# Patient Record
Sex: MALE | Race: BLACK OR AFRICAN AMERICAN | NOT HISPANIC OR LATINO | Employment: OTHER | URBAN - METROPOLITAN AREA
[De-identification: names, ages, dates, MRNs, and addresses within clinical notes are randomized per-mention and may not be internally consistent; named-entity substitution may affect disease eponyms.]

---

## 2018-03-21 ENCOUNTER — TELEPHONE (OUTPATIENT)
Dept: PAIN MEDICINE | Facility: CLINIC | Age: 58
End: 2018-03-21

## 2018-03-21 NOTE — TELEPHONE ENCOUNTER
----- Message from Twyla Deutsch sent at 3/20/2018  9:06 AM CDT -----  Contact: Cassia (wife)/ [phone]  Patients wife would like patient to be seen today for an MRI follow up that they brought to your office about a year ago.     Please call and advise.

## 2018-03-21 NOTE — TELEPHONE ENCOUNTER
Spoke with patient's wife regarding scheduling appt. Patient's wife stated that the patient need an appt due to his back pain. Patient's wife verbalized and confirmed appt date and time of arrival on 3/27/18 at 3 PM.

## 2018-03-21 NOTE — TELEPHONE ENCOUNTER
----- Message from Lorie Hyman sent at 3/21/2018 10:13 AM CDT -----  Contact: Wife.(Cassia) [phone]  Patient is returning your call. Patient's wife would like to speak with you about scheduling the patient's appointment for 3/23/18 if possible 2nd option would be 3/27/18. Please advise.

## 2018-03-21 NOTE — TELEPHONE ENCOUNTER
Spoke with patient's wife regarding scheduling an appt. Patient's wife stated that she will need to call back to schedule.

## 2020-06-16 PROBLEM — Z79.4 LONG TERM CURRENT USE OF INSULIN: Status: ACTIVE | Noted: 2018-02-20

## 2020-06-16 PROBLEM — M47.816 ARTHROPATHY OF LUMBAR FACET JOINT: Status: ACTIVE | Noted: 2018-08-21

## 2020-06-16 PROBLEM — G47.00 INSOMNIA: Status: ACTIVE | Noted: 2017-05-11

## 2020-06-16 PROBLEM — E66.01 MORBID OBESITY: Status: ACTIVE | Noted: 2019-02-26

## 2020-06-16 PROBLEM — Z72.0 TOBACCO USE: Status: ACTIVE | Noted: 2017-08-11

## 2020-06-16 PROBLEM — Z89.432 HISTORY OF AMPUTATION OF LEFT FOOT: Status: ACTIVE | Noted: 2020-06-16

## 2020-06-16 PROBLEM — R25.2 LEG CRAMPS: Status: ACTIVE | Noted: 2018-05-21

## 2020-06-16 PROBLEM — E11.319 DIABETIC RETINOPATHY ASSOCIATED WITH TYPE 2 DIABETES MELLITUS: Status: ACTIVE | Noted: 2018-08-26

## 2020-06-16 PROBLEM — E78.00 HIGH CHOLESTEROL: Status: ACTIVE | Noted: 2017-02-01

## 2020-06-16 PROBLEM — E11.51 TYPE 2 DIABETES MELLITUS WITH PERIPHERAL ANGIOPATHY: Status: ACTIVE | Noted: 2018-05-21

## 2020-06-16 PROBLEM — Z89.412 HISTORY OF AMPUTATION OF LEFT GREAT TOE: Status: ACTIVE | Noted: 2020-06-16

## 2020-06-16 PROBLEM — E11.65 HYPERGLYCEMIA DUE TO TYPE 2 DIABETES MELLITUS: Status: ACTIVE | Noted: 2018-02-20

## 2020-06-16 PROBLEM — U07.1 COVID-19: Status: ACTIVE | Noted: 2020-06-16

## 2020-09-23 PROBLEM — I48.92 ATRIAL FIBRILLATION AND FLUTTER: Status: ACTIVE | Noted: 2020-09-23

## 2020-09-23 PROBLEM — M46.96 INFLAMMATORY SPONDYLOPATHY OF LUMBAR REGION: Status: ACTIVE | Noted: 2020-09-23

## 2020-09-23 PROBLEM — E11.51 TYPE 2 DIABETES MELLITUS WITH PERIPHERAL ANGIOPATHY: Chronic | Status: ACTIVE | Noted: 2018-05-21

## 2020-09-23 PROBLEM — E11.40 DIABETIC NEUROPATHY: Status: ACTIVE | Noted: 2020-09-23

## 2020-09-23 PROBLEM — E66.01 MORBID OBESITY: Status: RESOLVED | Noted: 2019-02-26 | Resolved: 2020-09-23

## 2020-09-23 PROBLEM — F32.A DEPRESSIVE DISORDER: Status: ACTIVE | Noted: 2020-09-23

## 2020-09-23 PROBLEM — I73.9 PERIPHERAL VASCULAR DISEASE: Status: ACTIVE | Noted: 2020-09-23

## 2020-09-23 PROBLEM — F11.20 UNCOMPLICATED OPIOID DEPENDENCE: Status: ACTIVE | Noted: 2020-09-23

## 2020-09-23 PROBLEM — I48.91 ATRIAL FIBRILLATION AND FLUTTER: Status: ACTIVE | Noted: 2020-09-23

## 2020-09-23 PROBLEM — Z79.4 LONG TERM CURRENT USE OF INSULIN: Chronic | Status: ACTIVE | Noted: 2018-02-20

## 2020-09-23 PROBLEM — K21.9 GASTROESOPHAGEAL REFLUX DISEASE: Status: ACTIVE | Noted: 2020-09-23

## 2020-12-17 ENCOUNTER — LAB VISIT (OUTPATIENT)
Dept: LAB | Facility: HOSPITAL | Age: 60
End: 2020-12-17
Attending: FAMILY MEDICINE
Payer: MEDICARE

## 2020-12-17 DIAGNOSIS — Z79.4 TYPE 2 DIABETES MELLITUS WITH HYPOGLYCEMIA WITHOUT COMA, WITH LONG-TERM CURRENT USE OF INSULIN: ICD-10-CM

## 2020-12-17 DIAGNOSIS — E11.649 TYPE 2 DIABETES MELLITUS WITH HYPOGLYCEMIA WITHOUT COMA, WITH LONG-TERM CURRENT USE OF INSULIN: ICD-10-CM

## 2020-12-17 LAB
ANION GAP SERPL CALC-SCNC: 9 MMOL/L (ref 8–16)
BUN SERPL-MCNC: 20 MG/DL (ref 6–20)
CALCIUM SERPL-MCNC: 9.5 MG/DL (ref 8.7–10.5)
CHLORIDE SERPL-SCNC: 106 MMOL/L (ref 95–110)
CO2 SERPL-SCNC: 24 MMOL/L (ref 23–29)
CREAT SERPL-MCNC: 1.4 MG/DL (ref 0.5–1.4)
EST. GFR  (AFRICAN AMERICAN): >60 ML/MIN/1.73 M^2
EST. GFR  (NON AFRICAN AMERICAN): 54 ML/MIN/1.73 M^2
ESTIMATED AVG GLUCOSE: 177 MG/DL (ref 68–131)
GLUCOSE SERPL-MCNC: 168 MG/DL (ref 70–110)
HBA1C MFR BLD HPLC: 7.8 % (ref 4–5.6)
POTASSIUM SERPL-SCNC: 4.9 MMOL/L (ref 3.5–5.1)
SODIUM SERPL-SCNC: 139 MMOL/L (ref 136–145)

## 2020-12-17 PROCEDURE — 83036 HEMOGLOBIN GLYCOSYLATED A1C: CPT

## 2020-12-17 PROCEDURE — 80048 BASIC METABOLIC PNL TOTAL CA: CPT

## 2020-12-17 PROCEDURE — 36415 COLL VENOUS BLD VENIPUNCTURE: CPT

## 2020-12-23 PROBLEM — F32.A DEPRESSIVE DISORDER: Status: RESOLVED | Noted: 2020-09-23 | Resolved: 2020-12-23

## 2021-02-26 ENCOUNTER — IMMUNIZATION (OUTPATIENT)
Dept: INTERNAL MEDICINE | Facility: CLINIC | Age: 61
End: 2021-02-26
Payer: MEDICARE

## 2021-02-26 DIAGNOSIS — Z23 NEED FOR VACCINATION: Primary | ICD-10-CM

## 2021-02-26 PROCEDURE — 91300 COVID-19, MRNA, LNP-S, PF, 30 MCG/0.3 ML DOSE VACCINE: CPT | Mod: PBBFAC | Performed by: INTERNAL MEDICINE

## 2021-03-19 ENCOUNTER — IMMUNIZATION (OUTPATIENT)
Dept: INTERNAL MEDICINE | Facility: CLINIC | Age: 61
End: 2021-03-19
Payer: MEDICARE

## 2021-03-19 DIAGNOSIS — Z23 NEED FOR VACCINATION: Primary | ICD-10-CM

## 2021-03-19 PROCEDURE — 91300 COVID-19, MRNA, LNP-S, PF, 30 MCG/0.3 ML DOSE VACCINE: CPT | Mod: PBBFAC | Performed by: INTERNAL MEDICINE

## 2021-03-19 PROCEDURE — 0002A COVID-19, MRNA, LNP-S, PF, 30 MCG/0.3 ML DOSE VACCINE: CPT | Mod: PBBFAC | Performed by: INTERNAL MEDICINE

## 2021-03-22 ENCOUNTER — LAB VISIT (OUTPATIENT)
Dept: LAB | Facility: HOSPITAL | Age: 61
End: 2021-03-22
Attending: FAMILY MEDICINE
Payer: MEDICARE

## 2021-03-22 DIAGNOSIS — E11.51 TYPE 2 DIABETES MELLITUS WITH PERIPHERAL ANGIOPATHY: Chronic | ICD-10-CM

## 2021-03-22 LAB
ALBUMIN SERPL BCP-MCNC: 3.3 G/DL (ref 3.5–5.2)
ALP SERPL-CCNC: 58 U/L (ref 55–135)
ALT SERPL W/O P-5'-P-CCNC: 11 U/L (ref 10–44)
ANION GAP SERPL CALC-SCNC: 9 MMOL/L (ref 8–16)
AST SERPL-CCNC: 16 U/L (ref 10–40)
BILIRUB SERPL-MCNC: 0.4 MG/DL (ref 0.1–1)
BUN SERPL-MCNC: 24 MG/DL (ref 6–20)
CALCIUM SERPL-MCNC: 9.3 MG/DL (ref 8.7–10.5)
CHLORIDE SERPL-SCNC: 109 MMOL/L (ref 95–110)
CHOLEST SERPL-MCNC: 164 MG/DL (ref 120–199)
CHOLEST/HDLC SERPL: 3.7 {RATIO} (ref 2–5)
CO2 SERPL-SCNC: 22 MMOL/L (ref 23–29)
CREAT SERPL-MCNC: 1.5 MG/DL (ref 0.5–1.4)
EST. GFR  (AFRICAN AMERICAN): 58 ML/MIN/1.73 M^2
EST. GFR  (NON AFRICAN AMERICAN): 50 ML/MIN/1.73 M^2
ESTIMATED AVG GLUCOSE: 177 MG/DL (ref 68–131)
GLUCOSE SERPL-MCNC: 138 MG/DL (ref 70–110)
HBA1C MFR BLD: 7.8 % (ref 4–5.6)
HDLC SERPL-MCNC: 44 MG/DL (ref 40–75)
HDLC SERPL: 26.8 % (ref 20–50)
LDLC SERPL CALC-MCNC: 107 MG/DL (ref 63–159)
NONHDLC SERPL-MCNC: 120 MG/DL
POTASSIUM SERPL-SCNC: 5.1 MMOL/L (ref 3.5–5.1)
PROT SERPL-MCNC: 7.7 G/DL (ref 6–8.4)
SODIUM SERPL-SCNC: 140 MMOL/L (ref 136–145)
TRIGL SERPL-MCNC: 65 MG/DL (ref 30–150)

## 2021-03-22 PROCEDURE — 83036 HEMOGLOBIN GLYCOSYLATED A1C: CPT | Performed by: FAMILY MEDICINE

## 2021-03-22 PROCEDURE — 80053 COMPREHEN METABOLIC PANEL: CPT | Performed by: FAMILY MEDICINE

## 2021-03-22 PROCEDURE — 80061 LIPID PANEL: CPT | Performed by: FAMILY MEDICINE

## 2021-03-22 PROCEDURE — 36415 COLL VENOUS BLD VENIPUNCTURE: CPT | Performed by: FAMILY MEDICINE

## 2021-06-25 ENCOUNTER — HOSPITAL ENCOUNTER (EMERGENCY)
Facility: HOSPITAL | Age: 61
Discharge: HOME | End: 2021-06-25
Attending: EMERGENCY MEDICINE
Payer: MEDICARE

## 2021-06-25 VITALS
SYSTOLIC BLOOD PRESSURE: 140 MMHG | TEMPERATURE: 98 F | HEIGHT: 75 IN | RESPIRATION RATE: 19 BRPM | OXYGEN SATURATION: 100 % | HEART RATE: 76 BPM | DIASTOLIC BLOOD PRESSURE: 65 MMHG | BODY MASS INDEX: 39.17 KG/M2 | WEIGHT: 315 LBS

## 2021-06-25 DIAGNOSIS — R42 DIZZINESS: ICD-10-CM

## 2021-06-25 LAB
ALBUMIN SERPL BCP-MCNC: 3.3 G/DL (ref 3.5–5.2)
ALP SERPL-CCNC: 50 U/L (ref 55–135)
ALT SERPL W/O P-5'-P-CCNC: 9 U/L (ref 10–44)
ANION GAP SERPL CALC-SCNC: 9 MMOL/L (ref 8–16)
AST SERPL-CCNC: 14 U/L (ref 10–40)
BASOPHILS # BLD AUTO: 0.05 K/UL (ref 0–0.2)
BASOPHILS NFR BLD: 0.7 % (ref 0–1.9)
BILIRUB SERPL-MCNC: 0.3 MG/DL (ref 0.1–1)
BILIRUB UR QL STRIP: NEGATIVE
BNP SERPL-MCNC: 37 PG/ML (ref 0–99)
BUN SERPL-MCNC: 27 MG/DL (ref 6–20)
CALCIUM SERPL-MCNC: 8.9 MG/DL (ref 8.7–10.5)
CHLORIDE SERPL-SCNC: 107 MMOL/L (ref 95–110)
CK SERPL-CCNC: 246 U/L (ref 20–200)
CLARITY UR: CLEAR
CO2 SERPL-SCNC: 23 MMOL/L (ref 23–29)
COLOR UR: YELLOW
CREAT SERPL-MCNC: 1.6 MG/DL (ref 0.5–1.4)
DIFFERENTIAL METHOD: ABNORMAL
EOSINOPHIL # BLD AUTO: 0.2 K/UL (ref 0–0.5)
EOSINOPHIL NFR BLD: 3.2 % (ref 0–8)
ERYTHROCYTE [DISTWIDTH] IN BLOOD BY AUTOMATED COUNT: 14.9 % (ref 11.5–14.5)
EST. GFR  (AFRICAN AMERICAN): 53 ML/MIN/1.73 M^2
EST. GFR  (NON AFRICAN AMERICAN): 46 ML/MIN/1.73 M^2
GLUCOSE SERPL-MCNC: 131 MG/DL (ref 70–110)
GLUCOSE UR QL STRIP: NEGATIVE
HCT VFR BLD AUTO: 39 % (ref 40–54)
HGB BLD-MCNC: 12.5 G/DL (ref 14–18)
HGB UR QL STRIP: NEGATIVE
IMM GRANULOCYTES # BLD AUTO: 0.04 K/UL (ref 0–0.04)
IMM GRANULOCYTES NFR BLD AUTO: 0.6 % (ref 0–0.5)
KETONES UR QL STRIP: NEGATIVE
LEUKOCYTE ESTERASE UR QL STRIP: NEGATIVE
LYMPHOCYTES # BLD AUTO: 2.5 K/UL (ref 1–4.8)
LYMPHOCYTES NFR BLD: 34.5 % (ref 18–48)
MCH RBC QN AUTO: 28.5 PG (ref 27–31)
MCHC RBC AUTO-ENTMCNC: 32.1 G/DL (ref 32–36)
MCV RBC AUTO: 89 FL (ref 82–98)
MONOCYTES # BLD AUTO: 0.7 K/UL (ref 0.3–1)
MONOCYTES NFR BLD: 9.7 % (ref 4–15)
NEUTROPHILS # BLD AUTO: 3.6 K/UL (ref 1.8–7.7)
NEUTROPHILS NFR BLD: 51.3 % (ref 38–73)
NITRITE UR QL STRIP: NEGATIVE
NRBC BLD-RTO: 0 /100 WBC
PH UR STRIP: 5 [PH] (ref 5–8)
PLATELET # BLD AUTO: 173 K/UL (ref 150–450)
PMV BLD AUTO: 11.3 FL (ref 9.2–12.9)
POCT GLUCOSE: 123 MG/DL (ref 70–110)
POTASSIUM SERPL-SCNC: 4.4 MMOL/L (ref 3.5–5.1)
PROT SERPL-MCNC: 6.8 G/DL (ref 6–8.4)
PROT UR QL STRIP: NEGATIVE
RBC # BLD AUTO: 4.39 M/UL (ref 4.6–6.2)
SODIUM SERPL-SCNC: 139 MMOL/L (ref 136–145)
SP GR UR STRIP: 1.02 (ref 1–1.03)
TROPONIN I SERPL DL<=0.01 NG/ML-MCNC: 0.01 NG/ML (ref 0–0.03)
URN SPEC COLLECT METH UR: NORMAL
UROBILINOGEN UR STRIP-ACNC: NEGATIVE EU/DL
WBC # BLD AUTO: 7.1 K/UL (ref 3.9–12.7)

## 2021-06-25 PROCEDURE — 99285 EMERGENCY DEPT VISIT HI MDM: CPT | Mod: 25

## 2021-06-25 PROCEDURE — 82962 GLUCOSE BLOOD TEST: CPT

## 2021-06-25 PROCEDURE — 93005 ELECTROCARDIOGRAM TRACING: CPT

## 2021-06-25 PROCEDURE — 93010 ELECTROCARDIOGRAM REPORT: CPT | Mod: ,,, | Performed by: INTERNAL MEDICINE

## 2021-06-25 PROCEDURE — 85025 COMPLETE CBC W/AUTO DIFF WBC: CPT | Performed by: EMERGENCY MEDICINE

## 2021-06-25 PROCEDURE — 80053 COMPREHEN METABOLIC PANEL: CPT | Performed by: EMERGENCY MEDICINE

## 2021-06-25 PROCEDURE — 96365 THER/PROPH/DIAG IV INF INIT: CPT

## 2021-06-25 PROCEDURE — 84484 ASSAY OF TROPONIN QUANT: CPT | Performed by: EMERGENCY MEDICINE

## 2021-06-25 PROCEDURE — 83880 ASSAY OF NATRIURETIC PEPTIDE: CPT | Performed by: EMERGENCY MEDICINE

## 2021-06-25 PROCEDURE — 93010 EKG 12-LEAD: ICD-10-PCS | Mod: ,,, | Performed by: INTERNAL MEDICINE

## 2021-06-25 PROCEDURE — 25000003 PHARM REV CODE 250: Performed by: EMERGENCY MEDICINE

## 2021-06-25 PROCEDURE — 63600175 PHARM REV CODE 636 W HCPCS: Performed by: EMERGENCY MEDICINE

## 2021-06-25 PROCEDURE — 82550 ASSAY OF CK (CPK): CPT | Performed by: EMERGENCY MEDICINE

## 2021-06-25 PROCEDURE — 81003 URINALYSIS AUTO W/O SCOPE: CPT | Performed by: EMERGENCY MEDICINE

## 2021-06-25 RX ORDER — MECLIZINE HYDROCHLORIDE 25 MG/1
25 TABLET ORAL 3 TIMES DAILY PRN
Qty: 20 TABLET | Refills: 0 | Status: SHIPPED | OUTPATIENT
Start: 2021-06-25 | End: 2021-08-25

## 2021-06-25 RX ADMIN — PROMETHAZINE HYDROCHLORIDE 12.5 MG: 25 INJECTION INTRAMUSCULAR; INTRAVENOUS at 12:06

## 2021-07-07 PROBLEM — Z89.432 HISTORY OF AMPUTATION OF LEFT FOOT: Chronic | Status: ACTIVE | Noted: 2020-06-16

## 2021-07-07 PROBLEM — E11.65 HYPERGLYCEMIA DUE TO TYPE 2 DIABETES MELLITUS: Status: RESOLVED | Noted: 2018-02-20 | Resolved: 2021-07-07

## 2021-07-07 PROBLEM — E11.3513 PROLIFERATIVE DIABETIC RETINOPATHY OF BOTH EYES WITH MACULAR EDEMA ASSOCIATED WITH TYPE 2 DIABETES MELLITUS: Status: ACTIVE | Noted: 2021-07-07

## 2021-08-25 PROBLEM — U07.1 COVID-19: Status: RESOLVED | Noted: 2020-06-16 | Resolved: 2021-08-25

## 2021-08-26 ENCOUNTER — TELEPHONE (OUTPATIENT)
Dept: CARDIOLOGY | Facility: CLINIC | Age: 61
End: 2021-08-26

## 2022-02-01 ENCOUNTER — LAB VISIT (OUTPATIENT)
Dept: LAB | Facility: HOSPITAL | Age: 62
End: 2022-02-01
Attending: FAMILY MEDICINE
Payer: MEDICARE

## 2022-02-01 DIAGNOSIS — I10 ESSENTIAL HYPERTENSION: ICD-10-CM

## 2022-02-01 DIAGNOSIS — N18.31 STAGE 3A CHRONIC KIDNEY DISEASE: ICD-10-CM

## 2022-02-01 DIAGNOSIS — E11.51 TYPE 2 DIABETES MELLITUS WITH PERIPHERAL ANGIOPATHY: ICD-10-CM

## 2022-02-01 PROBLEM — N18.30 STAGE 3 CHRONIC KIDNEY DISEASE: Status: ACTIVE | Noted: 2022-02-01

## 2022-02-01 PROBLEM — E66.01 MORBID OBESITY: Chronic | Status: ACTIVE | Noted: 2019-02-26

## 2022-02-01 PROBLEM — N18.30 STAGE 3 CHRONIC KIDNEY DISEASE: Chronic | Status: ACTIVE | Noted: 2022-02-01

## 2022-02-01 LAB
ALBUMIN SERPL BCP-MCNC: 3.3 G/DL (ref 3.5–5.2)
ALP SERPL-CCNC: 53 U/L (ref 55–135)
ALT SERPL W/O P-5'-P-CCNC: 12 U/L (ref 10–44)
ANION GAP SERPL CALC-SCNC: 10 MMOL/L (ref 8–16)
ANION GAP SERPL CALC-SCNC: 10 MMOL/L (ref 8–16)
AST SERPL-CCNC: 17 U/L (ref 10–40)
BASOPHILS # BLD AUTO: 0.07 K/UL (ref 0–0.2)
BASOPHILS NFR BLD: 1 % (ref 0–1.9)
BILIRUB SERPL-MCNC: 0.3 MG/DL (ref 0.1–1)
BUN SERPL-MCNC: 21 MG/DL (ref 8–23)
BUN SERPL-MCNC: 21 MG/DL (ref 8–23)
CALCIUM SERPL-MCNC: 9.3 MG/DL (ref 8.7–10.5)
CALCIUM SERPL-MCNC: 9.3 MG/DL (ref 8.7–10.5)
CHLORIDE SERPL-SCNC: 110 MMOL/L (ref 95–110)
CHLORIDE SERPL-SCNC: 110 MMOL/L (ref 95–110)
CHOLEST SERPL-MCNC: 156 MG/DL (ref 120–199)
CHOLEST/HDLC SERPL: 3.5 {RATIO} (ref 2–5)
CO2 SERPL-SCNC: 22 MMOL/L (ref 23–29)
CO2 SERPL-SCNC: 22 MMOL/L (ref 23–29)
CREAT SERPL-MCNC: 1.5 MG/DL (ref 0.5–1.4)
CREAT SERPL-MCNC: 1.5 MG/DL (ref 0.5–1.4)
DIFFERENTIAL METHOD: ABNORMAL
EOSINOPHIL # BLD AUTO: 0.2 K/UL (ref 0–0.5)
EOSINOPHIL NFR BLD: 3 % (ref 0–8)
ERYTHROCYTE [DISTWIDTH] IN BLOOD BY AUTOMATED COUNT: 14.8 % (ref 11.5–14.5)
EST. GFR  (AFRICAN AMERICAN): 57 ML/MIN/1.73 M^2
EST. GFR  (AFRICAN AMERICAN): 57 ML/MIN/1.73 M^2
EST. GFR  (NON AFRICAN AMERICAN): 50 ML/MIN/1.73 M^2
EST. GFR  (NON AFRICAN AMERICAN): 50 ML/MIN/1.73 M^2
ESTIMATED AVG GLUCOSE: 180 MG/DL (ref 68–131)
GLUCOSE SERPL-MCNC: 85 MG/DL (ref 70–110)
GLUCOSE SERPL-MCNC: 85 MG/DL (ref 70–110)
HBA1C MFR BLD: 7.9 % (ref 4–5.6)
HCT VFR BLD AUTO: 38.3 % (ref 40–54)
HDLC SERPL-MCNC: 44 MG/DL (ref 40–75)
HDLC SERPL: 28.2 % (ref 20–50)
HGB BLD-MCNC: 12.2 G/DL (ref 14–18)
IMM GRANULOCYTES # BLD AUTO: 0.05 K/UL (ref 0–0.04)
IMM GRANULOCYTES NFR BLD AUTO: 0.7 % (ref 0–0.5)
LDLC SERPL CALC-MCNC: 95.2 MG/DL (ref 63–159)
LYMPHOCYTES # BLD AUTO: 3.2 K/UL (ref 1–4.8)
LYMPHOCYTES NFR BLD: 43.9 % (ref 18–48)
MCH RBC QN AUTO: 28.5 PG (ref 27–31)
MCHC RBC AUTO-ENTMCNC: 31.9 G/DL (ref 32–36)
MCV RBC AUTO: 90 FL (ref 82–98)
MONOCYTES # BLD AUTO: 0.8 K/UL (ref 0.3–1)
MONOCYTES NFR BLD: 10.5 % (ref 4–15)
NEUTROPHILS # BLD AUTO: 3 K/UL (ref 1.8–7.7)
NEUTROPHILS NFR BLD: 40.9 % (ref 38–73)
NONHDLC SERPL-MCNC: 112 MG/DL
NRBC BLD-RTO: 0 /100 WBC
PLATELET # BLD AUTO: 207 K/UL (ref 150–450)
PMV BLD AUTO: 10.7 FL (ref 9.2–12.9)
POTASSIUM SERPL-SCNC: 4.8 MMOL/L (ref 3.5–5.1)
POTASSIUM SERPL-SCNC: 4.8 MMOL/L (ref 3.5–5.1)
PROT SERPL-MCNC: 7.7 G/DL (ref 6–8.4)
PTH-INTACT SERPL-MCNC: 61.8 PG/ML (ref 9–77)
RBC # BLD AUTO: 4.28 M/UL (ref 4.6–6.2)
SODIUM SERPL-SCNC: 142 MMOL/L (ref 136–145)
SODIUM SERPL-SCNC: 142 MMOL/L (ref 136–145)
TRIGL SERPL-MCNC: 84 MG/DL (ref 30–150)
TSH SERPL DL<=0.005 MIU/L-ACNC: 1.88 UIU/ML (ref 0.4–4)
WBC # BLD AUTO: 7.32 K/UL (ref 3.9–12.7)

## 2022-02-01 PROCEDURE — 36415 COLL VENOUS BLD VENIPUNCTURE: CPT | Performed by: FAMILY MEDICINE

## 2022-02-01 PROCEDURE — 83036 HEMOGLOBIN GLYCOSYLATED A1C: CPT | Performed by: FAMILY MEDICINE

## 2022-02-01 PROCEDURE — 85025 COMPLETE CBC W/AUTO DIFF WBC: CPT | Performed by: FAMILY MEDICINE

## 2022-02-01 PROCEDURE — 84443 ASSAY THYROID STIM HORMONE: CPT | Performed by: FAMILY MEDICINE

## 2022-02-01 PROCEDURE — 83970 ASSAY OF PARATHORMONE: CPT | Performed by: FAMILY MEDICINE

## 2022-02-01 PROCEDURE — 80061 LIPID PANEL: CPT | Performed by: FAMILY MEDICINE

## 2022-02-01 PROCEDURE — 80053 COMPREHEN METABOLIC PANEL: CPT | Performed by: FAMILY MEDICINE

## 2022-03-07 PROBLEM — Z79.4 LONG TERM CURRENT USE OF INSULIN: Chronic | Status: RESOLVED | Noted: 2018-02-20 | Resolved: 2022-03-07

## 2022-04-26 PROBLEM — S91.302A WOUND OF LEFT FOOT: Status: ACTIVE | Noted: 2022-04-26

## 2022-05-09 ENCOUNTER — LAB VISIT (OUTPATIENT)
Dept: LAB | Facility: HOSPITAL | Age: 62
End: 2022-05-09
Attending: FAMILY MEDICINE
Payer: MEDICARE

## 2022-05-09 DIAGNOSIS — E11.51 TYPE 2 DIABETES MELLITUS WITH PERIPHERAL ANGIOPATHY: Chronic | ICD-10-CM

## 2022-05-09 LAB
ANION GAP SERPL CALC-SCNC: 13 MMOL/L (ref 8–16)
BUN SERPL-MCNC: 35 MG/DL (ref 8–23)
CALCIUM SERPL-MCNC: 9.7 MG/DL (ref 8.7–10.5)
CHLORIDE SERPL-SCNC: 107 MMOL/L (ref 95–110)
CO2 SERPL-SCNC: 21 MMOL/L (ref 23–29)
CREAT SERPL-MCNC: 1.9 MG/DL (ref 0.5–1.4)
EST. GFR  (AFRICAN AMERICAN): 43 ML/MIN/1.73 M^2
EST. GFR  (NON AFRICAN AMERICAN): 37 ML/MIN/1.73 M^2
ESTIMATED AVG GLUCOSE: 232 MG/DL (ref 68–131)
GLUCOSE SERPL-MCNC: 260 MG/DL (ref 70–110)
HBA1C MFR BLD: 9.7 % (ref 4–5.6)
POTASSIUM SERPL-SCNC: 4.4 MMOL/L (ref 3.5–5.1)
SODIUM SERPL-SCNC: 141 MMOL/L (ref 136–145)

## 2022-05-09 PROCEDURE — 36415 COLL VENOUS BLD VENIPUNCTURE: CPT | Performed by: FAMILY MEDICINE

## 2022-05-09 PROCEDURE — 83036 HEMOGLOBIN GLYCOSYLATED A1C: CPT | Performed by: FAMILY MEDICINE

## 2022-05-09 PROCEDURE — 80048 BASIC METABOLIC PNL TOTAL CA: CPT | Performed by: FAMILY MEDICINE

## 2022-05-19 ENCOUNTER — HOSPITAL ENCOUNTER (OUTPATIENT)
Dept: RADIOLOGY | Facility: HOSPITAL | Age: 62
Discharge: HOME | End: 2022-05-19
Attending: SURGERY
Payer: MEDICARE

## 2022-05-19 DIAGNOSIS — E11.40 TYPE 2 DIABETES MELLITUS WITH DIABETIC NEUROPATHY, WITH LONG-TERM CURRENT USE OF INSULIN: Chronic | ICD-10-CM

## 2022-05-19 DIAGNOSIS — I10 ESSENTIAL HYPERTENSION: ICD-10-CM

## 2022-05-19 DIAGNOSIS — Z79.4 TYPE 2 DIABETES MELLITUS WITH DIABETIC NEUROPATHY, WITH LONG-TERM CURRENT USE OF INSULIN: Chronic | ICD-10-CM

## 2022-05-19 DIAGNOSIS — F11.20 UNCOMPLICATED OPIOID DEPENDENCE: ICD-10-CM

## 2022-05-19 DIAGNOSIS — T81.49XA WOUND, SURGICAL, INFECTED: ICD-10-CM

## 2022-05-19 DIAGNOSIS — Z79.4 TYPE 2 DIABETES MELLITUS WITH DIABETIC NEUROPATHY, WITH LONG-TERM CURRENT USE OF INSULIN: ICD-10-CM

## 2022-05-19 DIAGNOSIS — E11.40 TYPE 2 DIABETES MELLITUS WITH DIABETIC NEUROPATHY, WITH LONG-TERM CURRENT USE OF INSULIN: ICD-10-CM

## 2022-05-19 DIAGNOSIS — E66.01 MORBID OBESITY: ICD-10-CM

## 2022-05-19 PROCEDURE — 73630 XR FOOT COMPLETE 3 VIEW LEFT: ICD-10-PCS | Mod: 26,LT,, | Performed by: RADIOLOGY

## 2022-05-19 PROCEDURE — 73630 X-RAY EXAM OF FOOT: CPT | Mod: 26,LT,, | Performed by: RADIOLOGY

## 2022-05-19 PROCEDURE — 73630 X-RAY EXAM OF FOOT: CPT | Mod: TC,FY,LT

## 2022-05-23 ENCOUNTER — ANESTHESIA EVENT (OUTPATIENT)
Dept: SURGERY | Facility: HOSPITAL | Age: 62
End: 2022-05-23
Payer: MEDICARE

## 2022-05-23 ENCOUNTER — ANESTHESIA (OUTPATIENT)
Dept: SURGERY | Facility: HOSPITAL | Age: 62
End: 2022-05-23
Payer: MEDICARE

## 2022-05-23 ENCOUNTER — HOSPITAL ENCOUNTER (OUTPATIENT)
Facility: HOSPITAL | Age: 62
Discharge: HOME | End: 2022-05-23
Attending: SURGERY | Admitting: SURGERY
Payer: MEDICARE

## 2022-05-23 VITALS
HEIGHT: 76 IN | OXYGEN SATURATION: 95 % | DIASTOLIC BLOOD PRESSURE: 60 MMHG | TEMPERATURE: 97 F | RESPIRATION RATE: 16 BRPM | BODY MASS INDEX: 34.95 KG/M2 | SYSTOLIC BLOOD PRESSURE: 103 MMHG | WEIGHT: 287 LBS | HEART RATE: 70 BPM

## 2022-05-23 DIAGNOSIS — S91.302A WOUND OF LEFT FOOT: Primary | ICD-10-CM

## 2022-05-23 DIAGNOSIS — Z79.4 TYPE 2 DIABETES MELLITUS WITH DIABETIC NEUROPATHY, WITH LONG-TERM CURRENT USE OF INSULIN: Chronic | ICD-10-CM

## 2022-05-23 DIAGNOSIS — F11.20 UNCOMPLICATED OPIOID DEPENDENCE: ICD-10-CM

## 2022-05-23 DIAGNOSIS — N18.31 STAGE 3A CHRONIC KIDNEY DISEASE: Chronic | ICD-10-CM

## 2022-05-23 DIAGNOSIS — I10 ESSENTIAL HYPERTENSION: Chronic | ICD-10-CM

## 2022-05-23 DIAGNOSIS — E66.01 MORBID OBESITY: Chronic | ICD-10-CM

## 2022-05-23 DIAGNOSIS — E11.40 TYPE 2 DIABETES MELLITUS WITH DIABETIC NEUROPATHY, WITH LONG-TERM CURRENT USE OF INSULIN: Chronic | ICD-10-CM

## 2022-05-23 DIAGNOSIS — I73.9 PERIPHERAL VASCULAR DISEASE: ICD-10-CM

## 2022-05-23 DIAGNOSIS — E11.51 TYPE 2 DIABETES MELLITUS WITH PERIPHERAL ANGIOPATHY: Chronic | ICD-10-CM

## 2022-05-23 DIAGNOSIS — E11.44 DIABETIC AMYOTROPHY ASSOCIATED WITH TYPE 2 DIABETES MELLITUS: ICD-10-CM

## 2022-05-23 LAB — POCT GLUCOSE: 229 MG/DL (ref 70–110)

## 2022-05-23 PROCEDURE — 71000016 HC POSTOP RECOV ADDL HR: Performed by: SURGERY

## 2022-05-23 PROCEDURE — 87205 SMEAR GRAM STAIN: CPT | Performed by: SURGERY

## 2022-05-23 PROCEDURE — 87077 CULTURE AEROBIC IDENTIFY: CPT | Performed by: SURGERY

## 2022-05-23 PROCEDURE — 25000003 PHARM REV CODE 250: Performed by: SURGERY

## 2022-05-23 PROCEDURE — 25000003 PHARM REV CODE 250: Performed by: NURSE ANESTHETIST, CERTIFIED REGISTERED

## 2022-05-23 PROCEDURE — 87186 SC STD MICRODIL/AGAR DIL: CPT | Mod: 59 | Performed by: SURGERY

## 2022-05-23 PROCEDURE — 63600175 PHARM REV CODE 636 W HCPCS: Performed by: NURSE ANESTHETIST, CERTIFIED REGISTERED

## 2022-05-23 PROCEDURE — 36000707: Performed by: SURGERY

## 2022-05-23 PROCEDURE — 87076 CULTURE ANAEROBE IDENT EACH: CPT | Mod: 59 | Performed by: SURGERY

## 2022-05-23 PROCEDURE — 37000008 HC ANESTHESIA 1ST 15 MINUTES: Performed by: SURGERY

## 2022-05-23 PROCEDURE — 71000015 HC POSTOP RECOV 1ST HR: Performed by: SURGERY

## 2022-05-23 PROCEDURE — 87075 CULTR BACTERIA EXCEPT BLOOD: CPT | Performed by: SURGERY

## 2022-05-23 PROCEDURE — 37000009 HC ANESTHESIA EA ADD 15 MINS: Performed by: SURGERY

## 2022-05-23 PROCEDURE — 87070 CULTURE OTHR SPECIMN AEROBIC: CPT | Performed by: SURGERY

## 2022-05-23 PROCEDURE — 36000706: Performed by: SURGERY

## 2022-05-23 RX ORDER — MUPIROCIN 20 MG/G
OINTMENT TOPICAL
Status: DISCONTINUED | OUTPATIENT
Start: 2022-05-23 | End: 2022-05-23 | Stop reason: HOSPADM

## 2022-05-23 RX ORDER — ACETAMINOPHEN 325 MG/1
650 TABLET ORAL EVERY 4 HOURS PRN
Status: DISCONTINUED | OUTPATIENT
Start: 2022-05-23 | End: 2022-05-23 | Stop reason: HOSPADM

## 2022-05-23 RX ORDER — PROPOFOL 10 MG/ML
VIAL (ML) INTRAVENOUS CONTINUOUS PRN
Status: DISCONTINUED | OUTPATIENT
Start: 2022-05-23 | End: 2022-05-23

## 2022-05-23 RX ORDER — ONDANSETRON 2 MG/ML
INJECTION INTRAMUSCULAR; INTRAVENOUS
Status: DISCONTINUED | OUTPATIENT
Start: 2022-05-23 | End: 2022-05-23

## 2022-05-23 RX ORDER — DEXMEDETOMIDINE HYDROCHLORIDE 100 UG/ML
INJECTION, SOLUTION INTRAVENOUS
Status: DISCONTINUED | OUTPATIENT
Start: 2022-05-23 | End: 2022-05-23

## 2022-05-23 RX ORDER — PROPOFOL 10 MG/ML
VIAL (ML) INTRAVENOUS
Status: DISCONTINUED | OUTPATIENT
Start: 2022-05-23 | End: 2022-05-23

## 2022-05-23 RX ORDER — LIDOCAINE HYDROCHLORIDE 20 MG/ML
INJECTION, SOLUTION EPIDURAL; INFILTRATION; INTRACAUDAL; PERINEURAL
Status: DISCONTINUED | OUTPATIENT
Start: 2022-05-23 | End: 2022-05-23

## 2022-05-23 RX ORDER — CLINDAMYCIN PHOSPHATE 900 MG/50ML
INJECTION, SOLUTION INTRAVENOUS
Status: DISCONTINUED | OUTPATIENT
Start: 2022-05-23 | End: 2022-05-23

## 2022-05-23 RX ORDER — SODIUM CHLORIDE 9 MG/ML
INJECTION, SOLUTION INTRAVENOUS CONTINUOUS
Status: DISCONTINUED | OUTPATIENT
Start: 2022-05-23 | End: 2022-05-23 | Stop reason: HOSPADM

## 2022-05-23 RX ORDER — FENTANYL CITRATE 50 UG/ML
INJECTION, SOLUTION INTRAMUSCULAR; INTRAVENOUS
Status: DISCONTINUED | OUTPATIENT
Start: 2022-05-23 | End: 2022-05-23

## 2022-05-23 RX ORDER — HYDROCODONE BITARTRATE AND ACETAMINOPHEN 5; 325 MG/1; MG/1
1 TABLET ORAL EVERY 6 HOURS PRN
Qty: 20 TABLET | Refills: 0 | Status: ON HOLD | OUTPATIENT
Start: 2022-05-23 | End: 2023-07-17 | Stop reason: HOSPADM

## 2022-05-23 RX ORDER — HYDROCODONE BITARTRATE AND ACETAMINOPHEN 5; 325 MG/1; MG/1
1 TABLET ORAL EVERY 4 HOURS PRN
Status: DISCONTINUED | OUTPATIENT
Start: 2022-05-23 | End: 2022-05-23 | Stop reason: HOSPADM

## 2022-05-23 RX ORDER — MIDAZOLAM HYDROCHLORIDE 1 MG/ML
INJECTION, SOLUTION INTRAMUSCULAR; INTRAVENOUS
Status: DISCONTINUED | OUTPATIENT
Start: 2022-05-23 | End: 2022-05-23

## 2022-05-23 RX ORDER — PHENYLEPHRINE HYDROCHLORIDE 10 MG/ML
INJECTION INTRAVENOUS
Status: DISCONTINUED | OUTPATIENT
Start: 2022-05-23 | End: 2022-05-23

## 2022-05-23 RX ORDER — LIDOCAINE HYDROCHLORIDE 10 MG/ML
INJECTION, SOLUTION EPIDURAL; INFILTRATION; INTRACAUDAL; PERINEURAL
Status: DISCONTINUED | OUTPATIENT
Start: 2022-05-23 | End: 2022-05-23 | Stop reason: HOSPADM

## 2022-05-23 RX ADMIN — PROPOFOL 30 MG: 10 INJECTION, EMULSION INTRAVENOUS at 12:05

## 2022-05-23 RX ADMIN — PHENYLEPHRINE HYDROCHLORIDE 100 MCG: 10 INJECTION INTRAVENOUS at 01:05

## 2022-05-23 RX ADMIN — CLINDAMYCIN IN 5 PERCENT DEXTROSE 600 MG: 18 INJECTION, SOLUTION INTRAVENOUS at 01:05

## 2022-05-23 RX ADMIN — DEXMEDETOMIDINE HYDROCHLORIDE 8 MCG: 100 INJECTION, SOLUTION, CONCENTRATE INTRAVENOUS at 12:05

## 2022-05-23 RX ADMIN — PROPOFOL 50 MCG/KG/MIN: 10 INJECTION, EMULSION INTRAVENOUS at 12:05

## 2022-05-23 RX ADMIN — PHENYLEPHRINE HYDROCHLORIDE 100 MCG: 10 INJECTION INTRAVENOUS at 12:05

## 2022-05-23 RX ADMIN — FENTANYL CITRATE 25 MCG: 50 INJECTION, SOLUTION INTRAMUSCULAR; INTRAVENOUS at 01:05

## 2022-05-23 RX ADMIN — SODIUM CHLORIDE 70 ML/HR: 0.9 INJECTION, SOLUTION INTRAVENOUS at 10:05

## 2022-05-23 RX ADMIN — FENTANYL CITRATE 25 MCG: 50 INJECTION, SOLUTION INTRAMUSCULAR; INTRAVENOUS at 12:05

## 2022-05-23 RX ADMIN — MIDAZOLAM 2 MG: 1 INJECTION INTRAMUSCULAR; INTRAVENOUS at 12:05

## 2022-05-23 RX ADMIN — LIDOCAINE HYDROCHLORIDE 100 MG: 20 INJECTION, SOLUTION EPIDURAL; INFILTRATION; INTRACAUDAL; PERINEURAL at 12:05

## 2022-05-23 RX ADMIN — DEXMEDETOMIDINE HYDROCHLORIDE 4 MCG: 100 INJECTION, SOLUTION, CONCENTRATE INTRAVENOUS at 12:05

## 2022-05-23 RX ADMIN — SODIUM CHLORIDE: 0.9 INJECTION, SOLUTION INTRAVENOUS at 12:05

## 2022-05-23 RX ADMIN — ONDANSETRON 8 MG: 2 INJECTION, SOLUTION INTRAMUSCULAR; INTRAVENOUS at 01:05

## 2022-05-23 RX ADMIN — GLYCOPYRROLATE 0.2 MG: 0.2 INJECTION, SOLUTION INTRAMUSCULAR; INTRAVITREAL at 12:05

## 2022-05-23 RX ADMIN — PHENYLEPHRINE HYDROCHLORIDE 200 MCG: 10 INJECTION INTRAVENOUS at 01:05

## 2022-05-23 NOTE — OP NOTE
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]  Uncomplicated opioid dependence [F11.20]  Essential hypertension [I10]  Morbid obesity [E66.01]  Type 2 diabetes mellitus with diabetic neuropathy, with long-term current use of insulin [E11.40, Z79.4]    Post-Operative Diagnosis: Post-Op Diagnosis Codes:     * 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    Description of Technical Procedures:  After satisfactory IV sedation patient in supine position time-out was called site was confirmed patient identity confirmed left leg left foot was prepped and draped in a sterile manner using ChloraPrep stockinette was applied area of the infected wound at transmetatarsal stump was infiltrated using 1% xylocaine solution with help of knife and Metzenbaum all infected necrotic tissue was debrided up to freshly bleeding tissue wound was cauterized using electric cautery was thoroughly irrigated antibiotic solution hemostasis was maintained tissue was also sent for culture wound was then dressed using Xeroform 4 x 4 and Kerlix patient  tolerated well size of the wound was 12 x 6 cm patient sent to recovery room in stable condition.    Significant Surgical Tasks Conducted by the Assistant(s), if Applicable: na    Estimated Blood Loss (EBL): 30 cc         Implants: * No implants in log *    Specimens:   Specimen (24h ago, onward)            None                  Condition: Good    Disposition: PACU - hemodynamically stable.    Attestation: I performed the procedure.    Discharge Note    OUTCOME: Patient tolerated treatment/procedure well without complication and is now ready for discharge.    DISPOSITION: Home or Self Care    FINAL DIAGNOSIS:  Wound of left foot    FOLLOWUP: In clinic Thursday.    DISCHARGE INSTRUCTIONS:    Discharge Procedure Orders   Diet general     Keep surgical extremity elevated     Lifting restrictions     Leave dressing on - Keep it clean, dry, and intact until clinic visit     Call MD for:  temperature >100.4     Call MD for:  persistent nausea and vomiting     Call MD for:  severe uncontrolled pain     Call MD for:  redness, tenderness, or signs of infection (pain, swelling, redness, odor or green/yellow discharge around incision site)

## 2022-05-23 NOTE — ANESTHESIA POSTPROCEDURE EVALUATION
Anesthesia Post Evaluation    Patient: Al Anna    Procedure(s) Performed: Procedure(s) (LRB):  DEBRIDEMENT, WOUND (Left)    Final Anesthesia Type: MAC      Patient location during evaluation: [facility]  Patient participation: Yes- Able to Participate  Level of consciousness: awake and alert  Post-procedure vital signs: reviewed and stable  Pain management: adequate  Airway patency: patent    PONV status at discharge: No PONV  Anesthetic complications: no      Cardiovascular status: blood pressure returned to baseline  Respiratory status: unassisted and spontaneous ventilation  Hydration status: euvolemic  Follow-up not needed.          Vitals Value Taken Time   /65 05/23/22 1316   Temp 98 05/23/22 1316   Pulse 78 05/23/22 1316   Resp 16 05/23/22 1316   SpO2 100 05/23/22 1316         No case tracking events are documented in the log.      Pain/Darby Score: No data recorded

## 2022-05-23 NOTE — ANESTHESIA PREPROCEDURE EVALUATION
05/23/2022  Al Anna is a 61 y.o., male.  Past Medical History:   Diagnosis Date    Coronary artery disease     MI (myocardial infarction)    COVID-19 6/16/2020    Diabetes mellitus     Neuropathy - Retinopathy - PAD    Glaucoma     High cholesterol     Hypertension      Pre-op Assessment    I have reviewed the Patient Summary Reports.    I have reviewed the NPO Status.   I have reviewed the Medications.     Review of Systems  Anesthesia Hx:  Denies Family Hx of Anesthesia complications.    Social:  Former Smoker, Alcohol Use    Cardiovascular:   Hypertension CAD      Renal/:   Chronic Renal Disease    Hepatic/GI:   GERD    Neurological:   Neuromuscular Disease,    Endocrine:   Diabetes        Physical Exam  General: Well nourished    Airway:  Mallampati: II   TM Distance: Normal  Neck ROM: Normal ROM    Chest/Lungs:  Clear to auscultation    Heart:  Rate: Normal  Rhythm: Regular Rhythm      Lab Results   Component Value Date    WBC 7.32 02/01/2022    HGB 12.2 (L) 02/01/2022    HCT 38.3 (L) 02/01/2022     02/01/2022    CHOL 156 02/01/2022    TRIG 84 02/01/2022    HDL 44 02/01/2022    ALT 12 02/01/2022    AST 17 02/01/2022     05/09/2022    K 4.4 05/09/2022     05/09/2022    CREATININE 1.9 (H) 05/09/2022    BUN 35 (H) 05/09/2022    CO2 21 (L) 05/09/2022    TSH 1.879 02/01/2022    INR 0.9 08/19/2013    HGBA1C 9.7 (H) 05/09/2022         Anesthesia Plan  Type of Anesthesia, risks & benefits discussed:    Anesthesia Type: MAC, Gen ETT  Intra-op Monitoring Plan: Standard ASA Monitors  Informed Consent: Informed consent signed with the Patient and all parties understand the risks and agree with anesthesia plan.  All questions answered.   ASA Score: 3    Ready For Surgery From Anesthesia Perspective.     .

## 2022-05-23 NOTE — H&P
"History & Physical    SUBJECTIVE:     History of Present Illness:  Patient is a 61 y.o. male presents with      No chief complaint on file.      Review of patient's allergies indicates:   Allergen Reactions    Penicillins Rash       Current Facility-Administered Medications   Medication Dose Route Frequency Provider Last Rate Last Admin    0.9%  NaCl infusion   Intravenous Continuous Dilip Horner MD 70 mL/hr at 05/23/22 1045 70 mL/hr at 05/23/22 1045    mupirocin 2 % ointment   Nasal On Call Procedure Dilip Horner MD           Past Medical History:   Diagnosis Date    Coronary artery disease     MI (myocardial infarction)    COVID-19 6/16/2020    Diabetes mellitus     Neuropathy - Retinopathy - PAD    Glaucoma     High cholesterol     Hypertension      History reviewed. No pertinent surgical history.  Family History   Problem Relation Age of Onset    Heart disease Father      Social History     Tobacco Use    Smoking status: Former Smoker     Types: Cigarettes     Start date: 10/28/1979    Smokeless tobacco: Never Used   Substance Use Topics    Alcohol use: Yes     Alcohol/week: 1.0 standard drink     Types: 1 Cans of beer per week    Drug use: No        Review of Systems:    Review of Systems   Constitutional: Negative.    HENT: Negative.    Eyes: Negative.    Respiratory: Negative.    Cardiovascular: Negative.    Gastrointestinal: Negative.    Genitourinary: Negative.    Musculoskeletal: Negative.    Neurological: Negative.    Psychiatric/Behavioral: Negative.        OBJECTIVE:     Vital Signs (Most Recent)  Temp: 97.9 °F (36.6 °C) (05/23/22 1102)  Pulse: 93 (05/23/22 1102)  Resp: 18 (05/23/22 1102)  BP: 130/67 (05/23/22 1109)  SpO2: 98 % (05/23/22 1102)  6' 3.5" (1.918 m)  130.2 kg (287 lb)     Physical Exam:    Physical Exam  Constitutional:       Appearance: He is well-developed.   HENT:      Head: Normocephalic.   Eyes:      Conjunctiva/sclera: Conjunctivae normal.      Pupils: " Pupils are equal, round, and reactive to light.   Cardiovascular:      Rate and Rhythm: Normal rate and regular rhythm.      Heart sounds: Normal heart sounds.   Pulmonary:      Effort: Pulmonary effort is normal.      Breath sounds: Normal breath sounds.   Abdominal:      General: Bowel sounds are normal.      Palpations: Abdomen is soft.   Musculoskeletal:         General: Normal range of motion.      Cervical back: Normal range of motion and neck supple.   Skin:     General: Skin is warm and dry.   Neurological:      Mental Status: He is alert and oriented to person, place, and time.      Deep Tendon Reflexes: Reflexes are normal and symmetric.         Laboratory      Diagnostic Results:      ASSESSMENT/PLAN:     diabetivc infcted left foot   pvd    PLAN:Plan   Debridement left foot stump today , xrays negative.

## 2022-05-23 NOTE — TRANSFER OF CARE
"Anesthesia Transfer of Care Note    Patient: Al Anna    Procedure(s) Performed: Procedure(s) (LRB):  DEBRIDEMENT, WOUND (Left)    Patient location: [facility]    Anesthesia Type: MAC    Transport from OR: Transported from OR on room air with adequate spontaneous ventilation    Post pain: adequate analgesia    Post assessment: no apparent anesthetic complications    Post vital signs: stable    Level of consciousness: awake    Nausea/Vomiting: no nausea/vomiting    Complications: none    Transfer of care protocol was followed      Last vitals:   Visit Vitals  /67   Pulse 93   Temp 36.6 °C (97.9 °F) (Skin)   Resp 18   Ht 6' 3.5" (1.918 m)   Wt 130.2 kg (287 lb)   SpO2 98%   BMI 35.40 kg/m²     "

## 2022-05-24 LAB
GRAM STN SPEC: NORMAL
GRAM STN SPEC: NORMAL

## 2022-05-27 LAB
BACTERIA SPEC AEROBE CULT: ABNORMAL
BACTERIA SPEC AEROBE CULT: ABNORMAL

## 2022-05-30 LAB
BACTERIA SPEC ANAEROBE CULT: ABNORMAL
BACTERIA SPEC ANAEROBE CULT: ABNORMAL

## 2022-08-09 ENCOUNTER — LAB VISIT (OUTPATIENT)
Dept: LAB | Facility: HOSPITAL | Age: 62
End: 2022-08-09
Attending: FAMILY MEDICINE
Payer: MEDICARE

## 2022-08-09 DIAGNOSIS — E11.65 TYPE 2 DIABETES MELLITUS WITH HYPERGLYCEMIA, WITHOUT LONG-TERM CURRENT USE OF INSULIN: ICD-10-CM

## 2022-08-09 LAB
ANION GAP SERPL CALC-SCNC: 9 MMOL/L (ref 8–16)
BUN SERPL-MCNC: 23 MG/DL (ref 8–23)
CALCIUM SERPL-MCNC: 9.5 MG/DL (ref 8.7–10.5)
CHLORIDE SERPL-SCNC: 106 MMOL/L (ref 95–110)
CO2 SERPL-SCNC: 23 MMOL/L (ref 23–29)
CREAT SERPL-MCNC: 1.7 MG/DL (ref 0.5–1.4)
EST. GFR  (NO RACE VARIABLE): 45 ML/MIN/1.73 M^2
ESTIMATED AVG GLUCOSE: 186 MG/DL (ref 68–131)
GLUCOSE SERPL-MCNC: 162 MG/DL (ref 70–110)
HBA1C MFR BLD: 8.1 % (ref 4–5.6)
POTASSIUM SERPL-SCNC: 4.7 MMOL/L (ref 3.5–5.1)
SODIUM SERPL-SCNC: 138 MMOL/L (ref 136–145)

## 2022-08-09 PROCEDURE — 83036 HEMOGLOBIN GLYCOSYLATED A1C: CPT | Performed by: FAMILY MEDICINE

## 2022-08-09 PROCEDURE — 80048 BASIC METABOLIC PNL TOTAL CA: CPT | Performed by: FAMILY MEDICINE

## 2022-08-09 PROCEDURE — 36415 COLL VENOUS BLD VENIPUNCTURE: CPT | Performed by: FAMILY MEDICINE

## 2022-08-11 PROBLEM — N18.30 TYPE 2 DIABETES MELLITUS WITH STAGE 3 CHRONIC KIDNEY DISEASE: Status: ACTIVE | Noted: 2022-08-11

## 2022-08-11 PROBLEM — E11.22 TYPE 2 DIABETES MELLITUS WITH STAGE 3 CHRONIC KIDNEY DISEASE: Status: ACTIVE | Noted: 2022-08-11

## 2022-08-23 ENCOUNTER — ANESTHESIA EVENT (OUTPATIENT)
Dept: SURGERY | Facility: HOSPITAL | Age: 62
End: 2022-08-23
Payer: MEDICARE

## 2022-08-23 ENCOUNTER — HOSPITAL ENCOUNTER (OUTPATIENT)
Dept: PREADMISSION TESTING | Facility: HOSPITAL | Age: 62
Discharge: HOME | End: 2022-08-23
Attending: SURGERY
Payer: MEDICARE

## 2022-08-23 VITALS
RESPIRATION RATE: 16 BRPM | HEART RATE: 86 BPM | BODY MASS INDEX: 35.28 KG/M2 | SYSTOLIC BLOOD PRESSURE: 121 MMHG | WEIGHT: 283.75 LBS | OXYGEN SATURATION: 97 % | HEIGHT: 75 IN | TEMPERATURE: 98 F | DIASTOLIC BLOOD PRESSURE: 57 MMHG

## 2022-08-23 RX ORDER — SODIUM CHLORIDE, SODIUM LACTATE, POTASSIUM CHLORIDE, CALCIUM CHLORIDE 600; 310; 30; 20 MG/100ML; MG/100ML; MG/100ML; MG/100ML
INJECTION, SOLUTION INTRAVENOUS CONTINUOUS
Status: CANCELLED | OUTPATIENT
Start: 2022-08-23

## 2022-08-23 RX ORDER — LIDOCAINE HYDROCHLORIDE 10 MG/ML
1 INJECTION, SOLUTION EPIDURAL; INFILTRATION; INTRACAUDAL; PERINEURAL ONCE
Status: CANCELLED | OUTPATIENT
Start: 2022-08-23 | End: 2022-08-23

## 2022-08-23 NOTE — ANESTHESIA PREPROCEDURE EVALUATION
08/23/2022  Al Anna is a 61 y.o., male scheduled for L TMA I&D. NPO confirmed. Pt had recent I&D on 5/22 under MAC with no ACs noted. Consented for post-op block if needed. Plan for MAC, standard ASA monitors.    Past Medical History:   Diagnosis Date    Coronary artery disease     MI (myocardial infarction)    COVID-19 6/16/2020    Diabetes mellitus     Neuropathy - Retinopathy - PAD    Glaucoma     High cholesterol     Hypertension      Past Surgical History:   Procedure Laterality Date    WOUND DEBRIDEMENT Left 5/23/2022    Procedure: DEBRIDEMENT, WOUND;  Surgeon: Dilip Horner MD;  [facility];  Service: General;  Laterality: Left;       Pre-op Assessment    I have reviewed the Patient Summary Reports.     I have reviewed the Nursing Notes.    I have reviewed the Medications.     Review of Systems  Anesthesia Hx:  No problems with previous Anesthesia Denies Hx of Anesthetic complications  History of prior surgery of interest to airway management or planning:  Denies Personal Hx of Anesthesia complications.   Social:  Former Smoker, Social Alcohol Use    Cardiovascular:   Exercise tolerance: good Hypertension, well controlled CAD  Dysrhythmias atrial fibrillation hyperlipidemia    Pulmonary:  Pulmonary Normal  Denies Shortness of breath.    Renal/:   Chronic Renal Disease    Hepatic/GI:   GERD, well controlled    Musculoskeletal:  Spine Disorders: lumbar    Neurological:  Neurology Normal Denies TIA.  Denies CVA. Denies Seizures.    Endocrine:   Diabetes, type 2  Obesity / BMI > 30  Psych:  Psychiatric Normal           Physical Exam  General: Well nourished and Cooperative    Airway:  Mallampati: IV / III  Mouth Opening: Normal  TM Distance: Normal  Tongue: Large  Neck ROM: Normal ROM    Dental:  Dentures  Upper denture  Chest/Lungs:  Clear to auscultation, Normal Respiratory  Rate    Heart:  Rate: Normal  Rhythm: Regular Rhythm  Sounds: Normal      Recent Results (from the past 336 hour(s))   Basic Metabolic Panel    Collection Time: 08/09/22  1:35 PM   Result Value Ref Range    Sodium 138 136 - 145 mmol/L    Potassium 4.7 3.5 - 5.1 mmol/L    Chloride 106 95 - 110 mmol/L    CO2 23 23 - 29 mmol/L    BUN 23 8 - 23 mg/dL    Creatinine 1.7 (H) 0.5 - 1.4 mg/dL    Calcium 9.5 8.7 - 10.5 mg/dL    Anion Gap 9 8 - 16 mmol/L     No results found for this or any previous visit (from the past 336 hour(s)).      Anesthesia Plan  Type of Anesthesia, risks & benefits discussed:    Anesthesia Type: MAC  Intra-op Monitoring Plan: Standard ASA Monitors  Post Op Pain Control Plan: multimodal analgesia  Induction:  IV  Informed Consent: Informed consent signed with the Patient and all parties understand the risks and agree with anesthesia plan.  All questions answered.   ASA Score: 3  Anesthesia Plan Notes:       Ready For Surgery From Anesthesia Perspective.     .

## 2022-08-23 NOTE — DISCHARGE INSTRUCTIONS
Your surgery is scheduled for 8/26/22.    Please report to [facility] on the 1st Floor at 0600 a.m.    THIS TIME IS SUBJECT TO CHANGE.  YOU WILL RECEIVE A PHONE CALL THE DAY BEFORE SURGERY BY 3:30 PM TO CONFIRM YOUR TIME OF ARRIVAL.  IF YOU HAVE NOT RECEIVED A PHONE CALL BY 3:30 PM THE DAY BEFORE YOUR SURGERY PLEASE CALL [phone]     INSTRUCTIONS IMPORTANT!!!  ¨ Do not eat or drink after 12 midnight-including water, candy, gum, & mints. OK to brush teeth.      ____  Proceed to [facility] on *** for additional testing.        ____  Do not wear makeup, including mascara.  ____  No powder, lotions or creams to surgical area.  ____  Please remove all jewelry, including piercings and leave at home.  ____  No money or valuables needed. Please leave at home.  ____  Please bring any documents given by your doctor.  ____  If going home the same day, arrange for a ride home. You will not be able to             drive if Anesthesia was used.  ____  Children under 18 years require a parent / guardian present the entire time             they are in surgery / recovery.  ____  Wear loose fitting clothing. Allow for dressings, bandages.  ____  Stop Aspirin, Ibuprofen, Motrin, Aleve, Goody's/BC powders, Excedrine and Naproxen (NSAIDS) at least 3-5 days before surgery, unless otherwise instructed by your doctor, or the nurse.   You MAY use Tylenol/acetaminophen until day of surgery.  ____  Wash the surgical area with Hibiclens the night before surgery, and again the             morning of surgery.  Be sure to rinse hibiclens off completely (if instructed by   nurse).  ____  If you take diabetic medication, do not take am of surgery unless instructed by Doctor.  ____  Call MD for temperature above 101 degrees or any other signs of infection such as Urinary (bladder) infection, Upper respiratory infection, skin boils, etc.  ____ Stop taking any Fish Oil supplement or any Vitamins that contain Vitamin E at  least 5 days prior to surgery.  ____ Do Not wear your contact lenses the day of your procedure.  You may wear your glasses.      ____Do not shave surgical site for 3 days prior to surgery.  ____ Practice Good hand washing before, during, and after procedure.      I have read or had read and explained to me, and understand the above information.  Additional comments or instructions:  For additional questions call [phone]      ANESTHESIA SIDE EFFECTS  -For the first 24 hours after surgery:  Do not drive, use heavy equipment, make important decisions, or drink alcohol  -It is normal to feel sleepy for several hours.  Rest until you are more awake.  -Have someone stay with you, if needed.  They can watch for problems and help keep you safe.  -Some possible post anesthesia side effects include: nausea and vomiting, sore throat and hoarseness, sleepiness, and dizziness.        Pre-Op Bathing Instructions    Before surgery, you can play an important role in your own health.    Because skin is not sterile, we need to be sure that your skin is as free of germs as possible. By following the instructions below, you can reduce the number of germs on your skin before surgery.    IMPORTANT: You will need to shower with a special soap called Hibiclens*, available at any pharmacy.  If you are allergic to Chlorhexidine (the antiseptic in Hibiclens), use an antibacterial soap such as Dial Soap for your preoperative shower.  You will shower with Hibiclens both the night before your surgery and the morning of your surgery.  Do not use Hibiclens on the head, face or genitals to avoid injury to those areas.    STEP #1: THE NIGHT BEFORE YOUR SURGERY     Do not shave the area of your body where your surgery will be performed.  Shower and wash your hair and body as usual with your normal soap and shampoo.  Rinse your hair and body thoroughly after you shower to remove all soap residue.  With your hand, apply one packet of Hibiclens soap to  the surgical site.   Wash the site gently for five (5) minutes. Do not scrub your skin too hard.   Do not wash with your regular soap after Hibiclens is used.  Rinse your body thoroughly.  Pat yourself dry with a clean, soft towel.  Do not use lotion, cream, or powder.  Wear clean clothes.    STEP #2: THE MORNING OF YOUR SURGERY     Repeat Step #1.    * Not to be used by people allergic to Chlorhexidine.

## 2022-08-23 NOTE — PRE-PROCEDURE INSTRUCTIONS
Cassia Anna [phone]    Allergies, medical, surgical, family and psychosocial histories reviewed with patient. Periop plan of care reviewed. Preop instructions given, including medications to take and to hold. Hibiclens soap and instructions on use given. Time allotted for questions to be addressed.  Patient verbalized understanding.

## 2022-08-26 ENCOUNTER — HOSPITAL ENCOUNTER (OUTPATIENT)
Facility: HOSPITAL | Age: 62
Discharge: HOME | End: 2022-08-26
Attending: SURGERY | Admitting: SURGERY
Payer: MEDICARE

## 2022-08-26 ENCOUNTER — ANESTHESIA (OUTPATIENT)
Dept: SURGERY | Facility: HOSPITAL | Age: 62
End: 2022-08-26
Payer: MEDICARE

## 2022-08-26 VITALS
OXYGEN SATURATION: 97 % | HEART RATE: 73 BPM | DIASTOLIC BLOOD PRESSURE: 51 MMHG | HEIGHT: 76 IN | WEIGHT: 293 LBS | SYSTOLIC BLOOD PRESSURE: 96 MMHG | RESPIRATION RATE: 15 BRPM | TEMPERATURE: 98 F | BODY MASS INDEX: 35.68 KG/M2

## 2022-08-26 DIAGNOSIS — I73.9 PVD (PERIPHERAL VASCULAR DISEASE): ICD-10-CM

## 2022-08-26 DIAGNOSIS — E66.01 MORBID OBESITY: Chronic | ICD-10-CM

## 2022-08-26 DIAGNOSIS — T87.89 NON-HEALING WOUND OF AMPUTATION STUMP: Primary | ICD-10-CM

## 2022-08-26 DIAGNOSIS — Z79.4 TYPE 2 DIABETES MELLITUS WITH OTHER CIRCULATORY COMPLICATION, WITH LONG-TERM CURRENT USE OF INSULIN: ICD-10-CM

## 2022-08-26 DIAGNOSIS — E11.59 TYPE 2 DIABETES MELLITUS WITH OTHER CIRCULATORY COMPLICATION, WITH LONG-TERM CURRENT USE OF INSULIN: ICD-10-CM

## 2022-08-26 DIAGNOSIS — Z72.0 TOBACCO USE: ICD-10-CM

## 2022-08-26 PROBLEM — E11.9 DIABETES MELLITUS: Status: ACTIVE | Noted: 2022-08-11

## 2022-08-26 LAB — POCT GLUCOSE: 186 MG/DL (ref 70–110)

## 2022-08-26 PROCEDURE — 87077 CULTURE AEROBIC IDENTIFY: CPT | Mod: 59 | Performed by: SURGERY

## 2022-08-26 PROCEDURE — 87102 FUNGUS ISOLATION CULTURE: CPT | Performed by: SURGERY

## 2022-08-26 PROCEDURE — 36000707: Performed by: SURGERY

## 2022-08-26 PROCEDURE — 64447 NJX AA&/STRD FEMORAL NRV IMG: CPT | Performed by: STUDENT IN AN ORGANIZED HEALTH CARE EDUCATION/TRAINING PROGRAM

## 2022-08-26 PROCEDURE — 88305 TISSUE EXAM BY PATHOLOGIST: CPT | Performed by: PATHOLOGY

## 2022-08-26 PROCEDURE — 87176 TISSUE HOMOGENIZATION CULTR: CPT | Performed by: SURGERY

## 2022-08-26 PROCEDURE — 88305 TISSUE EXAM BY PATHOLOGIST: CPT | Mod: 26,,, | Performed by: PATHOLOGY

## 2022-08-26 PROCEDURE — 25000003 PHARM REV CODE 250: Performed by: NURSE ANESTHETIST, CERTIFIED REGISTERED

## 2022-08-26 PROCEDURE — 87186 SC STD MICRODIL/AGAR DIL: CPT | Performed by: SURGERY

## 2022-08-26 PROCEDURE — 87075 CULTR BACTERIA EXCEPT BLOOD: CPT | Performed by: SURGERY

## 2022-08-26 PROCEDURE — 87076 CULTURE ANAEROBE IDENT EACH: CPT | Performed by: SURGERY

## 2022-08-26 PROCEDURE — 63600175 PHARM REV CODE 636 W HCPCS: Performed by: NURSE ANESTHETIST, CERTIFIED REGISTERED

## 2022-08-26 PROCEDURE — 88305 TISSUE EXAM BY PATHOLOGIST: ICD-10-PCS | Mod: 26,,, | Performed by: PATHOLOGY

## 2022-08-26 PROCEDURE — 87070 CULTURE OTHR SPECIMN AEROBIC: CPT | Performed by: SURGERY

## 2022-08-26 PROCEDURE — 71000015 HC POSTOP RECOV 1ST HR: Performed by: SURGERY

## 2022-08-26 PROCEDURE — 36000706: Performed by: SURGERY

## 2022-08-26 PROCEDURE — 71000016 HC POSTOP RECOV ADDL HR: Performed by: SURGERY

## 2022-08-26 PROCEDURE — 37000009 HC ANESTHESIA EA ADD 15 MINS: Performed by: SURGERY

## 2022-08-26 PROCEDURE — 37000008 HC ANESTHESIA 1ST 15 MINUTES: Performed by: SURGERY

## 2022-08-26 DEVICE — IMPLANTABLE DEVICE: Type: IMPLANTABLE DEVICE | Site: FOOT | Status: FUNCTIONAL

## 2022-08-26 RX ORDER — ACETAMINOPHEN 325 MG/1
650 TABLET ORAL EVERY 4 HOURS PRN
Status: CANCELLED | OUTPATIENT
Start: 2022-08-26

## 2022-08-26 RX ORDER — PROPOFOL 10 MG/ML
VIAL (ML) INTRAVENOUS CONTINUOUS PRN
Status: DISCONTINUED | OUTPATIENT
Start: 2022-08-26 | End: 2022-08-26

## 2022-08-26 RX ORDER — HYDROCODONE BITARTRATE AND ACETAMINOPHEN 5; 325 MG/1; MG/1
1 TABLET ORAL EVERY 6 HOURS PRN
Qty: 24 TABLET | Refills: 0 | Status: ON HOLD | OUTPATIENT
Start: 2022-08-26 | End: 2023-07-17 | Stop reason: HOSPADM

## 2022-08-26 RX ORDER — HYDROCODONE BITARTRATE AND ACETAMINOPHEN 5; 325 MG/1; MG/1
1 TABLET ORAL EVERY 4 HOURS PRN
Status: CANCELLED | OUTPATIENT
Start: 2022-08-26

## 2022-08-26 RX ORDER — MIDAZOLAM HYDROCHLORIDE 1 MG/ML
INJECTION, SOLUTION INTRAMUSCULAR; INTRAVENOUS
Status: DISCONTINUED | OUTPATIENT
Start: 2022-08-26 | End: 2022-08-26

## 2022-08-26 RX ORDER — LIDOCAINE HYDROCHLORIDE 20 MG/ML
INJECTION INTRAVENOUS
Status: DISCONTINUED | OUTPATIENT
Start: 2022-08-26 | End: 2022-08-26

## 2022-08-26 RX ORDER — CLINDAMYCIN PHOSPHATE 900 MG/50ML
INJECTION, SOLUTION INTRAVENOUS
Status: DISCONTINUED | OUTPATIENT
Start: 2022-08-26 | End: 2022-08-26

## 2022-08-26 RX ORDER — SODIUM CHLORIDE 9 MG/ML
INJECTION, SOLUTION INTRAVENOUS CONTINUOUS
Status: CANCELLED | OUTPATIENT
Start: 2022-08-26

## 2022-08-26 RX ORDER — SODIUM CHLORIDE, SODIUM LACTATE, POTASSIUM CHLORIDE, CALCIUM CHLORIDE 600; 310; 30; 20 MG/100ML; MG/100ML; MG/100ML; MG/100ML
INJECTION, SOLUTION INTRAVENOUS CONTINUOUS
Status: DISCONTINUED | OUTPATIENT
Start: 2022-08-26 | End: 2022-08-26 | Stop reason: HOSPADM

## 2022-08-26 RX ORDER — LIDOCAINE HYDROCHLORIDE 10 MG/ML
1 INJECTION, SOLUTION EPIDURAL; INFILTRATION; INTRACAUDAL; PERINEURAL ONCE
Status: DISCONTINUED | OUTPATIENT
Start: 2022-08-26 | End: 2022-08-26 | Stop reason: HOSPADM

## 2022-08-26 RX ORDER — PHENYLEPHRINE HYDROCHLORIDE 10 MG/ML
INJECTION INTRAVENOUS
Status: DISCONTINUED | OUTPATIENT
Start: 2022-08-26 | End: 2022-08-26

## 2022-08-26 RX ORDER — PROPOFOL 10 MG/ML
VIAL (ML) INTRAVENOUS
Status: DISCONTINUED | OUTPATIENT
Start: 2022-08-26 | End: 2022-08-26

## 2022-08-26 RX ADMIN — SODIUM CHLORIDE: 0.9 INJECTION, SOLUTION INTRAVENOUS at 07:08

## 2022-08-26 RX ADMIN — CLINDAMYCIN IN 5 PERCENT DEXTROSE 900 MG: 18 INJECTION, SOLUTION INTRAVENOUS at 08:08

## 2022-08-26 RX ADMIN — PROPOFOL 40 MG: 10 INJECTION, EMULSION INTRAVENOUS at 08:08

## 2022-08-26 RX ADMIN — PHENYLEPHRINE HYDROCHLORIDE 100 MCG: 10 INJECTION INTRAVENOUS at 08:08

## 2022-08-26 RX ADMIN — MIDAZOLAM 2 MG: 1 INJECTION INTRAMUSCULAR; INTRAVENOUS at 08:08

## 2022-08-26 RX ADMIN — PROPOFOL 150 MCG/KG/MIN: 10 INJECTION, EMULSION INTRAVENOUS at 08:08

## 2022-08-26 RX ADMIN — LIDOCAINE HYDROCHLORIDE 100 MG: 20 INJECTION, SOLUTION INTRAVENOUS at 08:08

## 2022-08-26 NOTE — TRANSFER OF CARE
"Anesthesia Transfer of Care Note    Patient: Al Anna    Procedure(s) Performed: Procedure(s) (LRB):  DEBRIDEMENT, WOUND (Left)  DEBRIDEMENT, ULCER, WITH SKIN GRAFT APPLICATION (Left)    Patient location: [facility]    Anesthesia Type: MAC    Transport from OR: Transported from OR on room air with adequate spontaneous ventilation    Post pain: adequate analgesia    Post assessment: no apparent anesthetic complications and tolerated procedure well    Post vital signs: stable    Level of consciousness: awake, alert and oriented    Nausea/Vomiting: no nausea/vomiting    Complications: none    Transfer of care protocol was followed      Last vitals:   Visit Vitals  BP (!) 96/55   Pulse 77   Temp 36.6 °C (97.8 °F) (Skin)   Resp 15   Ht 6' 4" (1.93 m)   Wt 132.9 kg (293 lb)   SpO2 95%   BMI 35.67 kg/m²     "

## 2022-08-26 NOTE — ANESTHESIA PROCEDURE NOTES
Peripheral Block    Patient location during procedure: pre-op   Block not for primary anesthetic.  Reason for block: at surgeon's request and post-op pain management   Post-op Pain Location: left foot   Start time: 8/26/2022 9:41 AM  Timeout: 8/26/2022 9:40 AM   End time: 8/26/2022 9:50 AM    Staffing  Authorizing Provider: Brayan Mays MD  Performing Provider: Jonh Torres MD    Preanesthetic Checklist  Completed: patient identified, IV checked, site marked, risks and benefits discussed, surgical consent, monitors and equipment checked, pre-op evaluation and timeout performed  Peripheral Block  Patient position: supine  Prep: ChloraPrep  Patient monitoring: heart rate, cardiac monitor, continuous pulse ox, continuous capnometry and frequent blood pressure checks  Block type: adductor canal and popliteal  Laterality: left  Injection technique: single shot  Needle  Needle type: Stimuplex   Needle gauge: 21 G  Needle length: 4 in  Needle localization: anatomical landmarks and ultrasound guidance     Assessment  Injection assessment: negative aspiration, negative parasthesia and local visualized surrounding nerve  Paresthesia pain: none  Heart rate change: no  Slow fractionated injection: yes  Pain Tolerance: comfortable throughout block and no complaints      Additional Notes  VSS.  [facility] RN monitoring vitals throughout procedure.  Patient tolerated procedure well.     Saphenous nerve, Femoral artery and vein, sartorius, vastus medialis and adductor shabana identified under ultrasound. Area cleaned with chloraprep prior to insertion of stimuplex needle. Prior to local injection, aspiration negative for heme. Saphenous nerve surrounded by local anesthetic and visualized under ultrasound - tracked distally to see nerve with continuous local anesthetic coverage. No complaints of parasthesias or discomfort throughout block.     Tibial nerve, common peroneal nerve, popliteal artery and vein, biceps  femoris, and semimembranosis identified under ultrasound. Area cleaned with chloraprep prior to insertion of stimuplex needle. Prior to local injection, aspiration negative for heme. Common peroneal and tibial nerve surrounded by local anesthetic and visualized under ultrasound - tracked distally to see nerve with continuous local anesthetic coverage. No complaints of parasthesias or discomfort throughout block.

## 2022-08-26 NOTE — OP NOTE
[place] - Surgery (Hospital)  Operative Note      Date of Procedure: 8/26/2022     Procedure: Procedure(s) (LRB):  DEBRIDEMENT, WOUND (Left)  DEBRIDEMENT, ULCER, WITH SKIN GRAFT APPLICATION (Left)   10 x 7 cm with had a skin skin graft  Surgeon(s) and Role:     * Dilip Horner MD - Primary    Assisting Surgeon: None    Pre-Operative Diagnosis: Non-healing wound of amputation stump [T87.89]  Type 2 diabetes mellitus with other circulatory complication, with long-term current use of insulin [E11.59, Z79.4]  PVD (peripheral vascular disease) [I73.9]    Post-Operative Diagnosis: Post-Op Diagnosis Codes:     * Non-healing wound of amputation stump [T87.89]     * Type 2 diabetes mellitus with other circulatory complication, with long-term current use of insulin [E11.59, Z79.4]     * PVD (peripheral vascular disease) [I73.9]    Anesthesia: Local MAC    Operative Findings (including complications, if any):  Excision and debridement left foot metatarsal amputation stump done under MAC anesthesia size 10 x 7 cm ultrasonic debrided was also used wound was then dressed using Thera skin which was sewed in place using interrupted 3-0 Vicryl Xeroform 4 x 4 Coban dressing was applied patient tolerated well estimated blood loss 50 cc specimen removed infected tissue sent to recovery room in stable condition.    Description of Technical Procedures:  After satisfactory IV sedation patient in supine position time-out was called patient identity confirmed site was confirmed left foot prepped draped normal sterile manner using Betadine scrub solution stockinette was applied area infected forefoot size 7 x  10 by cm was infiltrated using 1% xylocaine solution with the help of knife and Jaybaum infected necrotic tissue was excised excised with the help of knife and Jaybaum harmonic ultrasonic debrided were was also used to smoothen out the wound edges wound was cauterized using electric cautery was thoroughly irrigated  antibiotic solution hemostasis was maintained since it was divided wound area was then closed using a split-thickness Thera skin graft  3 x 4 in and was sewed in place using interrupted 3 0 Vicryl Xeroform 4 x 4 Kerlix along with a Coban dressing was applied patient tolerated well estimated blood loss 50 cc specimen removed infected tissue sent for culture and pathology patient was sent to recovery room in stable condition.    Significant Surgical Tasks Conducted by the Assistant(s), if Applicable: na    Estimated Blood Loss (EBL) 50 cc        Implants: * No implants in log *    Specimens:   Specimen (24h ago, onward)             Start     Ordered    08/26/22 0857  Specimen to Pathology, Surgery General Surgery  Once        Comments: Pre-op Diagnosis: Non-healing wound of amputation stump [T87.89]Type 2 diabetes mellitus with other circulatory complication, with long-term current use of insulin [E11.59, Z79.4]PVD (peripheral vascular disease) [I73.9]Procedure(s):DEBRIDEMENT, WOUND Number of specimens: 1Name of specimens: LEFT FOOT INFEFTED TISSUE -PERM     References:    Click here for ordering Quick Tip   Question Answer Comment   Procedure Type: General Surgery    Which provider would you like to cc? HUMBERTO CALLAHAN    Release to patient Immediate        08/26/22 0856                        Condition: Good    Disposition: PACU - hemodynamically stable.    Attestation: I performed the procedure.    Discharge Note    OUTCOME: Patient tolerated treatment/procedure well without complication and is now ready for discharge.    DISPOSITION: Home or Self Care    FINAL DIAGNOSIS:  Non-healing wound of amputation stump    FOLLOWUP: In clinic5    DISCHARGE INSTRUCTIONS:    Discharge Procedure Orders   Diet general     Keep surgical extremity elevated     Lifting restrictions     Leave dressing on - Keep it clean, dry, and intact until clinic visit     Call MD for:  temperature >100.4     Call MD for:  persistent nausea  and vomiting     Call MD for:  severe uncontrolled pain     Call MD for:  redness, tenderness, or signs of infection (pain, swelling, redness, odor or green/yellow discharge around incision site)

## 2022-08-26 NOTE — ANESTHESIA POSTPROCEDURE EVALUATION
Anesthesia Post Evaluation    Patient: Al Anna    Procedure(s) Performed: Procedure(s) (LRB):  DEBRIDEMENT, WOUND (Left)  DEBRIDEMENT, ULCER, WITH SKIN GRAFT APPLICATION (Left)    Final Anesthesia Type: MAC      Patient location during evaluation: [facility]  Patient participation: Yes- Able to Participate  Level of consciousness: awake and alert and oriented  Post-procedure vital signs: reviewed and stable  Pain management: adequate  Airway patency: patent    PONV status at discharge: No PONV  Anesthetic complications: no      Cardiovascular status: blood pressure returned to baseline, hemodynamically stable and stable  Respiratory status: unassisted, spontaneous ventilation and room air  Hydration status: euvolemic  Follow-up not needed.          Vitals Value Taken Time   BP 96/55 08/26/22 0950   Temp 36.6 °C (97.8 °F) 08/26/22 0915   Pulse 77 08/26/22 0950   Resp 15 08/26/22 0950   SpO2 95 % 08/26/22 0950         No case tracking events are documented in the log.      Pain/Darby Score: Darby Score: 10 (8/26/2022 10:00 AM)

## 2022-08-29 LAB — BACTERIA SPEC AEROBE CULT: ABNORMAL

## 2022-08-30 LAB
BACTERIA SPEC ANAEROBE CULT: ABNORMAL
FINAL PATHOLOGIC DIAGNOSIS: NORMAL
GROSS: NORMAL
Lab: NORMAL

## 2022-09-26 LAB — FUNGUS SPEC CULT: NORMAL

## 2022-11-01 NOTE — PLAN OF CARE
VSS  NAD  Discharge instructions given with claimed understanding by pt and family. Patient has ride home with family or friend. Claims pain level is __0____ at this time.  Has voided without difficulty if required by surgical type.      Humira Counseling:  I discussed with the patient the risks of adalimumab including but not limited to myelosuppression, immunosuppression, autoimmune hepatitis, demyelinating diseases, lymphoma, and serious infections.  The patient understands that monitoring is required including a PPD at baseline and must alert us or the primary physician if symptoms of infection or other concerning signs are noted.

## 2023-05-16 ENCOUNTER — LAB VISIT (OUTPATIENT)
Dept: LAB | Facility: HOSPITAL | Age: 63
End: 2023-05-16
Attending: FAMILY MEDICINE
Payer: MEDICARE

## 2023-05-16 DIAGNOSIS — I10 ESSENTIAL HYPERTENSION: ICD-10-CM

## 2023-05-16 DIAGNOSIS — E11.51 TYPE 2 DIABETES MELLITUS WITH PERIPHERAL ANGIOPATHY: ICD-10-CM

## 2023-05-16 DIAGNOSIS — E11.22 TYPE 2 DIABETES MELLITUS WITH STAGE 3A CHRONIC KIDNEY DISEASE, WITHOUT LONG-TERM CURRENT USE OF INSULIN: ICD-10-CM

## 2023-05-16 DIAGNOSIS — N18.31 TYPE 2 DIABETES MELLITUS WITH STAGE 3A CHRONIC KIDNEY DISEASE, WITHOUT LONG-TERM CURRENT USE OF INSULIN: ICD-10-CM

## 2023-05-16 DIAGNOSIS — E78.00 HIGH CHOLESTEROL: ICD-10-CM

## 2023-05-16 PROBLEM — E66.01 MORBID OBESITY: Chronic | Status: RESOLVED | Noted: 2019-02-26 | Resolved: 2023-05-16

## 2023-05-16 LAB
ALBUMIN SERPL BCP-MCNC: 3.6 G/DL (ref 3.5–5.2)
ALBUMIN SERPL BCP-MCNC: 3.6 G/DL (ref 3.5–5.2)
ALBUMIN/CREAT UR: 5.9 UG/MG (ref 0–30)
ALP SERPL-CCNC: 61 U/L (ref 55–135)
ALP SERPL-CCNC: 61 U/L (ref 55–135)
ALT SERPL W/O P-5'-P-CCNC: 12 U/L (ref 10–44)
ALT SERPL W/O P-5'-P-CCNC: 12 U/L (ref 10–44)
ANION GAP SERPL CALC-SCNC: 10 MMOL/L (ref 8–16)
ANION GAP SERPL CALC-SCNC: 10 MMOL/L (ref 8–16)
AST SERPL-CCNC: 13 U/L (ref 10–40)
AST SERPL-CCNC: 13 U/L (ref 10–40)
BASOPHILS # BLD AUTO: 0.07 K/UL (ref 0–0.2)
BASOPHILS NFR BLD: 1 % (ref 0–1.9)
BILIRUB SERPL-MCNC: 0.5 MG/DL (ref 0.1–1)
BILIRUB SERPL-MCNC: 0.5 MG/DL (ref 0.1–1)
BUN SERPL-MCNC: 38 MG/DL (ref 8–23)
BUN SERPL-MCNC: 38 MG/DL (ref 8–23)
CALCIUM SERPL-MCNC: 9.8 MG/DL (ref 8.7–10.5)
CALCIUM SERPL-MCNC: 9.8 MG/DL (ref 8.7–10.5)
CHLORIDE SERPL-SCNC: 109 MMOL/L (ref 95–110)
CHLORIDE SERPL-SCNC: 109 MMOL/L (ref 95–110)
CHOLEST SERPL-MCNC: 170 MG/DL (ref 120–199)
CHOLEST SERPL-MCNC: 170 MG/DL (ref 120–199)
CHOLEST/HDLC SERPL: 4 {RATIO} (ref 2–5)
CHOLEST/HDLC SERPL: 4 {RATIO} (ref 2–5)
CO2 SERPL-SCNC: 21 MMOL/L (ref 23–29)
CO2 SERPL-SCNC: 21 MMOL/L (ref 23–29)
CREAT SERPL-MCNC: 1.9 MG/DL (ref 0.5–1.4)
CREAT SERPL-MCNC: 1.9 MG/DL (ref 0.5–1.4)
CREAT UR-MCNC: 85 MG/DL (ref 23–375)
DIFFERENTIAL METHOD: ABNORMAL
EOSINOPHIL # BLD AUTO: 0.2 K/UL (ref 0–0.5)
EOSINOPHIL NFR BLD: 3.3 % (ref 0–8)
ERYTHROCYTE [DISTWIDTH] IN BLOOD BY AUTOMATED COUNT: 15.6 % (ref 11.5–14.5)
EST. GFR  (NO RACE VARIABLE): 39 ML/MIN/1.73 M^2
EST. GFR  (NO RACE VARIABLE): 39 ML/MIN/1.73 M^2
ESTIMATED AVG GLUCOSE: 214 MG/DL (ref 68–131)
ESTIMATED AVG GLUCOSE: 214 MG/DL (ref 68–131)
GLUCOSE SERPL-MCNC: 198 MG/DL (ref 70–110)
GLUCOSE SERPL-MCNC: 198 MG/DL (ref 70–110)
HBA1C MFR BLD: 9.1 % (ref 4–5.6)
HBA1C MFR BLD: 9.1 % (ref 4–5.6)
HCT VFR BLD AUTO: 43.5 % (ref 40–54)
HDLC SERPL-MCNC: 43 MG/DL (ref 40–75)
HDLC SERPL-MCNC: 43 MG/DL (ref 40–75)
HDLC SERPL: 25.3 % (ref 20–50)
HDLC SERPL: 25.3 % (ref 20–50)
HGB BLD-MCNC: 13.9 G/DL (ref 14–18)
IMM GRANULOCYTES # BLD AUTO: 0.04 K/UL (ref 0–0.04)
IMM GRANULOCYTES NFR BLD AUTO: 0.6 % (ref 0–0.5)
LDLC SERPL CALC-MCNC: 103.6 MG/DL (ref 63–159)
LDLC SERPL CALC-MCNC: 103.6 MG/DL (ref 63–159)
LYMPHOCYTES # BLD AUTO: 3.4 K/UL (ref 1–4.8)
LYMPHOCYTES NFR BLD: 47.3 % (ref 18–48)
MCH RBC QN AUTO: 27.9 PG (ref 27–31)
MCHC RBC AUTO-ENTMCNC: 32 G/DL (ref 32–36)
MCV RBC AUTO: 87 FL (ref 82–98)
MICROALBUMIN UR DL<=1MG/L-MCNC: 5 UG/ML
MONOCYTES # BLD AUTO: 0.6 K/UL (ref 0.3–1)
MONOCYTES NFR BLD: 7.9 % (ref 4–15)
NEUTROPHILS # BLD AUTO: 2.9 K/UL (ref 1.8–7.7)
NEUTROPHILS NFR BLD: 39.9 % (ref 38–73)
NONHDLC SERPL-MCNC: 127 MG/DL
NONHDLC SERPL-MCNC: 127 MG/DL
NRBC BLD-RTO: 0 /100 WBC
PLATELET # BLD AUTO: 189 K/UL (ref 150–450)
PMV BLD AUTO: 11.5 FL (ref 9.2–12.9)
POTASSIUM SERPL-SCNC: 5.2 MMOL/L (ref 3.5–5.1)
POTASSIUM SERPL-SCNC: 5.2 MMOL/L (ref 3.5–5.1)
PROT SERPL-MCNC: 7.7 G/DL (ref 6–8.4)
PROT SERPL-MCNC: 7.7 G/DL (ref 6–8.4)
PTH-INTACT SERPL-MCNC: 73.5 PG/ML (ref 9–77)
PTH-INTACT SERPL-MCNC: 73.5 PG/ML (ref 9–77)
RBC # BLD AUTO: 4.98 M/UL (ref 4.6–6.2)
SODIUM SERPL-SCNC: 140 MMOL/L (ref 136–145)
SODIUM SERPL-SCNC: 140 MMOL/L (ref 136–145)
TRIGL SERPL-MCNC: 117 MG/DL (ref 30–150)
TRIGL SERPL-MCNC: 117 MG/DL (ref 30–150)
WBC # BLD AUTO: 7.23 K/UL (ref 3.9–12.7)

## 2023-05-16 PROCEDURE — 85025 COMPLETE CBC W/AUTO DIFF WBC: CPT | Performed by: FAMILY MEDICINE

## 2023-05-16 PROCEDURE — 80053 COMPREHEN METABOLIC PANEL: CPT | Performed by: FAMILY MEDICINE

## 2023-05-16 PROCEDURE — 36415 COLL VENOUS BLD VENIPUNCTURE: CPT | Performed by: FAMILY MEDICINE

## 2023-05-16 PROCEDURE — 80061 LIPID PANEL: CPT | Performed by: FAMILY MEDICINE

## 2023-05-16 PROCEDURE — 83036 HEMOGLOBIN GLYCOSYLATED A1C: CPT | Performed by: FAMILY MEDICINE

## 2023-05-16 PROCEDURE — 83970 ASSAY OF PARATHORMONE: CPT | Performed by: FAMILY MEDICINE

## 2023-05-16 PROCEDURE — 82570 ASSAY OF URINE CREATININE: CPT | Mod: 91 | Performed by: FAMILY MEDICINE

## 2023-07-13 ENCOUNTER — HOSPITAL ENCOUNTER (OUTPATIENT)
Dept: RADIOLOGY | Facility: HOSPITAL | Age: 63
Discharge: HOME | End: 2023-07-13
Attending: SURGERY
Payer: MEDICARE

## 2023-07-13 DIAGNOSIS — E11.40 TYPE 2 DIABETES MELLITUS WITH DIABETIC NEUROPATHY, WITH LONG-TERM CURRENT USE OF INSULIN: Chronic | ICD-10-CM

## 2023-07-13 DIAGNOSIS — S91.302A WOUND OF LEFT FOOT: ICD-10-CM

## 2023-07-13 DIAGNOSIS — M86.9 OSTEOMYELITIS OF LEFT FOOT, UNSPECIFIED TYPE: ICD-10-CM

## 2023-07-13 DIAGNOSIS — E11.40 TYPE 2 DIABETES MELLITUS WITH DIABETIC NEUROPATHY, WITH LONG-TERM CURRENT USE OF INSULIN: ICD-10-CM

## 2023-07-13 DIAGNOSIS — I10 ESSENTIAL HYPERTENSION: ICD-10-CM

## 2023-07-13 DIAGNOSIS — I73.9 PVD (PERIPHERAL VASCULAR DISEASE): ICD-10-CM

## 2023-07-13 DIAGNOSIS — Z79.4 TYPE 2 DIABETES MELLITUS WITH DIABETIC NEUROPATHY, WITH LONG-TERM CURRENT USE OF INSULIN: ICD-10-CM

## 2023-07-13 DIAGNOSIS — Z79.4 TYPE 2 DIABETES MELLITUS WITH DIABETIC NEUROPATHY, WITH LONG-TERM CURRENT USE OF INSULIN: Chronic | ICD-10-CM

## 2023-07-13 PROCEDURE — 73630 X-RAY EXAM OF FOOT: CPT | Mod: TC,FY,LT

## 2023-07-13 PROCEDURE — 73630 X-RAY EXAM OF FOOT: CPT | Mod: 26,LT,, | Performed by: RADIOLOGY

## 2023-07-13 PROCEDURE — 73630 XR FOOT COMPLETE 3 VIEW LEFT: ICD-10-PCS | Mod: 26,LT,, | Performed by: RADIOLOGY

## 2023-07-17 ENCOUNTER — HOSPITAL ENCOUNTER (OUTPATIENT)
Facility: HOSPITAL | Age: 63
Discharge: HOME | End: 2023-07-17
Attending: SURGERY | Admitting: SURGERY
Payer: MEDICARE

## 2023-07-17 ENCOUNTER — ANESTHESIA (OUTPATIENT)
Dept: SURGERY | Facility: HOSPITAL | Age: 63
End: 2023-07-17
Payer: MEDICARE

## 2023-07-17 ENCOUNTER — ANESTHESIA EVENT (OUTPATIENT)
Dept: SURGERY | Facility: HOSPITAL | Age: 63
End: 2023-07-17
Payer: MEDICARE

## 2023-07-17 VITALS
HEIGHT: 76 IN | BODY MASS INDEX: 31.29 KG/M2 | RESPIRATION RATE: 19 BRPM | DIASTOLIC BLOOD PRESSURE: 59 MMHG | OXYGEN SATURATION: 95 % | WEIGHT: 257 LBS | SYSTOLIC BLOOD PRESSURE: 105 MMHG | TEMPERATURE: 98 F | HEART RATE: 63 BPM

## 2023-07-17 DIAGNOSIS — I73.9 PVD (PERIPHERAL VASCULAR DISEASE): ICD-10-CM

## 2023-07-17 DIAGNOSIS — E11.40 TYPE 2 DIABETES MELLITUS WITH DIABETIC NEUROPATHY, WITH LONG-TERM CURRENT USE OF INSULIN: Chronic | ICD-10-CM

## 2023-07-17 DIAGNOSIS — I10 ESSENTIAL HYPERTENSION: Chronic | ICD-10-CM

## 2023-07-17 DIAGNOSIS — M86.9 OSTEOMYELITIS OF LEFT FOOT, UNSPECIFIED TYPE: ICD-10-CM

## 2023-07-17 DIAGNOSIS — Z79.4 TYPE 2 DIABETES MELLITUS WITH DIABETIC NEUROPATHY, WITH LONG-TERM CURRENT USE OF INSULIN: Chronic | ICD-10-CM

## 2023-07-17 DIAGNOSIS — S91.302A WOUND OF LEFT FOOT: Primary | ICD-10-CM

## 2023-07-17 LAB
ANION GAP SERPL CALC-SCNC: 9 MMOL/L (ref 8–16)
BUN SERPL-MCNC: 27 MG/DL (ref 8–23)
CALCIUM SERPL-MCNC: 9 MG/DL (ref 8.7–10.5)
CHLORIDE SERPL-SCNC: 109 MMOL/L (ref 95–110)
CO2 SERPL-SCNC: 21 MMOL/L (ref 23–29)
CREAT SERPL-MCNC: 1.7 MG/DL (ref 0.5–1.4)
EST. GFR  (NO RACE VARIABLE): 45 ML/MIN/1.73 M^2
GLUCOSE SERPL-MCNC: 175 MG/DL (ref 70–110)
POCT GLUCOSE: 169 MG/DL (ref 70–110)
POTASSIUM SERPL-SCNC: 4.6 MMOL/L (ref 3.5–5.1)
SODIUM SERPL-SCNC: 139 MMOL/L (ref 136–145)

## 2023-07-17 PROCEDURE — 63600175 PHARM REV CODE 636 W HCPCS: Performed by: ANESTHESIOLOGY

## 2023-07-17 PROCEDURE — 87106 FUNGI IDENTIFICATION YEAST: CPT | Performed by: SURGERY

## 2023-07-17 PROCEDURE — 88311 DECALCIFY TISSUE: CPT | Mod: 26,,, | Performed by: STUDENT IN AN ORGANIZED HEALTH CARE EDUCATION/TRAINING PROGRAM

## 2023-07-17 PROCEDURE — 64445 NJX AA&/STRD SCIATIC NRV IMG: CPT | Mod: LT | Performed by: ANESTHESIOLOGY

## 2023-07-17 PROCEDURE — 87077 CULTURE AEROBIC IDENTIFY: CPT | Performed by: SURGERY

## 2023-07-17 PROCEDURE — 87102 FUNGUS ISOLATION CULTURE: CPT | Performed by: SURGERY

## 2023-07-17 PROCEDURE — 88305 TISSUE EXAM BY PATHOLOGIST: CPT | Performed by: STUDENT IN AN ORGANIZED HEALTH CARE EDUCATION/TRAINING PROGRAM

## 2023-07-17 PROCEDURE — 36000707: Performed by: SURGERY

## 2023-07-17 PROCEDURE — 71000015 HC POSTOP RECOV 1ST HR: Performed by: SURGERY

## 2023-07-17 PROCEDURE — 87076 CULTURE ANAEROBE IDENT EACH: CPT | Performed by: SURGERY

## 2023-07-17 PROCEDURE — 37000008 HC ANESTHESIA 1ST 15 MINUTES: Performed by: SURGERY

## 2023-07-17 PROCEDURE — D9220A PRA ANESTHESIA: ICD-10-PCS | Mod: ANES,,, | Performed by: ANESTHESIOLOGY

## 2023-07-17 PROCEDURE — 63600175 PHARM REV CODE 636 W HCPCS: Performed by: NURSE ANESTHETIST, CERTIFIED REGISTERED

## 2023-07-17 PROCEDURE — 80048 BASIC METABOLIC PNL TOTAL CA: CPT | Performed by: SURGERY

## 2023-07-17 PROCEDURE — 87186 SC STD MICRODIL/AGAR DIL: CPT | Performed by: SURGERY

## 2023-07-17 PROCEDURE — 36415 COLL VENOUS BLD VENIPUNCTURE: CPT | Performed by: SURGERY

## 2023-07-17 PROCEDURE — 88304 TISSUE EXAM BY PATHOLOGIST: CPT | Performed by: STUDENT IN AN ORGANIZED HEALTH CARE EDUCATION/TRAINING PROGRAM

## 2023-07-17 PROCEDURE — D9220A PRA ANESTHESIA: Mod: CRNA,,, | Performed by: NURSE ANESTHETIST, CERTIFIED REGISTERED

## 2023-07-17 PROCEDURE — 36000706: Performed by: SURGERY

## 2023-07-17 PROCEDURE — 88304 TISSUE EXAM BY PATHOLOGIST: CPT | Mod: 26,,, | Performed by: STUDENT IN AN ORGANIZED HEALTH CARE EDUCATION/TRAINING PROGRAM

## 2023-07-17 PROCEDURE — 88304 PR  SURG PATH,LEVEL III: ICD-10-PCS | Mod: 26,,, | Performed by: STUDENT IN AN ORGANIZED HEALTH CARE EDUCATION/TRAINING PROGRAM

## 2023-07-17 PROCEDURE — 87116 MYCOBACTERIA CULTURE: CPT | Performed by: SURGERY

## 2023-07-17 PROCEDURE — 87070 CULTURE OTHR SPECIMN AEROBIC: CPT | Mod: 59 | Performed by: SURGERY

## 2023-07-17 PROCEDURE — 87205 SMEAR GRAM STAIN: CPT | Mod: 59 | Performed by: SURGERY

## 2023-07-17 PROCEDURE — 88311 PR  DECALCIFY TISSUE: ICD-10-PCS | Mod: 26,,, | Performed by: STUDENT IN AN ORGANIZED HEALTH CARE EDUCATION/TRAINING PROGRAM

## 2023-07-17 PROCEDURE — 63600175 PHARM REV CODE 636 W HCPCS: Performed by: SURGERY

## 2023-07-17 PROCEDURE — 87075 CULTR BACTERIA EXCEPT BLOOD: CPT | Mod: 59 | Performed by: SURGERY

## 2023-07-17 PROCEDURE — 88309 PR  SURG PATH,LEVEL VI: ICD-10-PCS | Mod: 26,,, | Performed by: STUDENT IN AN ORGANIZED HEALTH CARE EDUCATION/TRAINING PROGRAM

## 2023-07-17 PROCEDURE — D9220A PRA ANESTHESIA: Mod: ANES,,, | Performed by: ANESTHESIOLOGY

## 2023-07-17 PROCEDURE — 87206 SMEAR FLUORESCENT/ACID STAI: CPT | Mod: 91 | Performed by: SURGERY

## 2023-07-17 PROCEDURE — 87015 SPECIMEN INFECT AGNT CONCNTJ: CPT | Performed by: SURGERY

## 2023-07-17 PROCEDURE — 25000003 PHARM REV CODE 250: Performed by: NURSE ANESTHETIST, CERTIFIED REGISTERED

## 2023-07-17 PROCEDURE — 88309 TISSUE EXAM BY PATHOLOGIST: CPT | Mod: 26,,, | Performed by: STUDENT IN AN ORGANIZED HEALTH CARE EDUCATION/TRAINING PROGRAM

## 2023-07-17 PROCEDURE — 88311 DECALCIFY TISSUE: CPT | Performed by: STUDENT IN AN ORGANIZED HEALTH CARE EDUCATION/TRAINING PROGRAM

## 2023-07-17 PROCEDURE — D9220A PRA ANESTHESIA: ICD-10-PCS | Mod: CRNA,,, | Performed by: NURSE ANESTHETIST, CERTIFIED REGISTERED

## 2023-07-17 PROCEDURE — 37000009 HC ANESTHESIA EA ADD 15 MINS: Performed by: SURGERY

## 2023-07-17 PROCEDURE — 25000003 PHARM REV CODE 250: Performed by: SURGERY

## 2023-07-17 RX ORDER — HYDROCODONE BITARTRATE AND ACETAMINOPHEN 5; 325 MG/1; MG/1
1 TABLET ORAL EVERY 6 HOURS PRN
Qty: 24 TABLET | Refills: 0 | Status: SHIPPED | OUTPATIENT
Start: 2023-07-17

## 2023-07-17 RX ORDER — SODIUM CHLORIDE 9 MG/ML
INJECTION, SOLUTION INTRAVENOUS CONTINUOUS
Status: DISCONTINUED | OUTPATIENT
Start: 2023-07-17 | End: 2023-07-17 | Stop reason: HOSPADM

## 2023-07-17 RX ORDER — ACETAMINOPHEN 10 MG/ML
INJECTION, SOLUTION INTRAVENOUS
Status: DISCONTINUED | OUTPATIENT
Start: 2023-07-17 | End: 2023-07-17

## 2023-07-17 RX ORDER — MUPIROCIN 20 MG/G
OINTMENT TOPICAL
Status: DISCONTINUED | OUTPATIENT
Start: 2023-07-17 | End: 2023-07-17 | Stop reason: HOSPADM

## 2023-07-17 RX ORDER — VANCOMYCIN HYDROCHLORIDE 1 G/20ML
INJECTION, POWDER, LYOPHILIZED, FOR SOLUTION INTRAVENOUS
Status: DISCONTINUED | OUTPATIENT
Start: 2023-07-17 | End: 2023-07-17 | Stop reason: HOSPADM

## 2023-07-17 RX ORDER — MIDAZOLAM HYDROCHLORIDE 1 MG/ML
INJECTION, SOLUTION INTRAMUSCULAR; INTRAVENOUS
Status: DISCONTINUED | OUTPATIENT
Start: 2023-07-17 | End: 2023-07-17

## 2023-07-17 RX ORDER — FENTANYL CITRATE 50 UG/ML
INJECTION, SOLUTION INTRAMUSCULAR; INTRAVENOUS
Status: DISCONTINUED | OUTPATIENT
Start: 2023-07-17 | End: 2023-07-17

## 2023-07-17 RX ORDER — ACETAMINOPHEN 325 MG/1
650 TABLET ORAL EVERY 4 HOURS PRN
Status: DISCONTINUED | OUTPATIENT
Start: 2023-07-17 | End: 2023-07-17 | Stop reason: HOSPADM

## 2023-07-17 RX ORDER — PROPOFOL 10 MG/ML
VIAL (ML) INTRAVENOUS CONTINUOUS PRN
Status: DISCONTINUED | OUTPATIENT
Start: 2023-07-17 | End: 2023-07-17

## 2023-07-17 RX ORDER — ONDANSETRON 2 MG/ML
INJECTION INTRAMUSCULAR; INTRAVENOUS
Status: DISCONTINUED | OUTPATIENT
Start: 2023-07-17 | End: 2023-07-17

## 2023-07-17 RX ORDER — PROPOFOL 10 MG/ML
VIAL (ML) INTRAVENOUS
Status: DISCONTINUED | OUTPATIENT
Start: 2023-07-17 | End: 2023-07-17

## 2023-07-17 RX ORDER — LIDOCAINE HYDROCHLORIDE 10 MG/ML
INJECTION, SOLUTION EPIDURAL; INFILTRATION; INTRACAUDAL; PERINEURAL
Status: DISCONTINUED | OUTPATIENT
Start: 2023-07-17 | End: 2023-07-17 | Stop reason: HOSPADM

## 2023-07-17 RX ORDER — HYDROCODONE BITARTRATE AND ACETAMINOPHEN 5; 325 MG/1; MG/1
1 TABLET ORAL EVERY 4 HOURS PRN
Status: DISCONTINUED | OUTPATIENT
Start: 2023-07-17 | End: 2023-07-17 | Stop reason: HOSPADM

## 2023-07-17 RX ORDER — BUPIVACAINE HYDROCHLORIDE 2.5 MG/ML
INJECTION, SOLUTION EPIDURAL; INFILTRATION; INTRACAUDAL
Status: COMPLETED | OUTPATIENT
Start: 2023-07-17 | End: 2023-07-17

## 2023-07-17 RX ORDER — CLINDAMYCIN PHOSPHATE 900 MG/50ML
INJECTION, SOLUTION INTRAVENOUS
Status: DISCONTINUED | OUTPATIENT
Start: 2023-07-17 | End: 2023-07-17

## 2023-07-17 RX ADMIN — CLINDAMYCIN IN 5 PERCENT DEXTROSE 900 MG: 18 INJECTION, SOLUTION INTRAVENOUS at 04:07

## 2023-07-17 RX ADMIN — BUPIVACAINE HYDROCHLORIDE 25 ML: 2.5 INJECTION, SOLUTION EPIDURAL; INFILTRATION; INTRACAUDAL; PERINEURAL at 04:07

## 2023-07-17 RX ADMIN — FENTANYL CITRATE 100 MCG: 50 INJECTION, SOLUTION INTRAMUSCULAR; INTRAVENOUS at 04:07

## 2023-07-17 RX ADMIN — MIDAZOLAM 2 MG: 1 INJECTION INTRAMUSCULAR; INTRAVENOUS at 04:07

## 2023-07-17 RX ADMIN — PROPOFOL 100 MG: 10 INJECTION, EMULSION INTRAVENOUS at 04:07

## 2023-07-17 RX ADMIN — SODIUM CHLORIDE: 0.9 INJECTION, SOLUTION INTRAVENOUS at 04:07

## 2023-07-17 RX ADMIN — PROPOFOL 75 MCG/KG/MIN: 10 INJECTION, EMULSION INTRAVENOUS at 05:07

## 2023-07-17 RX ADMIN — ONDANSETRON 4 MG: 2 INJECTION, SOLUTION INTRAMUSCULAR; INTRAVENOUS at 05:07

## 2023-07-17 RX ADMIN — ACETAMINOPHEN 1000 MG: 10 INJECTION, SOLUTION INTRAVENOUS at 05:07

## 2023-07-17 NOTE — ANESTHESIA PREPROCEDURE EVALUATION
[facility]  Anesthesia Pre-Operative Evaluation         Patient Name: Al Anna  YOB: 1960  MRN: [id number]    SUBJECTIVE:     07/17/2023    Procedure(s) (LRB):  DEBRIDEMENT, WOUND left tma stump (N/A)    Al Anna is a 62 y.o. male here for Procedure(s) (LRB):  DEBRIDEMENT, WOUND left tma stump (N/A)    Drips:     Patient Active Problem List   Diagnosis    Type 2 diabetes with peripheral artery disease    Tobacco use    Lumbar spinal stenosis    Lumbar radiculopathy    Leg cramps    Insomnia    Essential hypertension    History of amputation of left great toe    History of amputation of left foot    High cholesterol    Diabetic retinopathy associated with type 2 diabetes mellitus    Type 2 diabetes with neuropathy    Arthropathy of lumbar facet joint    Atrial fibrillation and flutter    Diabetic neuropathy    Gastroesophageal reflux disease    PVD (peripheral vascular disease)    Inflammatory spondylopathy of lumbar region    Uncomplicated opioid dependence    Proliferative diabetic retinopathy of both eyes with macular edema associated with type 2 diabetes mellitus    Stage 3 chronic kidney disease    Wound of left foot    Wound, surgical, infected    Diabetes mellitus    Non-healing wound of amputation stump       Review of patient's allergies indicates:   Allergen Reactions    Penicillins Rash       No current facility-administered medications on file prior to encounter.     Current Outpatient Medications on File Prior to Encounter   Medication Sig Dispense Refill    acetaminophen (TYLENOL) 500 MG tablet Take 1,000 mg by mouth every 6 (six) hours as needed for Pain.      doxycycline (VIBRA-TABS) 100 MG tablet Take 1 tablet (100 mg total) by mouth 2 (two) times daily. 60 tablet 1    doxycycline (VIBRAMYCIN) 100 MG Cap Take 1 capsule (100 mg total) by mouth 2 (two) times daily. 60 capsule 1    FARXIGA 10 mg tablet TAKE 1 TABLET BY MOUTH ONCE  DAILY FOR DIABETES 90 tablet 0    gabapentin (NEURONTIN) 100 MG capsule TAKE 2 CAPSULES BY MOUTH ONCE DAILY AT BEDTIME AS NEEDED FOR 90 DAYS 180 capsule 3    gentamicin (GARAMYCIN) 0.1 % ointment Apply topically 3 (three) times daily. 30 g 1    HYDROcodone-acetaminophen (NORCO) 5-325 mg per tablet Take 1 tablet by mouth every 6 (six) hours as needed for Pain. (Patient not taking: Reported on 7/13/2023) 20 tablet 0    HYDROcodone-acetaminophen (NORCO) 5-325 mg per tablet Take 1 tablet by mouth every 6 (six) hours as needed for Pain. (Patient not taking: Reported on 7/13/2023) 24 tablet 0    ibuprofen (ADVIL,MOTRIN) 800 MG tablet Take 800 mg by mouth 3 (three) times daily.      levETIRAcetam (KEPPRA) 750 MG Tab Take 750 mg by mouth 2 (two) times daily.      lisinopriL-hydrochlorothiazide (PRINZIDE,ZESTORETIC) 20-25 mg Tab Take 1 tablet by mouth once daily 90 tablet 0    meclizine (ANTIVERT) 25 mg tablet Take 1 tablet (25 mg total) by mouth 3 (three) times daily as needed for Dizziness. 30 tablet 1    metFORMIN (FORTAMET) 500 mg 24hr tablet Take 2 tablets (1,000 mg total) by mouth 2 (two) times a day. 180 tablet 3    metFORMIN (GLUCOPHAGE-XR) 500 MG ER 24hr tablet Take 2 tablets by mouth twice daily 360 tablet 3    mupirocin (BACTROBAN) 2 % ointment Apply topically 3 (three) times daily. 22 g 2    mupirocin (BACTROBAN) 2 % ointment Apply topically 3 (three) times daily. Apply locally every other day 30 g 2    ondansetron (ZOFRAN-ODT) 4 MG TbDL DISSOLVE 1 TABLET IN MOUTH EVERY 6 HOURS AS NEEDED FOR NAUSEA 30 tablet 0    pioglitazone (ACTOS) 45 MG tablet Take 1 tablet by mouth once daily 90 tablet 3    rosuvastatin (CRESTOR) 5 MG tablet Take 1 tablet by mouth once daily 90 tablet 3    semaglutide (OZEMPIC) 1 mg/dose (4 mg/3 mL) INJECT 1 MG SUBCUTANEOUSLY ONCE EVERY 7 DAYS 3 pen 11    traMADoL (ULTRAM) 50 mg tablet TAKE 1 TABLET(50 MG) BY MOUTH THREE TIMES DAILY AS NEEDED FOR PAIN 60 tablet 5     triamcinolone acetonide 0.1% (KENALOG) 0.1 % paste SMARTSIG:Patch(s) Topical 4 Times Daily         Past Surgical History:   Procedure Laterality Date    DEBRIDEMENT OF ULCER WITH APPLICATION OF SKIN GRAFT Left 8/26/2022    Procedure: DEBRIDEMENT, ULCER, WITH SKIN GRAFT APPLICATION;  Surgeon: Dilip Horner MD;  Location: [facility] OR;  Service: General;  Laterality: Left;    FOOT AMPUTATION THROUGH METATARSAL Left 04/28/2020    WOUND DEBRIDEMENT Left 05/23/2022    Procedure: DEBRIDEMENT, WOUND;  Surgeon: Dilip Horner MD;  Location: [facility] OR;  Service: General;  Laterality: Left;       Social History     Socioeconomic History    Marital status:    Tobacco Use    Smoking status: Every Day     Packs/day: 0.50     Types: Cigarettes     Start date: 10/28/1979    Smokeless tobacco: Never   Substance and Sexual Activity    Alcohol use: Yes     Alcohol/week: 4.0 standard drinks     Types: 4 Cans of beer per week    Drug use: No         OBJECTIVE:     Vital Signs Range (Last 24H):       Significant Labs:  Lab Results   Component Value Date    WBC 7.23 05/16/2023    HGB 13.9 (L) 05/16/2023    HCT 43.5 05/16/2023     05/16/2023    CHOL 170 05/16/2023    CHOL 170 05/16/2023    TRIG 117 05/16/2023    TRIG 117 05/16/2023    HDL 43 05/16/2023    HDL 43 05/16/2023    ALT 12 05/16/2023    ALT 12 05/16/2023    AST 13 05/16/2023    AST 13 05/16/2023     05/16/2023     05/16/2023    K 5.2 (H) 05/16/2023    K 5.2 (H) 05/16/2023     05/16/2023     05/16/2023    CREATININE 1.9 (H) 05/16/2023    CREATININE 1.9 (H) 05/16/2023    BUN 38 (H) 05/16/2023    BUN 38 (H) 05/16/2023    CO2 21 (L) 05/16/2023    CO2 21 (L) 05/16/2023    TSH 1.879 02/01/2022    INR 0.9 08/19/2013    HGBA1C 9.1 (H) 05/16/2023    HGBA1C 9.1 (H) 05/16/2023       Diagnostic Studies:    EKG:   Results for orders placed or performed during the hospital encounter of 06/25/21   EKG 12-lead    Collection Time: 06/25/21  11:20 AM    Narrative    Test Reason : R42,    Vent. Rate : 070 BPM     Atrial Rate : 070 BPM     P-R Int : 174 ms          QRS Dur : 104 ms      QT Int : 366 ms       P-R-T Axes : 055 066 -03 degrees     QTc Int : 395 ms    Normal sinus rhythm  Nonspecific ST and T wave abnormality  Abnormal ECG  When compared with ECG of 19-AUG-2013 05:43,  No significant change was found  Confirmed by Cornelia Lawrence MD ([id number]) on 6/25/2021 7:00:46 PM    Referred By: ROSIE   SELF           Confirmed By:Cornelia Lawrence MD           Pre-op Assessment    I have reviewed the Patient Summary Reports.     I have reviewed the Nursing Notes. I have reviewed the NPO Status.   I have reviewed the Medications.     Review of Systems  Anesthesia Hx:  No problems with previous Anesthesia  History of prior surgery of interest to airway management or planning:  Denies Personal Hx of Anesthesia complications.   Social:  Smoker    Cardiovascular:   Hypertension Past MI CAD   PVD    Renal/:   Chronic Renal Disease, CKD    Hepatic/GI:   GERD    Neurological:   Neuromuscular Disease,    Endocrine:   Diabetes           Anesthesia Plan  Type of Anesthesia, risks & benefits discussed:    Anesthesia Type: Gen Natural Airway, Regional  Intra-op Monitoring Plan: Standard ASA Monitors  Post Op Pain Control Plan: multimodal analgesia, IV/PO Opioids PRN and peripheral nerve block  Induction:  IV  Informed Consent: Informed consent signed with the Patient and all parties understand the risks and agree with anesthesia plan.  All questions answered.   ASA Score: 3  Day of Surgery Review of History & Physical: H&P Update referred to the surgeon/provider.    Ready For Surgery From Anesthesia Perspective.     .

## 2023-07-17 NOTE — H&P
History & Physical    SUBJECTIVE:     History of Present Illness:  Patient is a 62 y.o. male presents for the wound check for non healing Left foot TMA stump. C/o severe pain and discharge from the wound on left TMA stump  . No other active complaints.     No chief complaint on file.      Review of patient's allergies indicates:   Allergen Reactions    Penicillins Rash       No current facility-administered medications for this encounter.     Current Outpatient Medications   Medication Sig Dispense Refill    acetaminophen (TYLENOL) 500 MG tablet Take 1,000 mg by mouth every 6 (six) hours as needed for Pain.      doxycycline (VIBRA-TABS) 100 MG tablet Take 1 tablet (100 mg total) by mouth 2 (two) times daily. 60 tablet 1    doxycycline (VIBRAMYCIN) 100 MG Cap Take 1 capsule (100 mg total) by mouth 2 (two) times daily. 60 capsule 1    FARXIGA 10 mg tablet TAKE 1 TABLET BY MOUTH ONCE DAILY FOR DIABETES 90 tablet 0    gabapentin (NEURONTIN) 100 MG capsule TAKE 2 CAPSULES BY MOUTH ONCE DAILY AT BEDTIME AS NEEDED FOR 90 DAYS 180 capsule 3    gentamicin (GARAMYCIN) 0.1 % ointment Apply topically 3 (three) times daily. 30 g 1    HYDROcodone-acetaminophen (NORCO) 5-325 mg per tablet Take 1 tablet by mouth every 6 (six) hours as needed for Pain. (Patient not taking: Reported on 7/13/2023) 20 tablet 0    HYDROcodone-acetaminophen (NORCO) 5-325 mg per tablet Take 1 tablet by mouth every 6 (six) hours as needed for Pain. (Patient not taking: Reported on 7/13/2023) 24 tablet 0    ibuprofen (ADVIL,MOTRIN) 800 MG tablet Take 800 mg by mouth 3 (three) times daily.      levETIRAcetam (KEPPRA) 750 MG Tab Take 750 mg by mouth 2 (two) times daily.      lisinopriL-hydrochlorothiazide (PRINZIDE,ZESTORETIC) 20-25 mg Tab Take 1 tablet by mouth once daily 90 tablet 0    meclizine (ANTIVERT) 25 mg tablet Take 1 tablet (25 mg total) by mouth 3 (three) times daily as needed for Dizziness. 30 tablet 1    metFORMIN (FORTAMET) 500 mg 24hr tablet  Take 2 tablets (1,000 mg total) by mouth 2 (two) times a day. 180 tablet 3    metFORMIN (GLUCOPHAGE-XR) 500 MG ER 24hr tablet Take 2 tablets by mouth twice daily 360 tablet 3    mupirocin (BACTROBAN) 2 % ointment Apply topically 3 (three) times daily. 22 g 2    mupirocin (BACTROBAN) 2 % ointment Apply topically 3 (three) times daily. Apply locally every other day 30 g 2    ondansetron (ZOFRAN-ODT) 4 MG TbDL DISSOLVE 1 TABLET IN MOUTH EVERY 6 HOURS AS NEEDED FOR NAUSEA 30 tablet 0    pioglitazone (ACTOS) 45 MG tablet Take 1 tablet by mouth once daily 90 tablet 3    rosuvastatin (CRESTOR) 5 MG tablet Take 1 tablet by mouth once daily 90 tablet 3    semaglutide (OZEMPIC) 1 mg/dose (4 mg/3 mL) INJECT 1 MG SUBCUTANEOUSLY ONCE EVERY 7 DAYS 3 pen 11    traMADoL (ULTRAM) 50 mg tablet TAKE 1 TABLET(50 MG) BY MOUTH THREE TIMES DAILY AS NEEDED FOR PAIN 60 tablet 5    triamcinolone acetonide 0.1% (KENALOG) 0.1 % paste SMARTSIG:Patch(s) Topical 4 Times Daily         Past Medical History:   Diagnosis Date    Coronary artery disease     MI (myocardial infarction)    COVID-19 6/16/2020    Diabetes mellitus     Neuropathy - Retinopathy - PAD    Glaucoma     High cholesterol     Hypertension      Past Surgical History:   Procedure Laterality Date    DEBRIDEMENT OF ULCER WITH APPLICATION OF SKIN GRAFT Left 8/26/2022    Procedure: DEBRIDEMENT, ULCER, WITH SKIN GRAFT APPLICATION;  Surgeon: Dilip Horner MD;  Location: [facility] OR;  Service: General;  Laterality: Left;    FOOT AMPUTATION THROUGH METATARSAL Left 04/28/2020    WOUND DEBRIDEMENT Left 05/23/2022    Procedure: DEBRIDEMENT, WOUND;  Surgeon: Dilip Horner MD;  Location: [facility] OR;  Service: General;  Laterality: Left;     Family History   Problem Relation Age of Onset    Heart disease Father      Social History     Tobacco Use    Smoking status: Every Day     Packs/day: 0.50     Types: Cigarettes     Start date: 10/28/1979    Smokeless tobacco: Never   Substance Use  Topics    Alcohol use: Yes     Alcohol/week: 4.0 standard drinks     Types: 4 Cans of beer per week    Drug use: No        Review of Systems:    Review of Systems   Constitutional: Negative.    HENT: Negative.     Eyes: Negative.    Respiratory: Negative.     Cardiovascular: Negative.    Gastrointestinal: Negative.    Genitourinary: Negative.    Musculoskeletal: Negative.    Neurological: Negative.    Psychiatric/Behavioral: Negative.       OBJECTIVE:     Vital Signs (Most Recent)              Physical Exam:    Physical Exam  Constitutional:       Appearance: He is well-developed.   HENT:      Head: Normocephalic.   Eyes:      Conjunctiva/sclera: Conjunctivae normal.      Pupils: Pupils are equal, round, and reactive to light.   Cardiovascular:      Rate and Rhythm: Normal rate and regular rhythm.      Heart sounds: Normal heart sounds.   Pulmonary:      Effort: Pulmonary effort is normal.      Breath sounds: Normal breath sounds.   Abdominal:      General: Bowel sounds are normal.      Palpations: Abdomen is soft.   Musculoskeletal:         General: Normal range of motion.      Cervical back: Normal range of motion and neck supple.        Legs:       Left Lower Extremity: Left leg is amputated below ankle.   Skin:     General: Skin is warm and dry.   Neurological:      Mental Status: He is alert and oriented to person, place, and time.      Deep Tendon Reflexes: Reflexes are normal and symmetric.       Laboratory      Diagnostic Results:      ASSESSMENT/PLAN:   Diabetes mellitus   Hypertension   Wound left foot   S/p TMA left foot   PVD    PLAN:Plan   Patient scheduled for the xray left foot and arterial doppler us of lower limbs .   To schedule for the wound debridement on Left TMA stump to be done to day.

## 2023-07-17 NOTE — OP NOTE
[place] - Surgery (Hospital)  Operative Note      Date of Procedure: 7/17/2023     Procedure: Procedure(s) (LRB):  DEBRIDEMENT, WOUND left tma stump (N/A)   Revision of TMA stump  Surgeon(s) and Role:     * Dilip Horner MD - Primary    Assisting Surgeon: None    Pre-Operative Diagnosis: PVD (peripheral vascular disease) [I73.9]  Type 2 diabetes mellitus with diabetic neuropathy, with long-term current use of insulin [E11.40, Z79.4]  Essential hypertension [I10]  Wound of left foot [S91.302A]  Osteomyelitis of left foot, unspecified type [M86.9]    Post-Operative Diagnosis: Post-Op Diagnosis Codes:     * PVD (peripheral vascular disease) [I73.9]     * Type 2 diabetes mellitus with diabetic neuropathy, with long-term current use of insulin [E11.40, Z79.4]     * Essential hypertension [I10]     * Wound of left foot [S91.302A]     * Osteomyelitis of left foot, unspecified type [M86.9]    Anesthesia: General    Operative Findings (including complications, if any):  Excision debridement left foot TMA stump with revision of the stump after ankle block patient tolerated well no intraop complication sent to recovery room in stable condition..    Description of Technical Procedures:  After satisfactory IV sedation patient supine position time-out was called patient identity confirmed site was confirmed left leg left foot was prepped and draped in a sterile manner using Betadine scrub solution stockinette was applied area of the infected wound was infiltrated using 1% xylocaine solution showed anterior long posterior flap incision was made excision of the infected skin subcutaneous tissue muscle fascia was was then up to freshly bleeding tissue exposed metatarsal wounds of 2nd 3rd and 4th toe were incised with the help of bone cutter and rongeur exposed area was trimmed with a rasp wound was cauterized using electrocautery all bleeders were clamped bovied hemostasis was maintained excised bone was sent for pathology and  culture hemostasis was maintained wound was approximated using interrupted 2-0 nylon suture wound was then packed using half-inch packing Xeroform 1/4 inch iodoform packing sterile gauze dressing was applied instrument counts sponge count counts correct patient tolerated well no intraop complication estimated blood loss 50 cc specimen removed skin subcutaneous tissue muscle fascia and bone patient was sent to recovery room in stable condition.    Significant Surgical Tasks Conducted by the Assistant(s), if Applicable: na    Estimated Blood Loss (EBL): 50 cc           Implants: * No implants in log *    Specimens:   Specimen (24h ago, onward)      None                    Condition: Good    Disposition: PACU - hemodynamically stable.    Attestation: I performed the procedure.    Discharge Note    OUTCOME: Patient tolerated treatment/procedure well without complication and is now ready for discharge.    DISPOSITION: Home or Self Care    FINAL DIAGNOSIS:  Wound of left foot    FOLLOWUP: In clinic   3 days  DISCHARGE INSTRUCTIONS:    Discharge Procedure Orders   Diet general     Keep surgical extremity elevated     Lifting restrictions     Leave dressing on - Keep it clean, dry, and intact until clinic visit     Call MD for:  temperature >100.4     Call MD for:  persistent nausea and vomiting     Call MD for:  severe uncontrolled pain     Call MD for:  redness, tenderness, or signs of infection (pain, swelling, redness, odor or green/yellow discharge around incision site)

## 2023-07-17 NOTE — DISCHARGE INSTRUCTIONS
DRESSING CARE AND ACTIVITY  -KEEP DRESSING CLEAN,DRY, AND INTACT UNTIL FOLLOW UP  -IF DRESSING BECOMES SOILED CONTACT HIS OFFICE TO GET IT REDRESSED  -TAKE PAIN MEDS AS NEEDED  -KEEP FOOT ELEVATED AND AMBULATE AS LITTLE AS POSSIBLE  ANESTHESIA  -For the first 24 hours after surgery:  Do not drive, use heavy equipment, make important decisions, or drink alcohol  -It is normal to feel sleepy for several hours.  Rest until you are more awake.  -Have someone stay with you, if needed.  They can watch for problems and help keep you safe.  -Some possible post anesthesia side effects include: nausea and vomiting, sore throat and hoarseness, sleepiness, and dizziness.    PAIN  -If you have pain after surgery, pain medicine will help you feel better.  Take it as directed, before pain becomes severe.  Most pain relievers taken by mouth need at least 20-30 minutes to start working.  -Do not drive or drink alcohol while taking pain medicine.  -Pain medication can upset your stomach.  Taking them with a little food may help.  -Other ways to help control pain: elevation, ice, and relaxation  -Call your surgeon if still having unmanageable pain an hour after taking pain medicine.  -Pain medicine can cause constipation.  Taking an over-the counter stool softener while on prescription pain medicine and drinking plenty of fluids can prevent this side effect.  -Call your surgeon if you have severe side effects like: breathing problems, trouble waking up, dizziness, confusion, or severe constipation.    NAUSEA  -Some people have nausea after surgery.  This is often because of anesthesia, pain, pain medicine, or the stress of surgery.  -Do not push yourself to eat.  Start off with clear liquids and soup.  Slowly move to solid foods.  Don't eat fatty, rich, spicy foods at first.  Eat smaller amounts.  -If you develop persistent nausea and vomiting please notify your surgeon immediately.    BLEEDING  -Different types of surgery require  different types of care and dressing changes.  It is important to follow all instructions and advice from your surgeon.  Change dressing as directed.  Call your surgeon for any concerns regarding postop bleeding.    SIGNS OF INFECTION  -Signs of infection include: fever, swelling, drainage, and redness  -Notify your surgeon if you have a fever of 100.4 F (38.0 C) or higher.  -Notify your surgeon if you notice redness, swelling, increased pain, pus, or a foul smell at the incision site.

## 2023-07-17 NOTE — ANESTHESIA PROCEDURE NOTES
Peripheral Block    Patient location during procedure: pre-op   Block not for primary anesthetic.  Reason for block: at surgeon's request and post-op pain management   Post-op Pain Location: Left Foot Pain   Start time: 7/17/2023 4:10 PM  Timeout: 7/17/2023 4:09 PM   End time: 7/17/2023 4:12 PM    Staffing  Authorizing Provider: Bunny Hinds MD  Performing Provider: Bunny Hinds MD    Preanesthetic Checklist  Completed: patient identified, IV checked, site marked, risks and benefits discussed, surgical consent, monitors and equipment checked, pre-op evaluation and timeout performed  Peripheral Block  Patient position: supine  Prep: ChloraPrep  Patient monitoring: heart rate, cardiac monitor, continuous pulse ox, continuous capnometry and frequent blood pressure checks  Block type: popliteal  Laterality: left  Injection technique: single shot  Needle  Needle type: Stimuplex   Needle gauge: 21 G  Needle length: 4 in  Needle localization: anatomical landmarks and ultrasound guidance   -ultrasound image captured on disc.  Assessment  Injection assessment: negative aspiration, negative parasthesia and local visualized surrounding nerve  Paresthesia pain: none  Heart rate change: no  Slow fractionated injection: yes    Medications:    Medications: bupivacaine (pf) (MARCAINE) injection 0.25% - Perineural   25 mL - 7/17/2023 4:11:00 PM    Additional Notes  VSS.  [facility] RN monitoring vitals throughout procedure.  Patient tolerated procedure well.

## 2023-07-17 NOTE — TRANSFER OF CARE
"Anesthesia Transfer of Care Note    Patient: Al Anna    Procedure(s) Performed: Procedure(s) (LRB):  DEBRIDEMENT, WOUND left tma stump (N/A)    Patient location: OPS    Anesthesia Type: general    Transport from OR: Transported from OR on room air with adequate spontaneous ventilation    Post pain: adequate analgesia    Post assessment: no apparent anesthetic complications and tolerated procedure well    Post vital signs: stable    Level of consciousness: awake, oriented and alert    Nausea/Vomiting: no nausea/vomiting    Complications: none    Transfer of care protocol was followed      Last vitals:   Visit Vitals  /62 (BP Location: Right arm, Patient Position: Lying)   Pulse 70   Temp 36.7 °C (98.1 °F) (Skin)   Resp 18   Ht 6' 4" (1.93 m)   Wt 116.6 kg (257 lb)   SpO2 97%   BMI 31.28 kg/m²     "

## 2023-07-18 LAB
GRAM STN SPEC: NORMAL

## 2023-07-18 NOTE — ANESTHESIA POSTPROCEDURE EVALUATION
Anesthesia Post Evaluation    Patient: Al Anna    Procedure(s) Performed: Procedure(s) (LRB):  DEBRIDEMENT, WOUND left tma stump (Left)  REVISION, AMPUTATION, LEFT TMA STUMP (Left)    Final Anesthesia Type: general      Patient location during evaluation: PACU  Patient participation: Yes- Able to Participate  Level of consciousness: awake and alert  Post-procedure vital signs: reviewed and stable  Pain management: adequate  Airway patency: patent    PONV status at discharge: No PONV  Anesthetic complications: no      Cardiovascular status: blood pressure returned to baseline  Respiratory status: unassisted  Hydration status: euvolemic            Vitals Value Taken Time   /59 07/17/23 1845   Temp 36.6 °C (97.8 °F) 07/17/23 1845   Pulse 63 07/17/23 1845   Resp 19 07/17/23 1845   SpO2 95 % 07/17/23 1845         No case tracking events are documented in the log.      Pain/Darby Score: Darby Score: 10 (7/17/2023  6:45 PM)

## 2023-07-18 NOTE — PLAN OF CARE
Some oozing noted, dressing reinforced, family a nurse and ok with findings, supplies given, discharged in WC, vss, all instructions given

## 2023-07-20 LAB — BACTERIA SPEC AEROBE CULT: ABNORMAL

## 2023-07-21 LAB
BACTERIA SPEC AEROBE CULT: NO GROWTH
BACTERIA SPEC ANAEROBE CULT: NORMAL

## 2023-07-23 LAB
FINAL PATHOLOGIC DIAGNOSIS: NORMAL
GROSS: NORMAL
Lab: NORMAL
MICROSCOPIC EXAM: NORMAL

## 2023-07-24 LAB — BACTERIA SPEC ANAEROBE CULT: ABNORMAL

## 2023-08-07 ENCOUNTER — LAB VISIT (OUTPATIENT)
Dept: LAB | Facility: HOSPITAL | Age: 63
End: 2023-08-07
Attending: FAMILY MEDICINE
Payer: MEDICARE

## 2023-08-07 DIAGNOSIS — E11.65 TYPE 2 DIABETES MELLITUS WITH HYPERGLYCEMIA, WITHOUT LONG-TERM CURRENT USE OF INSULIN: ICD-10-CM

## 2023-08-07 LAB
ANION GAP SERPL CALC-SCNC: 12 MMOL/L (ref 8–16)
BUN SERPL-MCNC: 33 MG/DL (ref 8–23)
CALCIUM SERPL-MCNC: 9.7 MG/DL (ref 8.7–10.5)
CHLORIDE SERPL-SCNC: 106 MMOL/L (ref 95–110)
CO2 SERPL-SCNC: 21 MMOL/L (ref 23–29)
CREAT SERPL-MCNC: 2 MG/DL (ref 0.5–1.4)
EST. GFR  (NO RACE VARIABLE): 37 ML/MIN/1.73 M^2
ESTIMATED AVG GLUCOSE: 223 MG/DL (ref 68–131)
GLUCOSE SERPL-MCNC: 174 MG/DL (ref 70–110)
HBA1C MFR BLD: 9.4 % (ref 4–5.6)
POTASSIUM SERPL-SCNC: 4.1 MMOL/L (ref 3.5–5.1)
SODIUM SERPL-SCNC: 139 MMOL/L (ref 136–145)

## 2023-08-07 PROCEDURE — 83036 HEMOGLOBIN GLYCOSYLATED A1C: CPT | Performed by: FAMILY MEDICINE

## 2023-08-07 PROCEDURE — 36415 COLL VENOUS BLD VENIPUNCTURE: CPT | Performed by: FAMILY MEDICINE

## 2023-08-07 PROCEDURE — 80048 BASIC METABOLIC PNL TOTAL CA: CPT | Performed by: FAMILY MEDICINE

## 2023-08-16 LAB
FUNGUS SPEC CULT: ABNORMAL
FUNGUS SPEC CULT: NORMAL

## 2023-09-05 LAB
ACID FAST MOD KINY STN SPEC: NORMAL
ACID FAST MOD KINY STN SPEC: NORMAL
MYCOBACTERIUM SPEC QL CULT: NORMAL
MYCOBACTERIUM SPEC QL CULT: NORMAL

## 2023-11-07 ENCOUNTER — LAB VISIT (OUTPATIENT)
Dept: LAB | Facility: HOSPITAL | Age: 63
End: 2023-11-07
Attending: FAMILY MEDICINE
Payer: MEDICAID

## 2023-11-07 DIAGNOSIS — E11.51 TYPE 2 DIABETES MELLITUS WITH PERIPHERAL ANGIOPATHY: Chronic | ICD-10-CM

## 2023-11-07 LAB
ANION GAP SERPL CALC-SCNC: 16 MMOL/L (ref 8–16)
BUN SERPL-MCNC: 20 MG/DL (ref 8–23)
CALCIUM SERPL-MCNC: 9.3 MG/DL (ref 8.7–10.5)
CHLORIDE SERPL-SCNC: 106 MMOL/L (ref 95–110)
CO2 SERPL-SCNC: 18 MMOL/L (ref 23–29)
CREAT SERPL-MCNC: 1.5 MG/DL (ref 0.5–1.4)
EST. GFR  (NO RACE VARIABLE): 52 ML/MIN/1.73 M^2
ESTIMATED AVG GLUCOSE: 186 MG/DL (ref 68–131)
GLUCOSE SERPL-MCNC: 125 MG/DL (ref 70–110)
HBA1C MFR BLD: 8.1 % (ref 4–5.6)
POTASSIUM SERPL-SCNC: 4 MMOL/L (ref 3.5–5.1)
SODIUM SERPL-SCNC: 140 MMOL/L (ref 136–145)

## 2023-11-07 PROCEDURE — 80048 BASIC METABOLIC PNL TOTAL CA: CPT | Performed by: FAMILY MEDICINE

## 2023-11-07 PROCEDURE — 83036 HEMOGLOBIN GLYCOSYLATED A1C: CPT | Performed by: FAMILY MEDICINE

## 2023-11-07 PROCEDURE — 36415 COLL VENOUS BLD VENIPUNCTURE: CPT | Performed by: FAMILY MEDICINE

## 2023-11-20 PROBLEM — E11.9 DIABETES MELLITUS: Status: RESOLVED | Noted: 2022-08-11 | Resolved: 2023-11-20

## 2023-11-20 PROBLEM — E11.9 TYPE 2 DIABETES MELLITUS: Status: ACTIVE | Noted: 2023-11-20

## 2023-11-20 PROBLEM — Z89.412 HISTORY OF AMPUTATION OF LEFT GREAT TOE: Chronic | Status: ACTIVE | Noted: 2020-06-16

## 2024-02-08 ENCOUNTER — LAB VISIT (OUTPATIENT)
Dept: LAB | Facility: HOSPITAL | Age: 64
End: 2024-02-08
Attending: FAMILY MEDICINE
Payer: MEDICARE

## 2024-02-08 DIAGNOSIS — E11.51 TYPE 2 DIABETES MELLITUS WITH PERIPHERAL ANGIOPATHY: Chronic | ICD-10-CM

## 2024-02-08 LAB
ANION GAP SERPL CALC-SCNC: 12 MMOL/L (ref 8–16)
BUN SERPL-MCNC: 26 MG/DL (ref 8–23)
CALCIUM SERPL-MCNC: 9.3 MG/DL (ref 8.7–10.5)
CHLORIDE SERPL-SCNC: 106 MMOL/L (ref 95–110)
CO2 SERPL-SCNC: 23 MMOL/L (ref 23–29)
CREAT SERPL-MCNC: 1.7 MG/DL (ref 0.5–1.4)
EST. GFR  (NO RACE VARIABLE): 45 ML/MIN/1.73 M^2
ESTIMATED AVG GLUCOSE: 183 MG/DL (ref 68–131)
GLUCOSE SERPL-MCNC: 186 MG/DL (ref 70–110)
HBA1C MFR BLD: 8 % (ref 4–5.6)
POTASSIUM SERPL-SCNC: 4.3 MMOL/L (ref 3.5–5.1)
SODIUM SERPL-SCNC: 141 MMOL/L (ref 136–145)

## 2024-02-08 PROCEDURE — 36415 COLL VENOUS BLD VENIPUNCTURE: CPT | Performed by: FAMILY MEDICINE

## 2024-02-08 PROCEDURE — 80048 BASIC METABOLIC PNL TOTAL CA: CPT | Performed by: FAMILY MEDICINE

## 2024-02-08 PROCEDURE — 82043 UR ALBUMIN QUANTITATIVE: CPT | Performed by: FAMILY MEDICINE

## 2024-02-08 PROCEDURE — 83036 HEMOGLOBIN GLYCOSYLATED A1C: CPT | Performed by: FAMILY MEDICINE

## 2024-02-09 LAB
ALBUMIN/CREAT UR: 31.3 UG/MG (ref 0–30)
CREAT UR-MCNC: 112 MG/DL (ref 23–375)
MICROALBUMIN UR DL<=1MG/L-MCNC: 35 UG/ML

## 2024-03-05 PROBLEM — M86.672 OTHER CHRONIC OSTEOMYELITIS, LEFT ANKLE AND FOOT: Status: ACTIVE | Noted: 2024-03-05

## 2024-04-01 ENCOUNTER — HOSPITAL ENCOUNTER (OUTPATIENT)
Dept: RADIOLOGY | Facility: HOSPITAL | Age: 64
Discharge: HOME | End: 2024-04-01
Attending: SURGERY
Payer: MEDICARE

## 2024-04-01 DIAGNOSIS — E11.51 TYPE 2 DIABETES MELLITUS WITH PERIPHERAL ANGIOPATHY: Chronic | ICD-10-CM

## 2024-04-01 DIAGNOSIS — E66.01 MORBID OBESITY: Chronic | ICD-10-CM

## 2024-04-01 DIAGNOSIS — I10 ESSENTIAL HYPERTENSION: Chronic | ICD-10-CM

## 2024-04-01 DIAGNOSIS — I73.9 PVD (PERIPHERAL VASCULAR DISEASE): ICD-10-CM

## 2024-04-01 DIAGNOSIS — T81.49XA WOUND, SURGICAL, INFECTED: ICD-10-CM

## 2024-04-01 PROCEDURE — 93926 LOWER EXTREMITY STUDY: CPT | Mod: 26,LT,, | Performed by: RADIOLOGY

## 2024-04-01 PROCEDURE — 93926 LOWER EXTREMITY STUDY: CPT | Mod: TC,LT

## 2024-09-03 ENCOUNTER — LAB VISIT (OUTPATIENT)
Dept: LAB | Facility: HOSPITAL | Age: 64
End: 2024-09-03
Attending: FAMILY MEDICINE
Payer: MEDICARE

## 2024-09-03 DIAGNOSIS — E78.00 HIGH CHOLESTEROL: ICD-10-CM

## 2024-09-03 DIAGNOSIS — N18.31 TYPE 2 DIABETES MELLITUS WITH STAGE 3A CHRONIC KIDNEY DISEASE, WITHOUT LONG-TERM CURRENT USE OF INSULIN: ICD-10-CM

## 2024-09-03 DIAGNOSIS — I10 ESSENTIAL HYPERTENSION: Chronic | ICD-10-CM

## 2024-09-03 DIAGNOSIS — E11.22 TYPE 2 DIABETES MELLITUS WITH STAGE 3A CHRONIC KIDNEY DISEASE, WITHOUT LONG-TERM CURRENT USE OF INSULIN: ICD-10-CM

## 2024-09-03 LAB
ALBUMIN SERPL BCP-MCNC: 3.3 G/DL (ref 3.5–5.2)
ALP SERPL-CCNC: 89 U/L (ref 55–135)
ALT SERPL W/O P-5'-P-CCNC: 7 U/L (ref 10–44)
ANION GAP SERPL CALC-SCNC: 11 MMOL/L (ref 8–16)
AST SERPL-CCNC: 13 U/L (ref 10–40)
BASOPHILS # BLD AUTO: 0.08 K/UL (ref 0–0.2)
BASOPHILS NFR BLD: 0.8 % (ref 0–1.9)
BILIRUB SERPL-MCNC: 0.4 MG/DL (ref 0.1–1)
BUN SERPL-MCNC: 27 MG/DL (ref 8–23)
CALCIUM SERPL-MCNC: 10 MG/DL (ref 8.7–10.5)
CHLORIDE SERPL-SCNC: 104 MMOL/L (ref 95–110)
CHOLEST SERPL-MCNC: 150 MG/DL (ref 120–199)
CHOLEST/HDLC SERPL: 3.4 {RATIO} (ref 2–5)
CO2 SERPL-SCNC: 23 MMOL/L (ref 23–29)
CREAT SERPL-MCNC: 1.9 MG/DL (ref 0.5–1.4)
DIFFERENTIAL METHOD BLD: ABNORMAL
EOSINOPHIL # BLD AUTO: 0.3 K/UL (ref 0–0.5)
EOSINOPHIL NFR BLD: 2.9 % (ref 0–8)
ERYTHROCYTE [DISTWIDTH] IN BLOOD BY AUTOMATED COUNT: 14.8 % (ref 11.5–14.5)
EST. GFR  (NO RACE VARIABLE): 39 ML/MIN/1.73 M^2
ESTIMATED AVG GLUCOSE: 260 MG/DL (ref 68–131)
GLUCOSE SERPL-MCNC: 217 MG/DL (ref 70–110)
HBA1C MFR BLD: 10.7 % (ref 4–5.6)
HCT VFR BLD AUTO: 43 % (ref 40–54)
HDLC SERPL-MCNC: 44 MG/DL (ref 40–75)
HDLC SERPL: 29.3 % (ref 20–50)
HGB BLD-MCNC: 14 G/DL (ref 14–18)
IMM GRANULOCYTES # BLD AUTO: 0.08 K/UL (ref 0–0.04)
IMM GRANULOCYTES NFR BLD AUTO: 0.8 % (ref 0–0.5)
LDLC SERPL CALC-MCNC: 87 MG/DL (ref 63–159)
LYMPHOCYTES # BLD AUTO: 4 K/UL (ref 1–4.8)
LYMPHOCYTES NFR BLD: 41.7 % (ref 18–48)
MCH RBC QN AUTO: 28 PG (ref 27–31)
MCHC RBC AUTO-ENTMCNC: 32.6 G/DL (ref 32–36)
MCV RBC AUTO: 86 FL (ref 82–98)
MONOCYTES # BLD AUTO: 0.8 K/UL (ref 0.3–1)
MONOCYTES NFR BLD: 8.3 % (ref 4–15)
NEUTROPHILS # BLD AUTO: 4.4 K/UL (ref 1.8–7.7)
NEUTROPHILS NFR BLD: 45.5 % (ref 38–73)
NONHDLC SERPL-MCNC: 106 MG/DL
NRBC BLD-RTO: 0 /100 WBC
PLATELET # BLD AUTO: 258 K/UL (ref 150–450)
PMV BLD AUTO: 10.7 FL (ref 9.2–12.9)
POTASSIUM SERPL-SCNC: 4.5 MMOL/L (ref 3.5–5.1)
PROT SERPL-MCNC: 7.8 G/DL (ref 6–8.4)
PTH-INTACT SERPL-MCNC: 56.6 PG/ML (ref 9–77)
RBC # BLD AUTO: 5 M/UL (ref 4.6–6.2)
SODIUM SERPL-SCNC: 138 MMOL/L (ref 136–145)
TRIGL SERPL-MCNC: 95 MG/DL (ref 30–150)
WBC # BLD AUTO: 9.6 K/UL (ref 3.9–12.7)

## 2024-09-03 PROCEDURE — 80053 COMPREHEN METABOLIC PANEL: CPT | Performed by: FAMILY MEDICINE

## 2024-09-03 PROCEDURE — 80061 LIPID PANEL: CPT | Performed by: FAMILY MEDICINE

## 2024-09-03 PROCEDURE — 83036 HEMOGLOBIN GLYCOSYLATED A1C: CPT | Performed by: FAMILY MEDICINE

## 2024-09-03 PROCEDURE — 85025 COMPLETE CBC W/AUTO DIFF WBC: CPT | Performed by: FAMILY MEDICINE

## 2024-09-03 PROCEDURE — 83970 ASSAY OF PARATHORMONE: CPT | Performed by: FAMILY MEDICINE

## 2024-09-17 ENCOUNTER — HOSPITAL ENCOUNTER (OUTPATIENT)
Dept: RADIOLOGY | Facility: HOSPITAL | Age: 64
Discharge: HOME | End: 2024-09-17
Attending: FAMILY MEDICINE
Payer: MEDICARE

## 2024-09-17 DIAGNOSIS — M25.551 PAIN OF RIGHT HIP: ICD-10-CM

## 2024-09-17 PROCEDURE — 73502 X-RAY EXAM HIP UNI 2-3 VIEWS: CPT | Mod: 26,RT,, | Performed by: RADIOLOGY

## 2024-09-17 PROCEDURE — 73502 X-RAY EXAM HIP UNI 2-3 VIEWS: CPT | Mod: TC,FY,RT

## 2024-11-21 ENCOUNTER — TELEPHONE (OUTPATIENT)
Dept: WOUND CARE | Facility: HOSPITAL | Age: 64
End: 2024-11-21
Payer: MEDICARE

## 2024-11-21 NOTE — TELEPHONE ENCOUNTER
Spoke with Ricki at [facility] pharmacy he requested a supervisor to clarify Dr. Horner's RX  from 10/11/2024 for a flagged e-script. I directed him to 's nurse at his clinic. I gave him the phone and fax #.

## 2024-11-22 ENCOUNTER — ANESTHESIA (OUTPATIENT)
Dept: SURGERY | Facility: HOSPITAL | Age: 64
End: 2024-11-22
Payer: MEDICARE

## 2024-11-22 ENCOUNTER — HOSPITAL ENCOUNTER (INPATIENT)
Facility: HOSPITAL | Age: 64
LOS: 6 days | Discharge: HOME HEALTH | DRG: 326 | End: 2024-11-28
Attending: EMERGENCY MEDICINE | Admitting: STUDENT IN AN ORGANIZED HEALTH CARE EDUCATION/TRAINING PROGRAM
Payer: MEDICARE

## 2024-11-22 DIAGNOSIS — R10.10 UPPER ABDOMINAL PAIN: ICD-10-CM

## 2024-11-22 DIAGNOSIS — R79.89 ELEVATED TROPONIN: ICD-10-CM

## 2024-11-22 DIAGNOSIS — R19.8 PERFORATED ABDOMINAL VISCUS: ICD-10-CM

## 2024-11-22 DIAGNOSIS — E87.5 HYPERKALEMIA: ICD-10-CM

## 2024-11-22 DIAGNOSIS — E11.10 DIABETIC KETOACIDOSIS WITHOUT COMA ASSOCIATED WITH TYPE 2 DIABETES MELLITUS: Primary | ICD-10-CM

## 2024-11-22 DIAGNOSIS — N17.9 ACUTE KIDNEY INJURY: ICD-10-CM

## 2024-11-22 DIAGNOSIS — R00.0 TACHYCARDIA: ICD-10-CM

## 2024-11-22 DIAGNOSIS — E11.51 TYPE 2 DIABETES MELLITUS WITH PERIPHERAL ANGIOPATHY: Chronic | ICD-10-CM

## 2024-11-22 DIAGNOSIS — K66.8 FREE INTRAPERITONEAL AIR: ICD-10-CM

## 2024-11-22 DIAGNOSIS — R73.9 HYPERGLYCEMIA: ICD-10-CM

## 2024-11-22 PROBLEM — K63.1 PERFORATION BOWEL: Status: ACTIVE | Noted: 2024-11-22

## 2024-11-22 LAB
ALBUMIN SERPL BCP-MCNC: 2.7 G/DL (ref 3.5–5.2)
ALBUMIN SERPL BCP-MCNC: 3.1 G/DL (ref 3.5–5.2)
ALP SERPL-CCNC: 67 U/L (ref 40–150)
ALP SERPL-CCNC: 76 U/L (ref 40–150)
ALT SERPL W/O P-5'-P-CCNC: 11 U/L (ref 10–44)
ALT SERPL W/O P-5'-P-CCNC: 9 U/L (ref 10–44)
ANION GAP SERPL CALC-SCNC: 19 MMOL/L (ref 8–16)
ANION GAP SERPL CALC-SCNC: 24 MMOL/L (ref 8–16)
AST SERPL-CCNC: 14 U/L (ref 10–40)
AST SERPL-CCNC: 18 U/L (ref 10–40)
B-OH-BUTYR BLD STRIP-SCNC: 2.3 MMOL/L (ref 0–0.5)
BASOPHILS # BLD AUTO: 0.05 K/UL (ref 0–0.2)
BASOPHILS NFR BLD: 0.4 % (ref 0–1.9)
BILIRUB SERPL-MCNC: 0.5 MG/DL (ref 0.1–1)
BILIRUB SERPL-MCNC: 0.5 MG/DL (ref 0.1–1)
BUN SERPL-MCNC: 29 MG/DL (ref 8–23)
BUN SERPL-MCNC: 30 MG/DL (ref 8–23)
CALCIUM SERPL-MCNC: 8.9 MG/DL (ref 8.7–10.5)
CALCIUM SERPL-MCNC: 9.7 MG/DL (ref 8.7–10.5)
CHLORIDE SERPL-SCNC: 101 MMOL/L (ref 95–110)
CHLORIDE SERPL-SCNC: 104 MMOL/L (ref 95–110)
CO2 SERPL-SCNC: 13 MMOL/L (ref 23–29)
CO2 SERPL-SCNC: 16 MMOL/L (ref 23–29)
CREAT SERPL-MCNC: 2.4 MG/DL (ref 0.5–1.4)
CREAT SERPL-MCNC: 2.5 MG/DL (ref 0.5–1.4)
DIFFERENTIAL METHOD BLD: ABNORMAL
EOSINOPHIL # BLD AUTO: 0 K/UL (ref 0–0.5)
EOSINOPHIL NFR BLD: 0.2 % (ref 0–8)
ERYTHROCYTE [DISTWIDTH] IN BLOOD BY AUTOMATED COUNT: 15.3 % (ref 11.5–14.5)
EST. GFR  (NO RACE VARIABLE): 28 ML/MIN/1.73 M^2
EST. GFR  (NO RACE VARIABLE): 29 ML/MIN/1.73 M^2
FIO2: 21 %
GLUCOSE SERPL-MCNC: 488 MG/DL (ref 70–110)
GLUCOSE SERPL-MCNC: 520 MG/DL (ref 70–110)
HCT VFR BLD AUTO: 50.4 % (ref 40–54)
HGB BLD-MCNC: 16.1 G/DL (ref 14–18)
IMM GRANULOCYTES # BLD AUTO: 0.11 K/UL (ref 0–0.04)
IMM GRANULOCYTES NFR BLD AUTO: 1 % (ref 0–0.5)
LACTATE SERPL-SCNC: 3.1 MMOL/L (ref 0.5–2.2)
LYMPHOCYTES # BLD AUTO: 1.1 K/UL (ref 1–4.8)
LYMPHOCYTES NFR BLD: 9.9 % (ref 18–48)
MAGNESIUM SERPL-MCNC: 2.2 MG/DL (ref 1.6–2.6)
MCH RBC QN AUTO: 27.7 PG (ref 27–31)
MCHC RBC AUTO-ENTMCNC: 31.9 G/DL (ref 32–36)
MCV RBC AUTO: 87 FL (ref 82–98)
MONOCYTES # BLD AUTO: 0.5 K/UL (ref 0.3–1)
MONOCYTES NFR BLD: 4.2 % (ref 4–15)
NEUTROPHILS # BLD AUTO: 9.5 K/UL (ref 1.8–7.7)
NEUTROPHILS NFR BLD: 84.3 % (ref 38–73)
NRBC BLD-RTO: 0 /100 WBC
PCO2 BLDA: 40.6 MMHG (ref 35–45)
PH SMN: 7.26 [PH] (ref 7.35–7.45)
PHOSPHATE SERPL-MCNC: 6.2 MG/DL (ref 2.7–4.5)
PLATELET # BLD AUTO: 241 K/UL (ref 150–450)
PMV BLD AUTO: 11.3 FL (ref 9.2–12.9)
PO2 BLDA: 24.9 MMHG (ref 40–60)
POC BASE DEFICIT: -8.5 MMOL/L (ref -2–2)
POC HCO3: 18.2 MMOL/L (ref 24–28)
POC PERFORMED BY: ABNORMAL
POC SATURATED O2: 35.5 % (ref 95–100)
POCT GLUCOSE: 429 MG/DL (ref 70–110)
POCT GLUCOSE: 473 MG/DL (ref 70–110)
POCT GLUCOSE: 496 MG/DL (ref 70–110)
POTASSIUM SERPL-SCNC: 4.5 MMOL/L (ref 3.5–5.1)
POTASSIUM SERPL-SCNC: 4.9 MMOL/L (ref 3.5–5.1)
PROT SERPL-MCNC: 6.7 G/DL (ref 6–8.4)
PROT SERPL-MCNC: 7.6 G/DL (ref 6–8.4)
RBC # BLD AUTO: 5.82 M/UL (ref 4.6–6.2)
SODIUM SERPL-SCNC: 138 MMOL/L (ref 136–145)
SODIUM SERPL-SCNC: 139 MMOL/L (ref 136–145)
SPECIMEN SOURCE: ABNORMAL
TROPONIN I SERPL DL<=0.01 NG/ML-MCNC: 0.05 NG/ML (ref 0–0.03)
TROPONIN I SERPL DL<=0.01 NG/ML-MCNC: 0.56 NG/ML (ref 0–0.03)
WBC # BLD AUTO: 11.26 K/UL (ref 3.9–12.7)

## 2024-11-22 PROCEDURE — 93010 ELECTROCARDIOGRAM REPORT: CPT | Mod: ,,, | Performed by: INTERNAL MEDICINE

## 2024-11-22 PROCEDURE — 96375 TX/PRO/DX INJ NEW DRUG ADDON: CPT

## 2024-11-22 PROCEDURE — 93005 ELECTROCARDIOGRAM TRACING: CPT

## 2024-11-22 PROCEDURE — 84484 ASSAY OF TROPONIN QUANT: CPT | Mod: 91

## 2024-11-22 PROCEDURE — 99285 EMERGENCY DEPT VISIT HI MDM: CPT | Mod: 25

## 2024-11-22 PROCEDURE — 83605 ASSAY OF LACTIC ACID: CPT | Performed by: EMERGENCY MEDICINE

## 2024-11-22 PROCEDURE — 96361 HYDRATE IV INFUSION ADD-ON: CPT

## 2024-11-22 PROCEDURE — 84484 ASSAY OF TROPONIN QUANT: CPT | Performed by: PHYSICIAN ASSISTANT

## 2024-11-22 PROCEDURE — 63600175 PHARM REV CODE 636 W HCPCS

## 2024-11-22 PROCEDURE — 99900035 HC TECH TIME PER 15 MIN (STAT)

## 2024-11-22 PROCEDURE — 83735 ASSAY OF MAGNESIUM: CPT | Performed by: EMERGENCY MEDICINE

## 2024-11-22 PROCEDURE — 94761 N-INVAS EAR/PLS OXIMETRY MLT: CPT | Mod: XB

## 2024-11-22 PROCEDURE — 25000003 PHARM REV CODE 250: Performed by: PHYSICIAN ASSISTANT

## 2024-11-22 PROCEDURE — 87154 CUL TYP ID BLD PTHGN 6+ TRGT: CPT | Performed by: EMERGENCY MEDICINE

## 2024-11-22 PROCEDURE — 25000003 PHARM REV CODE 250

## 2024-11-22 PROCEDURE — 84100 ASSAY OF PHOSPHORUS: CPT | Performed by: EMERGENCY MEDICINE

## 2024-11-22 PROCEDURE — 96374 THER/PROPH/DIAG INJ IV PUSH: CPT

## 2024-11-22 PROCEDURE — 82010 KETONE BODYS QUAN: CPT | Performed by: PHYSICIAN ASSISTANT

## 2024-11-22 PROCEDURE — 82803 BLOOD GASES ANY COMBINATION: CPT

## 2024-11-22 PROCEDURE — 85025 COMPLETE CBC W/AUTO DIFF WBC: CPT | Performed by: PHYSICIAN ASSISTANT

## 2024-11-22 PROCEDURE — 12000002 HC ACUTE/MED SURGE SEMI-PRIVATE ROOM

## 2024-11-22 PROCEDURE — 63600175 PHARM REV CODE 636 W HCPCS: Performed by: EMERGENCY MEDICINE

## 2024-11-22 PROCEDURE — 80053 COMPREHEN METABOLIC PANEL: CPT | Mod: 91 | Performed by: EMERGENCY MEDICINE

## 2024-11-22 PROCEDURE — 87040 BLOOD CULTURE FOR BACTERIA: CPT | Performed by: EMERGENCY MEDICINE

## 2024-11-22 RX ORDER — METRONIDAZOLE 500 MG/100ML
500 INJECTION, SOLUTION INTRAVENOUS
Status: DISCONTINUED | OUTPATIENT
Start: 2024-11-23 | End: 2024-11-26

## 2024-11-22 RX ORDER — ASPIRIN 325 MG
325 TABLET ORAL
Status: COMPLETED | OUTPATIENT
Start: 2024-11-22 | End: 2024-11-22

## 2024-11-22 RX ORDER — SODIUM CHLORIDE 0.9 % (FLUSH) 0.9 %
10 SYRINGE (ML) INJECTION
Status: DISCONTINUED | OUTPATIENT
Start: 2024-11-23 | End: 2024-11-22

## 2024-11-22 RX ORDER — CEFTRIAXONE 2 G/1
2 INJECTION, POWDER, FOR SOLUTION INTRAMUSCULAR; INTRAVENOUS
Status: COMPLETED | OUTPATIENT
Start: 2024-11-22 | End: 2024-11-22

## 2024-11-22 RX ORDER — SODIUM CHLORIDE 0.9 % (FLUSH) 0.9 %
10 SYRINGE (ML) INJECTION
Status: DISCONTINUED | OUTPATIENT
Start: 2024-11-22 | End: 2024-11-28 | Stop reason: HOSPADM

## 2024-11-22 RX ORDER — HYDROMORPHONE HYDROCHLORIDE 1 MG/ML
0.5 INJECTION, SOLUTION INTRAMUSCULAR; INTRAVENOUS; SUBCUTANEOUS
Status: COMPLETED | OUTPATIENT
Start: 2024-11-22 | End: 2024-11-22

## 2024-11-22 RX ORDER — DEXTROSE MONOHYDRATE AND SODIUM CHLORIDE 5; .45 G/100ML; G/100ML
INJECTION, SOLUTION INTRAVENOUS CONTINUOUS PRN
Status: DISCONTINUED | OUTPATIENT
Start: 2024-11-22 | End: 2024-11-28 | Stop reason: HOSPADM

## 2024-11-22 RX ORDER — PANTOPRAZOLE SODIUM 40 MG/10ML
40 INJECTION, POWDER, LYOPHILIZED, FOR SOLUTION INTRAVENOUS 2 TIMES DAILY
Status: DISCONTINUED | OUTPATIENT
Start: 2024-11-23 | End: 2024-11-26

## 2024-11-22 RX ORDER — FLUCONAZOLE 2 MG/ML
400 INJECTION, SOLUTION INTRAVENOUS
Status: COMPLETED | OUTPATIENT
Start: 2024-11-22 | End: 2024-11-26

## 2024-11-22 RX ORDER — PANTOPRAZOLE SODIUM 40 MG/10ML
80 INJECTION, POWDER, LYOPHILIZED, FOR SOLUTION INTRAVENOUS
Status: DISCONTINUED | OUTPATIENT
Start: 2024-11-22 | End: 2024-11-23

## 2024-11-22 RX ORDER — METRONIDAZOLE 500 MG/100ML
500 INJECTION, SOLUTION INTRAVENOUS
Status: DISCONTINUED | OUTPATIENT
Start: 2024-11-22 | End: 2024-11-22

## 2024-11-22 RX ORDER — SODIUM CHLORIDE, SODIUM LACTATE, POTASSIUM CHLORIDE, CALCIUM CHLORIDE 600; 310; 30; 20 MG/100ML; MG/100ML; MG/100ML; MG/100ML
INJECTION, SOLUTION INTRAVENOUS CONTINUOUS
Status: DISCONTINUED | OUTPATIENT
Start: 2024-11-23 | End: 2024-11-22

## 2024-11-22 RX ORDER — DEXTROSE MONOHYDRATE AND SODIUM CHLORIDE 5; .45 G/100ML; G/100ML
INJECTION, SOLUTION INTRAVENOUS CONTINUOUS PRN
Status: DISCONTINUED | OUTPATIENT
Start: 2024-11-23 | End: 2024-11-22

## 2024-11-22 RX ORDER — SODIUM CHLORIDE 9 MG/ML
1000 INJECTION, SOLUTION INTRAVENOUS CONTINUOUS
Status: DISCONTINUED | OUTPATIENT
Start: 2024-11-22 | End: 2024-11-22

## 2024-11-22 RX ORDER — HYDROMORPHONE HYDROCHLORIDE 1 MG/ML
0.5 INJECTION, SOLUTION INTRAMUSCULAR; INTRAVENOUS; SUBCUTANEOUS EVERY 6 HOURS PRN
Status: DISCONTINUED | OUTPATIENT
Start: 2024-11-23 | End: 2024-11-23

## 2024-11-22 RX ORDER — CEFEPIME HYDROCHLORIDE 2 G/1
2 INJECTION, POWDER, FOR SOLUTION INTRAVENOUS
Status: DISCONTINUED | OUTPATIENT
Start: 2024-11-23 | End: 2024-11-25

## 2024-11-22 RX ORDER — SODIUM CHLORIDE 9 MG/ML
1000 INJECTION, SOLUTION INTRAVENOUS CONTINUOUS
Status: DISCONTINUED | OUTPATIENT
Start: 2024-11-23 | End: 2024-11-23

## 2024-11-22 RX ADMIN — ASPIRIN 325 MG ORAL TABLET 325 MG: 325 PILL ORAL at 09:11

## 2024-11-22 RX ADMIN — CEFTRIAXONE SODIUM 2 G: 2 INJECTION, POWDER, FOR SOLUTION INTRAMUSCULAR; INTRAVENOUS at 11:11

## 2024-11-22 RX ADMIN — SODIUM CHLORIDE 500 ML: 9 INJECTION, SOLUTION INTRAVENOUS at 09:11

## 2024-11-22 RX ADMIN — HYDROMORPHONE HYDROCHLORIDE 0.5 MG: 1 INJECTION, SOLUTION INTRAMUSCULAR; INTRAVENOUS; SUBCUTANEOUS at 08:11

## 2024-11-22 RX ADMIN — SODIUM CHLORIDE 1000 ML: 9 INJECTION, SOLUTION INTRAVENOUS at 07:11

## 2024-11-22 RX ADMIN — SODIUM CHLORIDE 0.1 UNITS/KG/HR: 9 INJECTION, SOLUTION INTRAVENOUS at 11:11

## 2024-11-22 NOTE — FIRST PROVIDER EVALUATION
Emergency Department TeleTriage Encounter Note      CHIEF COMPLAINT    Chief Complaint   Patient presents with    Abdominal Pain     Patient presents to the ED complaining of epigastric abdominal pain that started yesterday. Endorses nausea, emesis. Patient appears uncomfortable.       VITAL SIGNS   Initial Vitals [11/22/24 1518]   BP Pulse Resp Temp SpO2   107/64 (!) 140 20 97.5 °F (36.4 °C) 99 %      MAP       --            ALLERGIES    Review of patient's allergies indicates:   Allergen Reactions    Penicillins Rash       PROVIDER TRIAGE NOTE  This is a teletriage evaluation of a 64 y.o. male presenting to the ED complaining of epigastric pain. Patient reports epigastric abdominal pain since 4am. He also reports nausea and vomiting. Denies diarrhea.     Patient is alert and oriented. He speaks in complete sentences. He is sitting upright in the chair in no distress.     On-site providers were messaged about this patient.    Initial orders will be placed and care will be transferred to an alternate provider when patient is roomed for a full evaluation. Any additional orders and the final disposition will be determined by that provider.         ORDERS  Labs Reviewed   CBC W/ AUTO DIFFERENTIAL   COMPREHENSIVE METABOLIC PANEL   LIPASE   URINALYSIS, REFLEX TO URINE CULTURE   TROPONIN I   BETA - HYDROXYBUTYRATE, SERUM   POCT GLUCOSE MONITORING CONTINUOUS       ED Orders (720h ago, onward)      Start Ordered     Status Ordering Provider    11/22/24 1715 11/22/24 1705  sodium chloride 0.9% bolus 500 mL 500 mL  ED 1 Time         Ordered LOIS, KATHERYN G.    11/22/24 1706 11/22/24 1705  Beta - Hydroxybutyrate, Serum  Once         Ordered LOIS, KATHERYN G.    11/22/24 1704 11/22/24 1703  POCT glucose  Once         Ordered LOIS, KATHERYN G.    11/22/24 1701 11/22/24 1700  Troponin I  STAT         Ordered LOIS, KATHERYN G.    11/22/24 1700 11/22/24 1659  Vital signs  Every 2 hours         Ordered LOIS, KATHERYN G.    11/22/24 1700  11/22/24 1659  Diet NPO  Diet effective now         Ordered KATHERYN ABREU.    11/22/24 1700 11/22/24 1659  Insert peripheral IV  Once         Ordered LOISKATHERYN.    11/22/24 1700 11/22/24 1659  CBC W/ AUTO DIFFERENTIAL  STAT         Ordered LOISKATHERYN VU.    11/22/24 1700 11/22/24 1659  Comp. Metabolic Panel  STAT         Ordered LOISKATHERYN.    11/22/24 1700 11/22/24 1659  Lipase  STAT         Ordered LOISKATHERYN.    11/22/24 1700 11/22/24 1659  Urinalysis, Reflex to Urine Culture Urine, Clean Catch  STAT         Ordered LOISKATHERYN.    11/22/24 1700 11/22/24 1659  CT Abdomen Pelvis With IV Contrast NO Oral Contrast  1 time imaging         Ordered KATHERYN ABREU.    11/22/24 1520 11/22/24 1519  EKG 12-lead  Once         Completed by ALEXANDRU AGUILAR on 11/22/2024 at  3:20 PM MELECIO SORIA              Virtual Visit Note: The provider triage portion of this emergency department evaluation and documentation was performed via [employer], a HIPAA-compliant telemedicine application, in concert with a tele-presenter in the room. A face to face patient evaluation with one of my colleagues will occur once the patient is placed in an emergency department room.      DISCLAIMER: This note was prepared with [employer] voice recognition transcription software. Garbled syntax, mangled pronouns, and other bizarre constructions may be attributed to that software system.

## 2024-11-23 ENCOUNTER — ANESTHESIA EVENT (OUTPATIENT)
Dept: SURGERY | Facility: HOSPITAL | Age: 64
End: 2024-11-23
Payer: MEDICARE

## 2024-11-23 PROBLEM — E87.5 HYPERKALEMIA: Status: ACTIVE | Noted: 2024-11-23

## 2024-11-23 PROBLEM — R19.8 PERFORATED ABDOMINAL VISCUS: Status: ACTIVE | Noted: 2024-11-22

## 2024-11-23 PROBLEM — R73.9 HYPERGLYCEMIA: Status: ACTIVE | Noted: 2024-11-23

## 2024-11-23 LAB
ACINETOBACTER CALCOACETICUS/BAUMANNII COMPLEX: NOT DETECTED
ALBUMIN SERPL BCP-MCNC: 2.4 G/DL (ref 3.5–5.2)
ALP SERPL-CCNC: 60 U/L (ref 40–150)
ALT SERPL W/O P-5'-P-CCNC: 11 U/L (ref 10–44)
ANION GAP SERPL CALC-SCNC: 10 MMOL/L (ref 8–16)
ANION GAP SERPL CALC-SCNC: 10 MMOL/L (ref 8–16)
ANION GAP SERPL CALC-SCNC: 11 MMOL/L (ref 8–16)
ANION GAP SERPL CALC-SCNC: 14 MMOL/L (ref 8–16)
ANION GAP SERPL CALC-SCNC: 15 MMOL/L (ref 8–16)
ANION GAP SERPL CALC-SCNC: 15 MMOL/L (ref 8–16)
ANION GAP SERPL CALC-SCNC: 8 MMOL/L (ref 8–16)
ANION GAP SERPL CALC-SCNC: 9 MMOL/L (ref 8–16)
APICAL FOUR CHAMBER EJECTION FRACTION: 52 %
APICAL TWO CHAMBER EJECTION FRACTION: 40 %
AST SERPL-CCNC: 20 U/L (ref 10–40)
AV INDEX (PROSTH): 0.6
AV MEAN GRADIENT: 10.5 MMHG
AV PEAK GRADIENT: 19.4 MMHG
AV VELOCITY RATIO: 0.55
BACTERIA #/AREA URNS HPF: ABNORMAL /HPF
BACTEROIDES FRAGILIS: NOT DETECTED
BASOPHILS # BLD AUTO: 0.07 K/UL (ref 0–0.2)
BASOPHILS # BLD AUTO: 0.07 K/UL (ref 0–0.2)
BASOPHILS NFR BLD: 0.9 % (ref 0–1.9)
BASOPHILS NFR BLD: 0.9 % (ref 0–1.9)
BILIRUB SERPL-MCNC: 0.3 MG/DL (ref 0.1–1)
BILIRUB UR QL STRIP: NEGATIVE
BSA FOR ECHO PROCEDURE: 2.33 M2
BUN SERPL-MCNC: 27 MG/DL (ref 8–23)
BUN SERPL-MCNC: 32 MG/DL (ref 8–23)
BUN SERPL-MCNC: 33 MG/DL (ref 8–23)
BUN SERPL-MCNC: 34 MG/DL (ref 8–23)
CALCIUM SERPL-MCNC: 18.5 MG/DL (ref 8.7–10.5)
CALCIUM SERPL-MCNC: 8.5 MG/DL (ref 8.7–10.5)
CALCIUM SERPL-MCNC: 8.5 MG/DL (ref 8.7–10.5)
CALCIUM SERPL-MCNC: 8.6 MG/DL (ref 8.7–10.5)
CALCIUM SERPL-MCNC: 8.8 MG/DL (ref 8.7–10.5)
CALCIUM SERPL-MCNC: 9 MG/DL (ref 8.7–10.5)
CANDIDA ALBICANS: NOT DETECTED
CANDIDA AURIS: NOT DETECTED
CANDIDA GLABRATA: NOT DETECTED
CANDIDA KRUSEI: NOT DETECTED
CANDIDA PARAPSILOSIS: NOT DETECTED
CANDIDA TROPICALIS: NOT DETECTED
CHLORIDE SERPL-SCNC: 110 MMOL/L (ref 95–110)
CHLORIDE SERPL-SCNC: 110 MMOL/L (ref 95–110)
CHLORIDE SERPL-SCNC: 112 MMOL/L (ref 95–110)
CHLORIDE SERPL-SCNC: 113 MMOL/L (ref 95–110)
CHLORIDE SERPL-SCNC: 114 MMOL/L (ref 95–110)
CLARITY UR: ABNORMAL
CO2 SERPL-SCNC: 16 MMOL/L (ref 23–29)
CO2 SERPL-SCNC: 18 MMOL/L (ref 23–29)
CO2 SERPL-SCNC: 19 MMOL/L (ref 23–29)
CO2 SERPL-SCNC: 19 MMOL/L (ref 23–29)
CO2 SERPL-SCNC: 20 MMOL/L (ref 23–29)
COLOR UR: ABNORMAL
CREAT SERPL-MCNC: 1.5 MG/DL (ref 0.5–1.4)
CREAT SERPL-MCNC: 1.6 MG/DL (ref 0.5–1.4)
CREAT SERPL-MCNC: 1.8 MG/DL (ref 0.5–1.4)
CREAT SERPL-MCNC: 1.9 MG/DL (ref 0.5–1.4)
CREAT SERPL-MCNC: 2 MG/DL (ref 0.5–1.4)
CREAT SERPL-MCNC: 2.4 MG/DL (ref 0.5–1.4)
CREAT UR-MCNC: 209.4 MG/DL (ref 23–375)
CRYPTOCOCCUS NEOFORMANS/GATTII: NOT DETECTED
CTX-M GENE (ESBL PRODUCER): ABNORMAL
CV ECHO LV RWT: 0.63 CM
DIFFERENTIAL METHOD BLD: ABNORMAL
DIFFERENTIAL METHOD BLD: ABNORMAL
DOP CALC AO PEAK VEL: 2.2 M/S
DOP CALC AO VTI: 37.3 CM
DOP CALC LVOT PEAK VEL: 1.2 M/S
DOP CALCLVOT PEAK VEL VTI: 22.4 CM
E WAVE DECELERATION TIME: 277.16 MSEC
E/A RATIO: 0.77
E/E' RATIO: 9.85 M/S
ECHO LV POSTERIOR WALL: 1.1 CM (ref 0.6–1.1)
ENTEROBACTER CLOACAE COMPLEX: NOT DETECTED
ENTEROBACTERALES: NOT DETECTED
ENTEROCOCCUS FAECALIS: NOT DETECTED
ENTEROCOCCUS FAECIUM: NOT DETECTED
EOSINOPHIL # BLD AUTO: 0.1 K/UL (ref 0–0.5)
EOSINOPHIL # BLD AUTO: 0.1 K/UL (ref 0–0.5)
EOSINOPHIL NFR BLD: 1.2 % (ref 0–8)
EOSINOPHIL NFR BLD: 1.2 % (ref 0–8)
ERYTHROCYTE [DISTWIDTH] IN BLOOD BY AUTOMATED COUNT: 15.2 % (ref 11.5–14.5)
ERYTHROCYTE [DISTWIDTH] IN BLOOD BY AUTOMATED COUNT: 15.2 % (ref 11.5–14.5)
ESCHERICHIA COLI: NOT DETECTED
EST. GFR  (NO RACE VARIABLE): 29 ML/MIN/1.73 M^2
EST. GFR  (NO RACE VARIABLE): 37 ML/MIN/1.73 M^2
EST. GFR  (NO RACE VARIABLE): 39 ML/MIN/1.73 M^2
EST. GFR  (NO RACE VARIABLE): 42 ML/MIN/1.73 M^2
EST. GFR  (NO RACE VARIABLE): 48 ML/MIN/1.73 M^2
EST. GFR  (NO RACE VARIABLE): 52 ML/MIN/1.73 M^2
FRACTIONAL SHORTENING: 22.9 % (ref 28–44)
GLUCOSE SERPL-MCNC: 101 MG/DL (ref 70–110)
GLUCOSE SERPL-MCNC: 125 MG/DL (ref 70–110)
GLUCOSE SERPL-MCNC: 153 MG/DL (ref 70–110)
GLUCOSE SERPL-MCNC: 176 MG/DL (ref 70–110)
GLUCOSE SERPL-MCNC: 177 MG/DL (ref 70–110)
GLUCOSE SERPL-MCNC: 179 MG/DL (ref 70–110)
GLUCOSE SERPL-MCNC: 179 MG/DL (ref 70–110)
GLUCOSE SERPL-MCNC: 231 MG/DL (ref 70–110)
GLUCOSE UR QL STRIP: ABNORMAL
GRAM STN SPEC: NORMAL
GRAM STN SPEC: NORMAL
HAEMOPHILUS INFLUENZAE: NOT DETECTED
HCT VFR BLD AUTO: 42.2 % (ref 40–54)
HCT VFR BLD AUTO: 42.2 % (ref 40–54)
HGB BLD-MCNC: 13.9 G/DL (ref 14–18)
HGB BLD-MCNC: 13.9 G/DL (ref 14–18)
HGB UR QL STRIP: ABNORMAL
HYALINE CASTS #/AREA URNS LPF: 0 /LPF
IMM GRANULOCYTES # BLD AUTO: 0.09 K/UL (ref 0–0.04)
IMM GRANULOCYTES # BLD AUTO: 0.09 K/UL (ref 0–0.04)
IMM GRANULOCYTES NFR BLD AUTO: 1.1 % (ref 0–0.5)
IMM GRANULOCYTES NFR BLD AUTO: 1.1 % (ref 0–0.5)
IMP GENE (CARBAPENEM RESISTANT): ABNORMAL
INTERVENTRICULAR SEPTUM: 1.1 CM (ref 0.6–1.1)
KETONES UR QL STRIP: NEGATIVE
KLEBSIELLA AEROGENES: NOT DETECTED
KLEBSIELLA OXYTOCA: NOT DETECTED
KLEBSIELLA PNEUMONIAE GROUP: NOT DETECTED
KPC RESISTANCE GENE (CARBAPENEM): ABNORMAL
LACTATE SERPL-SCNC: 1.9 MMOL/L (ref 0.5–2.2)
LACTATE SERPL-SCNC: 4.1 MMOL/L (ref 0.5–2.2)
LEFT ATRIUM AREA SYSTOLIC (APICAL 2 CHAMBER): 20.38 CM2
LEFT ATRIUM AREA SYSTOLIC (APICAL 4 CHAMBER): 19.03 CM2
LEFT ATRIUM VOLUME INDEX MOD: 23.7 ML/M2
LEFT ATRIUM VOLUME MOD: 55 ML
LEFT INTERNAL DIMENSION IN SYSTOLE: 2.7 CM (ref 2.1–4)
LEFT VENTRICLE DIASTOLIC VOLUME INDEX: 21.64 ML/M2
LEFT VENTRICLE DIASTOLIC VOLUME: 50.21 ML
LEFT VENTRICLE END DIASTOLIC VOLUME APICAL 2 CHAMBER: 59.36 ML
LEFT VENTRICLE END DIASTOLIC VOLUME APICAL 4 CHAMBER: 57.4 ML
LEFT VENTRICLE END SYSTOLIC VOLUME APICAL 2 CHAMBER: 58.61 ML
LEFT VENTRICLE END SYSTOLIC VOLUME APICAL 4 CHAMBER: 47.56 ML
LEFT VENTRICLE MASS INDEX: 51.3 G/M2
LEFT VENTRICLE SYSTOLIC VOLUME INDEX: 11.5 ML/M2
LEFT VENTRICLE SYSTOLIC VOLUME: 26.69 ML
LEFT VENTRICULAR INTERNAL DIMENSION IN DIASTOLE: 3.5 CM (ref 3.5–6)
LEFT VENTRICULAR MASS: 119 G
LEUKOCYTE ESTERASE UR QL STRIP: NEGATIVE
LISTERIA MONOCYTOGENES: NOT DETECTED
LV LATERAL E/E' RATIO: 7.11 M/S
LV SEPTAL E/E' RATIO: 16 M/S
LVED V (TEICH): 50.21 ML
LVES V (TEICH): 26.69 ML
LVOT MG: 3.04 MMHG
LVOT MV: 0.8 CM/S
LYMPHOCYTES # BLD AUTO: 1.4 K/UL (ref 1–4.8)
LYMPHOCYTES # BLD AUTO: 1.4 K/UL (ref 1–4.8)
LYMPHOCYTES NFR BLD: 16.5 % (ref 18–48)
LYMPHOCYTES NFR BLD: 16.5 % (ref 18–48)
MAGNESIUM SERPL-MCNC: 2 MG/DL (ref 1.6–2.6)
MCH RBC QN AUTO: 28.1 PG (ref 27–31)
MCH RBC QN AUTO: 28.1 PG (ref 27–31)
MCHC RBC AUTO-ENTMCNC: 32.9 G/DL (ref 32–36)
MCHC RBC AUTO-ENTMCNC: 32.9 G/DL (ref 32–36)
MCR-1: ABNORMAL
MCV RBC AUTO: 85 FL (ref 82–98)
MCV RBC AUTO: 85 FL (ref 82–98)
MEC A/C AND MREJ (MRSA): ABNORMAL
MEC A/C: ABNORMAL
MICROSCOPIC COMMENT: ABNORMAL
MONOCYTES # BLD AUTO: 0.7 K/UL (ref 0.3–1)
MONOCYTES # BLD AUTO: 0.7 K/UL (ref 0.3–1)
MONOCYTES NFR BLD: 7.9 % (ref 4–15)
MONOCYTES NFR BLD: 7.9 % (ref 4–15)
MV PEAK A VEL: 0.83 M/S
MV PEAK E VEL: 0.64 M/S
MV STENOSIS PRESSURE HALF TIME: 80.38 MS
MV VALVE AREA P 1/2 METHOD: 2.74 CM2
NDM GENE (CARBAPENEM RESISTANT): ABNORMAL
NEISSERIA MENINGITIDIS: NOT DETECTED
NEUTROPHILS # BLD AUTO: 6 K/UL (ref 1.8–7.7)
NEUTROPHILS # BLD AUTO: 6 K/UL (ref 1.8–7.7)
NEUTROPHILS NFR BLD: 72.4 % (ref 38–73)
NEUTROPHILS NFR BLD: 72.4 % (ref 38–73)
NITRITE UR QL STRIP: NEGATIVE
NRBC BLD-RTO: 0 /100 WBC
NRBC BLD-RTO: 0 /100 WBC
OHS LV EJECTION FRACTION SIMPSONS BIPLANE MOD: 43 %
OXA-48-LIKE (CARBAPENEM RESISTANT): ABNORMAL
PH UR STRIP: 5 [PH] (ref 5–8)
PHOSPHATE SERPL-MCNC: 3.6 MG/DL (ref 2.7–4.5)
PISA MRMAX VEL: 5.12 M/S
PISA TR MAX VEL: 1.2 M/S
PLATELET # BLD AUTO: 223 K/UL (ref 150–450)
PLATELET # BLD AUTO: 223 K/UL (ref 150–450)
PLATELET BLD QL SMEAR: ABNORMAL
PLATELET BLD QL SMEAR: ABNORMAL
PMV BLD AUTO: 11.1 FL (ref 9.2–12.9)
PMV BLD AUTO: 11.1 FL (ref 9.2–12.9)
POCT GLUCOSE: 102 MG/DL (ref 70–110)
POCT GLUCOSE: 114 MG/DL (ref 70–110)
POCT GLUCOSE: 127 MG/DL (ref 70–110)
POCT GLUCOSE: 127 MG/DL (ref 70–110)
POCT GLUCOSE: 128 MG/DL (ref 70–110)
POCT GLUCOSE: 136 MG/DL (ref 70–110)
POCT GLUCOSE: 140 MG/DL (ref 70–110)
POCT GLUCOSE: 184 MG/DL (ref 70–110)
POCT GLUCOSE: 185 MG/DL (ref 70–110)
POCT GLUCOSE: 212 MG/DL (ref 70–110)
POCT GLUCOSE: 240 MG/DL (ref 70–110)
POCT GLUCOSE: 251 MG/DL (ref 70–110)
POCT GLUCOSE: 425 MG/DL (ref 70–110)
POCT GLUCOSE: 85 MG/DL (ref 70–110)
POCT GLUCOSE: 91 MG/DL (ref 70–110)
POTASSIUM SERPL-SCNC: 3.4 MMOL/L (ref 3.5–5.1)
POTASSIUM SERPL-SCNC: 3.5 MMOL/L (ref 3.5–5.1)
POTASSIUM SERPL-SCNC: 3.7 MMOL/L (ref 3.5–5.1)
POTASSIUM SERPL-SCNC: 6.5 MMOL/L (ref 3.5–5.1)
POTASSIUM SERPL-SCNC: 6.6 MMOL/L (ref 3.5–5.1)
POTASSIUM SERPL-SCNC: 7.5 MMOL/L (ref 3.5–5.1)
PROT SERPL-MCNC: 6.3 G/DL (ref 6–8.4)
PROT UR QL STRIP: ABNORMAL
PROTEUS SPECIES: NOT DETECTED
PSEUDOMONAS AERUGINOSA: NOT DETECTED
PV PEAK GRADIENT: 4 MMHG
PV PEAK VELOCITY: 1.05 M/S
RA PRESSURE ESTIMATED: 3 MMHG
RA VOL SYS: 42.73 ML
RBC # BLD AUTO: 4.94 M/UL (ref 4.6–6.2)
RBC # BLD AUTO: 4.94 M/UL (ref 4.6–6.2)
RBC #/AREA URNS HPF: >100 /HPF (ref 0–4)
RIGHT ATRIAL AREA: 16.1 CM2
RIGHT ATRIUM VOLUME AREA LENGTH APICAL 4 CHAMBER: 40.34 ML
RV TB RVSP: 4 MMHG
RV TISSUE DOPPLER FREE WALL SYSTOLIC VELOCITY 1 (APICAL 4 CHAMBER VIEW): 11.8 CM/S
SALMONELLA SP: NOT DETECTED
SERRATIA MARCESCENS: NOT DETECTED
SINUS: 3.25 CM
SODIUM SERPL-SCNC: 138 MMOL/L (ref 136–145)
SODIUM SERPL-SCNC: 138 MMOL/L (ref 136–145)
SODIUM SERPL-SCNC: 140 MMOL/L (ref 136–145)
SODIUM SERPL-SCNC: 141 MMOL/L (ref 136–145)
SODIUM SERPL-SCNC: 142 MMOL/L (ref 136–145)
SODIUM SERPL-SCNC: 144 MMOL/L (ref 136–145)
SODIUM SERPL-SCNC: 145 MMOL/L (ref 136–145)
SODIUM SERPL-SCNC: 145 MMOL/L (ref 136–145)
SODIUM UR-SCNC: 21 MMOL/L (ref 20–250)
SP GR UR STRIP: >1.03 (ref 1–1.03)
SQUAMOUS #/AREA URNS HPF: 2 /HPF
STAPHYLOCOCCUS AUREUS: NOT DETECTED
STAPHYLOCOCCUS EPIDERMIDIS: NOT DETECTED
STAPHYLOCOCCUS LUGDUNESIS: NOT DETECTED
STAPHYLOCOCCUS SPECIES: DETECTED
STENOTROPHOMONAS MALTOPHILIA: NOT DETECTED
STJ: 3.23 CM
STREPTOCOCCUS AGALACTIAE: NOT DETECTED
STREPTOCOCCUS PNEUMONIAE: NOT DETECTED
STREPTOCOCCUS PYOGENES: NOT DETECTED
STREPTOCOCCUS SPECIES: NOT DETECTED
TDI LATERAL: 0.09 M/S
TDI SEPTAL: 0.04 M/S
TDI: 0.07 M/S
TR MAX PG: 6 MMHG
TRICUSPID ANNULAR PLANE SYSTOLIC EXCURSION: 1.62 CM
TROPONIN I SERPL DL<=0.01 NG/ML-MCNC: 0.86 NG/ML (ref 0–0.03)
TROPONIN I SERPL DL<=0.01 NG/ML-MCNC: 1.16 NG/ML (ref 0–0.03)
TV REST PULMONARY ARTERY PRESSURE: 9 MMHG
URN SPEC COLLECT METH UR: ABNORMAL
UROBILINOGEN UR STRIP-ACNC: NEGATIVE EU/DL
VAN A/B (VRE GENE): ABNORMAL
VIM GENE (CARBAPENEM RESISTANT): ABNORMAL
WBC # BLD AUTO: 8.22 K/UL (ref 3.9–12.7)
WBC # BLD AUTO: 8.22 K/UL (ref 3.9–12.7)
WBC #/AREA URNS HPF: 2 /HPF (ref 0–5)
YEAST URNS QL MICRO: ABNORMAL
Z-SCORE OF LEFT VENTRICULAR DIMENSION IN END DIASTOLE: -9.73
Z-SCORE OF LEFT VENTRICULAR DIMENSION IN END SYSTOLE: -5.74

## 2024-11-23 PROCEDURE — 63600175 PHARM REV CODE 636 W HCPCS

## 2024-11-23 PROCEDURE — 94640 AIRWAY INHALATION TREATMENT: CPT

## 2024-11-23 PROCEDURE — 36000707: Performed by: SURGERY

## 2024-11-23 PROCEDURE — 83605 ASSAY OF LACTIC ACID: CPT | Performed by: EMERGENCY MEDICINE

## 2024-11-23 PROCEDURE — 80048 BASIC METABOLIC PNL TOTAL CA: CPT | Mod: 91,XB | Performed by: INTERNAL MEDICINE

## 2024-11-23 PROCEDURE — 80048 BASIC METABOLIC PNL TOTAL CA: CPT | Mod: 91,XB | Performed by: STUDENT IN AN ORGANIZED HEALTH CARE EDUCATION/TRAINING PROGRAM

## 2024-11-23 PROCEDURE — S5010 5% DEXTROSE AND 0.45% SALINE: HCPCS

## 2024-11-23 PROCEDURE — 84484 ASSAY OF TROPONIN QUANT: CPT | Mod: 91

## 2024-11-23 PROCEDURE — 87075 CULTR BACTERIA EXCEPT BLOOD: CPT | Performed by: INTERNAL MEDICINE

## 2024-11-23 PROCEDURE — 87206 SMEAR FLUORESCENT/ACID STAI: CPT | Performed by: INTERNAL MEDICINE

## 2024-11-23 PROCEDURE — 63600175 PHARM REV CODE 636 W HCPCS: Performed by: STUDENT IN AN ORGANIZED HEALTH CARE EDUCATION/TRAINING PROGRAM

## 2024-11-23 PROCEDURE — 25000003 PHARM REV CODE 250

## 2024-11-23 PROCEDURE — 36415 COLL VENOUS BLD VENIPUNCTURE: CPT | Performed by: INTERNAL MEDICINE

## 2024-11-23 PROCEDURE — 82570 ASSAY OF URINE CREATININE: CPT

## 2024-11-23 PROCEDURE — 27201423 OPTIME MED/SURG SUP & DEVICES STERILE SUPPLY: Performed by: SURGERY

## 2024-11-23 PROCEDURE — 37000008 HC ANESTHESIA 1ST 15 MINUTES: Performed by: SURGERY

## 2024-11-23 PROCEDURE — 99900035 HC TECH TIME PER 15 MIN (STAT)

## 2024-11-23 PROCEDURE — 87015 SPECIMEN INFECT AGNT CONCNTJ: CPT | Performed by: INTERNAL MEDICINE

## 2024-11-23 PROCEDURE — 87070 CULTURE OTHR SPECIMN AEROBIC: CPT | Performed by: INTERNAL MEDICINE

## 2024-11-23 PROCEDURE — 81000 URINALYSIS NONAUTO W/SCOPE: CPT | Performed by: INTERNAL MEDICINE

## 2024-11-23 PROCEDURE — 85025 COMPLETE CBC W/AUTO DIFF WBC: CPT

## 2024-11-23 PROCEDURE — 25000003 PHARM REV CODE 250: Performed by: STUDENT IN AN ORGANIZED HEALTH CARE EDUCATION/TRAINING PROGRAM

## 2024-11-23 PROCEDURE — C1729 CATH, DRAINAGE: HCPCS | Performed by: SURGERY

## 2024-11-23 PROCEDURE — 0DQ60ZZ REPAIR STOMACH, OPEN APPROACH: ICD-10-PCS | Performed by: SURGERY

## 2024-11-23 PROCEDURE — 36415 COLL VENOUS BLD VENIPUNCTURE: CPT | Mod: XB

## 2024-11-23 PROCEDURE — 83605 ASSAY OF LACTIC ACID: CPT | Mod: 91

## 2024-11-23 PROCEDURE — 80053 COMPREHEN METABOLIC PANEL: CPT

## 2024-11-23 PROCEDURE — 36000706: Performed by: SURGERY

## 2024-11-23 PROCEDURE — 86677 HELICOBACTER PYLORI ANTIBODY: CPT

## 2024-11-23 PROCEDURE — 94761 N-INVAS EAR/PLS OXIMETRY MLT: CPT

## 2024-11-23 PROCEDURE — 84484 ASSAY OF TROPONIN QUANT: CPT

## 2024-11-23 PROCEDURE — 80048 BASIC METABOLIC PNL TOTAL CA: CPT

## 2024-11-23 PROCEDURE — 63600175 PHARM REV CODE 636 W HCPCS: Performed by: INTERNAL MEDICINE

## 2024-11-23 PROCEDURE — 84132 ASSAY OF SERUM POTASSIUM: CPT | Performed by: INTERNAL MEDICINE

## 2024-11-23 PROCEDURE — 87102 FUNGUS ISOLATION CULTURE: CPT | Performed by: INTERNAL MEDICINE

## 2024-11-23 PROCEDURE — 37000009 HC ANESTHESIA EA ADD 15 MINS: Performed by: SURGERY

## 2024-11-23 PROCEDURE — 80048 BASIC METABOLIC PNL TOTAL CA: CPT | Mod: 91,XB

## 2024-11-23 PROCEDURE — 99223 1ST HOSP IP/OBS HIGH 75: CPT | Mod: ,,, | Performed by: SURGERY

## 2024-11-23 PROCEDURE — 63600175 PHARM REV CODE 636 W HCPCS: Mod: JZ,JG | Performed by: SURGERY

## 2024-11-23 PROCEDURE — 36415 COLL VENOUS BLD VENIPUNCTURE: CPT | Performed by: EMERGENCY MEDICINE

## 2024-11-23 PROCEDURE — 94799 UNLISTED PULMONARY SVC/PX: CPT

## 2024-11-23 PROCEDURE — 87205 SMEAR GRAM STAIN: CPT | Performed by: INTERNAL MEDICINE

## 2024-11-23 PROCEDURE — 25000242 PHARM REV CODE 250 ALT 637 W/ HCPCS

## 2024-11-23 PROCEDURE — 84100 ASSAY OF PHOSPHORUS: CPT

## 2024-11-23 PROCEDURE — 87106 FUNGI IDENTIFICATION YEAST: CPT | Mod: 59 | Performed by: INTERNAL MEDICINE

## 2024-11-23 PROCEDURE — 25000003 PHARM REV CODE 250: Performed by: INTERNAL MEDICINE

## 2024-11-23 PROCEDURE — 36415 COLL VENOUS BLD VENIPUNCTURE: CPT | Mod: XB | Performed by: STUDENT IN AN ORGANIZED HEALTH CARE EDUCATION/TRAINING PROGRAM

## 2024-11-23 PROCEDURE — 87116 MYCOBACTERIA CULTURE: CPT | Performed by: INTERNAL MEDICINE

## 2024-11-23 PROCEDURE — 93010 ELECTROCARDIOGRAM REPORT: CPT | Mod: ,,, | Performed by: STUDENT IN AN ORGANIZED HEALTH CARE EDUCATION/TRAINING PROGRAM

## 2024-11-23 PROCEDURE — 83735 ASSAY OF MAGNESIUM: CPT

## 2024-11-23 PROCEDURE — 84300 ASSAY OF URINE SODIUM: CPT

## 2024-11-23 PROCEDURE — 93005 ELECTROCARDIOGRAM TRACING: CPT

## 2024-11-23 PROCEDURE — 20000000 HC ICU ROOM

## 2024-11-23 RX ORDER — ENOXAPARIN SODIUM 100 MG/ML
40 INJECTION SUBCUTANEOUS EVERY 24 HOURS
Status: DISCONTINUED | OUTPATIENT
Start: 2024-11-23 | End: 2024-11-28 | Stop reason: HOSPADM

## 2024-11-23 RX ORDER — SODIUM CHLORIDE, SODIUM LACTATE, POTASSIUM CHLORIDE, CALCIUM CHLORIDE 600; 310; 30; 20 MG/100ML; MG/100ML; MG/100ML; MG/100ML
INJECTION, SOLUTION INTRAVENOUS CONTINUOUS
Status: DISCONTINUED | OUTPATIENT
Start: 2024-11-23 | End: 2024-11-26

## 2024-11-23 RX ORDER — ONDANSETRON HYDROCHLORIDE 2 MG/ML
4 INJECTION, SOLUTION INTRAVENOUS EVERY 6 HOURS PRN
Status: DISCONTINUED | OUTPATIENT
Start: 2024-11-23 | End: 2024-11-28 | Stop reason: HOSPADM

## 2024-11-23 RX ORDER — ALBUTEROL SULFATE 2.5 MG/.5ML
10 SOLUTION RESPIRATORY (INHALATION) ONCE
Status: COMPLETED | OUTPATIENT
Start: 2024-11-23 | End: 2024-11-23

## 2024-11-23 RX ORDER — CALCIUM GLUCONATE 20 MG/ML
1 INJECTION, SOLUTION INTRAVENOUS ONCE
Status: COMPLETED | OUTPATIENT
Start: 2024-11-23 | End: 2024-11-23

## 2024-11-23 RX ORDER — ROCURONIUM BROMIDE 10 MG/ML
INJECTION, SOLUTION INTRAVENOUS
Status: DISCONTINUED | OUTPATIENT
Start: 2024-11-23 | End: 2024-11-23

## 2024-11-23 RX ORDER — SODIUM CHLORIDE, SODIUM LACTATE, POTASSIUM CHLORIDE, CALCIUM CHLORIDE 600; 310; 30; 20 MG/100ML; MG/100ML; MG/100ML; MG/100ML
INJECTION, SOLUTION INTRAVENOUS CONTINUOUS
Status: DISCONTINUED | OUTPATIENT
Start: 2024-11-23 | End: 2024-11-23 | Stop reason: SDUPTHER

## 2024-11-23 RX ORDER — POTASSIUM CHLORIDE 7.45 MG/ML
10 INJECTION INTRAVENOUS
Status: COMPLETED | OUTPATIENT
Start: 2024-11-23 | End: 2024-11-23

## 2024-11-23 RX ORDER — PHENYLEPHRINE HYDROCHLORIDE 10 MG/ML
INJECTION INTRAVENOUS
Status: DISCONTINUED | OUTPATIENT
Start: 2024-11-23 | End: 2024-11-23

## 2024-11-23 RX ORDER — FENTANYL CITRATE 50 UG/ML
INJECTION, SOLUTION INTRAMUSCULAR; INTRAVENOUS
Status: DISCONTINUED | OUTPATIENT
Start: 2024-11-23 | End: 2024-11-23

## 2024-11-23 RX ORDER — CALCIUM GLUCONATE 20 MG/ML
1 INJECTION, SOLUTION INTRAVENOUS ONCE
Status: DISCONTINUED | OUTPATIENT
Start: 2024-11-23 | End: 2024-11-23

## 2024-11-23 RX ORDER — PROPOFOL 10 MG/ML
VIAL (ML) INTRAVENOUS
Status: DISCONTINUED | OUTPATIENT
Start: 2024-11-23 | End: 2024-11-23

## 2024-11-23 RX ORDER — MUPIROCIN 20 MG/G
OINTMENT TOPICAL 2 TIMES DAILY
Status: COMPLETED | OUTPATIENT
Start: 2024-11-23 | End: 2024-11-27

## 2024-11-23 RX ORDER — INSULIN ASPART 100 [IU]/ML
0-10 INJECTION, SOLUTION INTRAVENOUS; SUBCUTANEOUS EVERY 6 HOURS PRN
Status: DISCONTINUED | OUTPATIENT
Start: 2024-11-23 | End: 2024-11-23

## 2024-11-23 RX ORDER — ACETAMINOPHEN 10 MG/ML
1000 INJECTION, SOLUTION INTRAVENOUS EVERY 8 HOURS
Status: DISCONTINUED | OUTPATIENT
Start: 2024-11-23 | End: 2024-11-23

## 2024-11-23 RX ORDER — HYDROMORPHONE HYDROCHLORIDE 2 MG/ML
0.5 INJECTION, SOLUTION INTRAMUSCULAR; INTRAVENOUS; SUBCUTANEOUS
Status: DISCONTINUED | OUTPATIENT
Start: 2024-11-23 | End: 2024-11-26

## 2024-11-23 RX ORDER — HEPARIN SODIUM 5000 [USP'U]/ML
5000 INJECTION, SOLUTION INTRAVENOUS; SUBCUTANEOUS EVERY 8 HOURS
Status: DISCONTINUED | OUTPATIENT
Start: 2024-11-23 | End: 2024-11-23

## 2024-11-23 RX ORDER — CALCIUM GLUCONATE 20 MG/ML
1 INJECTION, SOLUTION INTRAVENOUS EVERY 10 MIN PRN
Status: DISCONTINUED | OUTPATIENT
Start: 2024-11-23 | End: 2024-11-26

## 2024-11-23 RX ORDER — KETAMINE HCL IN 0.9 % NACL 50 MG/5 ML
SYRINGE (ML) INTRAVENOUS
Status: DISCONTINUED | OUTPATIENT
Start: 2024-11-23 | End: 2024-11-23

## 2024-11-23 RX ORDER — GLUCAGON 1 MG
1 KIT INJECTION
Status: DISCONTINUED | OUTPATIENT
Start: 2024-11-23 | End: 2024-11-23

## 2024-11-23 RX ORDER — SUCCINYLCHOLINE CHLORIDE 20 MG/ML
INJECTION INTRAMUSCULAR; INTRAVENOUS
Status: DISCONTINUED | OUTPATIENT
Start: 2024-11-23 | End: 2024-11-23

## 2024-11-23 RX ORDER — ACETAMINOPHEN 10 MG/ML
1000 INJECTION, SOLUTION INTRAVENOUS EVERY 8 HOURS
Status: COMPLETED | OUTPATIENT
Start: 2024-11-23 | End: 2024-11-24

## 2024-11-23 RX ORDER — BUPIVACAINE HYDROCHLORIDE 2.5 MG/ML
INJECTION, SOLUTION EPIDURAL; INFILTRATION; INTRACAUDAL
Status: DISCONTINUED | OUTPATIENT
Start: 2024-11-23 | End: 2024-11-23 | Stop reason: HOSPADM

## 2024-11-23 RX ORDER — CALCIUM GLUCONATE 20 MG/ML
1 INJECTION, SOLUTION INTRAVENOUS EVERY 10 MIN PRN
Status: DISCONTINUED | OUTPATIENT
Start: 2024-11-23 | End: 2024-11-23

## 2024-11-23 RX ORDER — LIDOCAINE HYDROCHLORIDE 20 MG/ML
INJECTION, SOLUTION EPIDURAL; INFILTRATION; INTRACAUDAL; PERINEURAL
Status: DISCONTINUED | OUTPATIENT
Start: 2024-11-23 | End: 2024-11-23

## 2024-11-23 RX ORDER — NOREPINEPHRINE BITARTRATE/D5W 4MG/250ML
PLASTIC BAG, INJECTION (ML) INTRAVENOUS
Status: DISPENSED
Start: 2024-11-23 | End: 2024-11-23

## 2024-11-23 RX ORDER — INSULIN GLARGINE 100 [IU]/ML
7 INJECTION, SOLUTION SUBCUTANEOUS DAILY
Status: DISCONTINUED | OUTPATIENT
Start: 2024-11-23 | End: 2024-11-28 | Stop reason: HOSPADM

## 2024-11-23 RX ORDER — ALBUTEROL SULFATE 2.5 MG/.5ML
10 SOLUTION RESPIRATORY (INHALATION) ONCE
Status: DISCONTINUED | OUTPATIENT
Start: 2024-11-23 | End: 2024-11-23

## 2024-11-23 RX ORDER — MIDAZOLAM HYDROCHLORIDE 1 MG/ML
INJECTION INTRAMUSCULAR; INTRAVENOUS
Status: DISCONTINUED | OUTPATIENT
Start: 2024-11-23 | End: 2024-11-23

## 2024-11-23 RX ORDER — ACETAMINOPHEN 500 MG
1000 TABLET ORAL EVERY 8 HOURS
Status: DISCONTINUED | OUTPATIENT
Start: 2024-11-24 | End: 2024-11-23

## 2024-11-23 RX ORDER — CALCIUM GLUCONATE 20 MG/ML
1 INJECTION, SOLUTION INTRAVENOUS EVERY 10 MIN PRN
Status: DISCONTINUED | OUTPATIENT
Start: 2024-11-23 | End: 2024-11-23 | Stop reason: SDUPTHER

## 2024-11-23 RX ADMIN — VANCOMYCIN HYDROCHLORIDE 1000 MG: 1 INJECTION, POWDER, LYOPHILIZED, FOR SOLUTION INTRAVENOUS at 06:11

## 2024-11-23 RX ADMIN — PANTOPRAZOLE SODIUM 40 MG: 40 INJECTION, POWDER, FOR SOLUTION INTRAVENOUS at 08:11

## 2024-11-23 RX ADMIN — SODIUM CHLORIDE, SODIUM LACTATE, POTASSIUM CHLORIDE, AND CALCIUM CHLORIDE 1000 ML: .6; .31; .03; .02 INJECTION, SOLUTION INTRAVENOUS at 12:11

## 2024-11-23 RX ADMIN — HUMAN INSULIN 10 UNITS: 100 INJECTION, SOLUTION SUBCUTANEOUS at 03:11

## 2024-11-23 RX ADMIN — FLUCONAZOLE 400 MG: 2 INJECTION, SOLUTION INTRAVENOUS at 03:11

## 2024-11-23 RX ADMIN — PHENYLEPHRINE HYDROCHLORIDE 200 MCG: 10 INJECTION INTRAVENOUS at 01:11

## 2024-11-23 RX ADMIN — HYDROMORPHONE HYDROCHLORIDE 0.5 MG: 2 INJECTION, SOLUTION INTRAMUSCULAR; INTRAVENOUS; SUBCUTANEOUS at 04:11

## 2024-11-23 RX ADMIN — CALCIUM GLUCONATE 1 G: 20 INJECTION, SOLUTION INTRAVENOUS at 01:11

## 2024-11-23 RX ADMIN — METRONIDAZOLE 500 MG: 500 INJECTION, SOLUTION INTRAVENOUS at 08:11

## 2024-11-23 RX ADMIN — SODIUM CHLORIDE, POTASSIUM CHLORIDE, SODIUM LACTATE AND CALCIUM CHLORIDE 1000 ML: 600; 310; 30; 20 INJECTION, SOLUTION INTRAVENOUS at 03:11

## 2024-11-23 RX ADMIN — MUPIROCIN: 20 OINTMENT TOPICAL at 08:11

## 2024-11-23 RX ADMIN — DEXTROSE AND SODIUM CHLORIDE: 5; 450 INJECTION, SOLUTION INTRAVENOUS at 04:11

## 2024-11-23 RX ADMIN — METRONIDAZOLE 500 MG: 500 INJECTION, SOLUTION INTRAVENOUS at 11:11

## 2024-11-23 RX ADMIN — SODIUM CHLORIDE, POTASSIUM CHLORIDE, SODIUM LACTATE AND CALCIUM CHLORIDE 1000 ML: 600; 310; 30; 20 INJECTION, SOLUTION INTRAVENOUS at 07:11

## 2024-11-23 RX ADMIN — ACETAMINOPHEN 1000 MG: 1000 INJECTION INTRAVENOUS at 06:11

## 2024-11-23 RX ADMIN — CEFEPIME 2 G: 2 INJECTION, POWDER, FOR SOLUTION INTRAVENOUS at 04:11

## 2024-11-23 RX ADMIN — DEXTROSE MONOHYDRATE 50 G: 25 INJECTION, SOLUTION INTRAVENOUS at 03:11

## 2024-11-23 RX ADMIN — ROCURONIUM BROMIDE 30 MG: 10 INJECTION, SOLUTION INTRAVENOUS at 01:11

## 2024-11-23 RX ADMIN — POTASSIUM CHLORIDE 10 MEQ: 7.45 INJECTION INTRAVENOUS at 08:11

## 2024-11-23 RX ADMIN — Medication 50 MG: at 01:11

## 2024-11-23 RX ADMIN — SODIUM CHLORIDE 1000 ML: 9 INJECTION, SOLUTION INTRAVENOUS at 03:11

## 2024-11-23 RX ADMIN — POTASSIUM CHLORIDE: 2 INJECTION, SOLUTION, CONCENTRATE INTRAVENOUS at 09:11

## 2024-11-23 RX ADMIN — MIDAZOLAM HYDROCHLORIDE 2 MG: 1 INJECTION, SOLUTION INTRAMUSCULAR; INTRAVENOUS at 12:11

## 2024-11-23 RX ADMIN — CEFEPIME 2 G: 2 INJECTION, POWDER, FOR SOLUTION INTRAVENOUS at 06:11

## 2024-11-23 RX ADMIN — ALBUTEROL SULFATE 10 MG: 2.5 SOLUTION RESPIRATORY (INHALATION) at 01:11

## 2024-11-23 RX ADMIN — HEPARIN SODIUM 5000 UNITS: 5000 INJECTION INTRAVENOUS; SUBCUTANEOUS at 07:11

## 2024-11-23 RX ADMIN — SODIUM CHLORIDE, SODIUM LACTATE, POTASSIUM CHLORIDE, AND CALCIUM CHLORIDE: .6; .31; .03; .02 INJECTION, SOLUTION INTRAVENOUS at 12:11

## 2024-11-23 RX ADMIN — SODIUM ZIRCONIUM CYCLOSILICATE 10 G: 5 POWDER, FOR SUSPENSION ORAL at 03:11

## 2024-11-23 RX ADMIN — INSULIN GLARGINE 7 UNITS: 100 INJECTION, SOLUTION SUBCUTANEOUS at 11:11

## 2024-11-23 RX ADMIN — ENOXAPARIN SODIUM 40 MG: 40 INJECTION SUBCUTANEOUS at 04:11

## 2024-11-23 RX ADMIN — PROPOFOL 150 MG: 10 INJECTION, EMULSION INTRAVENOUS at 12:11

## 2024-11-23 RX ADMIN — METRONIDAZOLE 500 MG: 500 INJECTION, SOLUTION INTRAVENOUS at 05:11

## 2024-11-23 RX ADMIN — VANCOMYCIN HYDROCHLORIDE 1000 MG: 1 INJECTION, POWDER, LYOPHILIZED, FOR SOLUTION INTRAVENOUS at 01:11

## 2024-11-23 RX ADMIN — CALCIUM GLUCONATE 1 G: 20 INJECTION, SOLUTION INTRAVENOUS at 03:11

## 2024-11-23 RX ADMIN — SUCCINYLCHOLINE CHLORIDE 160 MG: 20 INJECTION, SOLUTION INTRAMUSCULAR; INTRAVENOUS at 12:11

## 2024-11-23 RX ADMIN — HYDROMORPHONE HYDROCHLORIDE 0.5 MG: 2 INJECTION, SOLUTION INTRAMUSCULAR; INTRAVENOUS; SUBCUTANEOUS at 08:11

## 2024-11-23 RX ADMIN — PHENYLEPHRINE HYDROCHLORIDE 250 MCG: 10 INJECTION INTRAVENOUS at 02:11

## 2024-11-23 RX ADMIN — FENTANYL CITRATE 100 MCG: 50 INJECTION INTRAMUSCULAR; INTRAVENOUS at 12:11

## 2024-11-23 RX ADMIN — ACETAMINOPHEN 1000 MG: 10 INJECTION INTRAVENOUS at 09:11

## 2024-11-23 RX ADMIN — POTASSIUM CHLORIDE 10 MEQ: 7.45 INJECTION INTRAVENOUS at 06:11

## 2024-11-23 RX ADMIN — SODIUM ZIRCONIUM CYCLOSILICATE 10 G: 5 POWDER, FOR SUSPENSION ORAL at 01:11

## 2024-11-23 RX ADMIN — LIDOCAINE HYDROCHLORIDE 100 MG: 20 INJECTION, SOLUTION EPIDURAL; INFILTRATION; INTRACAUDAL; PERINEURAL at 12:11

## 2024-11-23 RX ADMIN — SODIUM CHLORIDE, POTASSIUM CHLORIDE, SODIUM LACTATE AND CALCIUM CHLORIDE: 600; 310; 30; 20 INJECTION, SOLUTION INTRAVENOUS at 01:11

## 2024-11-23 RX ADMIN — ROCURONIUM BROMIDE 50 MG: 10 INJECTION, SOLUTION INTRAVENOUS at 01:11

## 2024-11-23 RX ADMIN — ACETAMINOPHEN 1000 MG: 10 INJECTION INTRAVENOUS at 01:11

## 2024-11-23 NOTE — H&P
[facility] Medicine H&P Note     Admitting Team: [facility] Hospitalist Team B  Attending Physician: Neil Garcia MD  Resident: Sydni  Intern: Lila    Date of Admit: 11/22/2024    Chief Complaint     Acute-onset abdominal pain x 1 day    Subjective:      History of Present Illness:  Al Anna is a 64 y.o. male with history of poorly-controlled T2DM c/b neuropathy and retinopathy, CKD-IIIb, HTN, HLD, and PAD c/b STI of L foot s/p partial amputation who presented to [facility] on 11/22/2024 with a primary complaint of sudden onset abdominal pain that started early this morning.    The patient was in their usual state of health until approximately 0430 this morning when he began experiencing severe, sharp abdominal pain that he localizes to his LUQ with radiation across the epigastrium to his RUQ. He rates the pain as a 10/10 and denies previously experiencing anything similar to this. He has also been experiencing intermittent vomiting and profuse sweating since the onset of the pain but denies experiencing any fever, chills, chest pain, palpitations, SOB, diarrhea, headache, lightheadedness, or dizziness.     He did not take any of his prescribed medications this morning due to the abdominal pain but states that he has otherwise been mostly compliant. He endorses currently smoking but states that he is attempting to cut down and is currently smoking 0.5 ppd.      Past Medical History:  Poorly-controlled Diabetes Mellitus, Type II, c/b Neuropathy and Retinopathy  Chronic Kidney Disease, Stage III-b  Primary Hypertension  Mixed Hyperlipidemia  Peripheral Arterial Disease c/b Soft Tissue Infection of Left Foot s/p Partial Amputation and Arterial Stenting of Left Leg  Lumbar Radiculopathy  Tobacco Use    Past Surgical History:  Past Surgical History:   Procedure Laterality Date    DEBRIDEMENT OF ULCER WITH APPLICATION OF SKIN GRAFT Left 8/26/2022    Procedure: DEBRIDEMENT, ULCER, WITH SKIN GRAFT  APPLICATION;  Surgeon: Dilip Horner MD;  Location: [facility] OR;  Service: General;  Laterality: Left;    FOOT AMPUTATION THROUGH METATARSAL Left 04/28/2020    REVISION, AMPUTATION, LOWER EXTREMITY Left 7/17/2023    Procedure: REVISION, AMPUTATION, LEFT TMA STUMP;  Surgeon: Dilip Horner MD;  Location: [facility] OR;  Service: General;  Laterality: Left;    WOUND DEBRIDEMENT Left 05/23/2022    Procedure: DEBRIDEMENT, WOUND;  Surgeon: Dilip Horner MD;  Location: [facility] OR;  Service: General;  Laterality: Left;    WOUND DEBRIDEMENT Left 7/17/2023    Procedure: DEBRIDEMENT, WOUND left tma stump;  Surgeon: Dilip Horner MD;  Location: [facility] OR;  Service: General;  Laterality: Left;     Allergies:  PCN - rash, denies any angioedema / anaphylaxis    Home Medications:  Dapagliflozin 10 mg PO daily  Gabapentin 300 mg PO qhs  Lisinopril-HCTZ 20-25 mg PO daily  Metformin 1000 mg PO bid  Pioglitazone 45 mg PO daily  Rosuvastatin 5 mg PO daily  Trazodone 100 mg PO qhs  Semaglutide 2 mg SQ weekly  Meclizine 25 mg PO tid prn for dizziness  Zofran 4 mg ODT q6h prn for nausea    Family History:  Mother: T2DM  Father: MI (64 yo)  Sister: Stroke, MI  Brother: Seizure, Stroke, MI    Social History:  Tobacco: Started smoking at age 17, average 1 ppd until recently cutting down to 0.5 ppd. ~45 pack year hx  EtOH: None currently, denies any hx of heavy use  Drug: None, denies any hx of IVDU      Review of Systems:  Pertinent items are noted in HPI. All other systems are reviewed and are negative.    Health Maintaince :   Primary Care Physician: Smith Olmos MD    Immunizations:   Immunization History   Administered Date(s) Administered    COVID-19, MRNA, LN-S, PF (Pfizer) (Purple Cap) 02/26/2021, 03/19/2021    COVID-19, mRNA, LNP-S, PF, charles-sucrose, 30 mcg/0.3 mL (Pfizer Ages 12+) 09/17/2024    Influenza (FLUBLOK) - Quadrivalent - Recombinant - PF *Preferred* (egg allergy) 10/24/2018    Influenza -  "Quadrivalent - PF *Preferred* (6 months and older) 10/11/2017, 10/02/2019    Influenza - Trivalent - Fluarix, Flulaval, Fluzone, Afluria - PF 2024       Tdap: status unknown  Flu: UTD, last administered 2024  Pna: never administered  Shingrix: never administered  COVID-19: UTD, last administered 2024    Cancer Screening:  Colonoscopy: screening colonoscopy never performed but Cologuard negative on 23  LDCT Chest: never performed      Objective:   Last 24 Hour Vital Signs:  BP  Min: 103/58  Max: 140/78  Temp  Av.5 °F (36.4 °C)  Min: 97.5 °F (36.4 °C)  Max: 97.5 °F (36.4 °C)  Pulse  Av.3  Min: 112  Max: 140  Resp  Av.2  Min: 20  Max: 37  SpO2  Av.9 %  Min: 94 %  Max: 100 %  Height  Av' 3.5" (191.8 cm)  Min: 6' 3.5" (191.8 cm)  Max: 6' 3.5" (191.8 cm)  Weight  Av.1 kg (234 lb)  Min: 106.1 kg (234 lb)  Max: 106.1 kg (234 lb)  Body mass index is 28.86 kg/m².  No intake/output data recorded.    Physical Examination:  General: WNWD adult male appearing acutely ill but not septic reclined in bed in mild physical pain  HEENT: NC/AT, EOMI, non-icteric sclera OU, tacky mucus membranes, external ears and nose without deformity  Neck: Supple, trachea midline, no JVD appreciated  CV: RRR, normal S1/S2, no m/c/g/r appreciated  Resp: CTAB, no wheezing or rhonchi appreciated, normal respiratory effort  Abd: Hypoactive bowel sounds, no palpation performed due to patient experiencing significant pain  Ext: 2+ radial pulses, no edema, s/p partial amputation of L foot (historic)  Skin: Warm, dry, intact, no rash or erythema appreciated on exposed skin  Neuro: AAOx3, no obvious focal deficits, spontaneously moving extremities  Psych: Pleasant mood despite circumstances, appropriate affect, cooperative with exam    Laboratory:  Most Recent Data:  CBC:   Lab Results   Component Value Date    WBC 11.26 2024    HGB 16.1 2024    HCT 50.4 2024     2024    MCV 87 " "11/22/2024    RDW 15.3 (H) 11/22/2024     BMP:   Lab Results   Component Value Date     11/22/2024    K 4.5 11/22/2024     11/22/2024    CO2 16 (L) 11/22/2024    BUN 30 (H) 11/22/2024    CREATININE 2.4 (H) 11/22/2024     (HH) 11/22/2024    CALCIUM 8.9 11/22/2024    MG 2.2 11/22/2024    PHOS 6.2 (H) 11/22/2024     LFTs:   Lab Results   Component Value Date    PROT 6.7 11/22/2024    ALBUMIN 2.7 (L) 11/22/2024    BILITOT 0.5 11/22/2024    AST 14 11/22/2024    ALKPHOS 67 11/22/2024    ALT 9 (L) 11/22/2024     Coags:   Lab Results   Component Value Date    INR 0.9 08/19/2013     FLP:   Lab Results   Component Value Date    CHOL 150 09/03/2024    HDL 44 09/03/2024    LDLCALC 87.0 09/03/2024    TRIG 95 09/03/2024    CHOLHDL 29.3 09/03/2024     DM:   Lab Results   Component Value Date    HGBA1C 10.7 (H) 09/03/2024    HGBA1C 8.0 (H) 02/08/2024    HGBA1C 8.1 (H) 11/07/2023    LDLCALC 87.0 09/03/2024    CREATININE 2.4 (H) 11/22/2024     Thyroid:   Lab Results   Component Value Date    TSH 1.879 02/01/2022     Anemia: No results found for: "IRON", "TIBC", "FERRITIN", "[id number]", "FOLATE"  Cardiac:   Lab Results   Component Value Date    TROPONINI 0.560 (H) 11/22/2024    BNP 37 06/25/2021     Urinalysis:   Lab Results   Component Value Date    COLORU Yellow 06/25/2021    SPECGRAV 1.020 06/25/2021    NITRITE Negative 06/25/2021    KETONESU Negative 06/25/2021    UROBILINOGEN Negative 06/25/2021       Trended Lab Data:  Recent Labs   Lab 11/22/24  1811 11/22/24  1902 11/22/24  2245   WBC 11.26  --   --    HGB 16.1  --   --    HCT 50.4  --   --      --   --    MCV 87  --   --    RDW 15.3*  --   --    NA  --  138 139   K  --  4.9 4.5   CL  --  101 104   CO2  --  13* 16*   BUN  --  29* 30*   CREATININE  --  2.5* 2.4*   GLU  --  520* 488*   PROT  --  7.6 6.7   ALBUMIN  --  3.1* 2.7*   BILITOT  --  0.5 0.5   AST  --  18 14   ALKPHOS  --  76 67   ALT  --  11 9*       Trended Cardiac Data:  Recent Labs   Lab " 11/22/24 1811 11/22/24 2245   TROPONINI 0.046* 0.560*       Microbiology Data:  Microbiology Results (last 7 days)       Procedure Component Value Units Date/Time    Blood culture x two cultures. Draw prior to antibiotics. [[id number]] Collected: 11/22/24 2244    Order Status: Sent Specimen: Blood from Peripheral, Hand, Right     Blood culture x two cultures. Draw prior to antibiotics. [[id number]] Collected: 11/22/24 2230    Order Status: Sent Specimen: Blood from Peripheral, Hand, Left              Other Results:  EKG (my interpretation): Sinus tachycardia at 138 bpm. No significant ST segment elevations or depressions appreciated. T wave inversion in lead III appreciated. Peaked T waves appreciated in leads V1 and V2.      Imaging:  Imaging Results              X-Ray Chest AP Portable (In process)                      CT Abdomen Pelvis  Without Contrast (Final result)  Result time 11/22/24 21:58:28      Final result by Che Singleton MD (11/22/24 21:58:28)                   Impression:      Findings compatible with perforated viscus with mild to moderate intraperitoneal free air and small amount of fluid more so in the upper abdomen and on the left.  There is a round air-filled structure possibly representing a duodenal diverticulum.  The possibility of peptic ulcer involving the adjacent gastric antrum is considered but it looks somewhat atypical for this diagnosis.    No other acute findings involving the bowel to suggest etiology or location of the ruptured viscus.  Appendix is normal.    No significant acute focal visceral lesions as imaged without IV contrast.    Prostatomegaly.    Severe L4-L5 spondylosis.    Please see above discussion for additional incidental findings.    Dr. Aby Prescott was notified at time of discovery via secure chat with acknowledgement of receipt.      Electronically signed by: Che Singleton  Date:    11/22/2024  Time:    21:58               Narrative:    EXAMINATION:  CT  ABDOMEN PELVIS WITHOUT CONTRAST    CLINICAL HISTORY:  Abdominal abscess/infection suspected;    TECHNIQUE:  Low dose axial images, sagittal and coronal reformations were obtained from the lung bases to the pubic symphysis, without contrast..    COMPARISON:  Lumbar spine CT 08/12/2008    FINDINGS:  The lack of intravenous contrast limits evaluation of the solid organs for focal lesions.    Heart: Normal in size. No pericardial effusion.    Lung Bases: There is mild dependent atelectasis.  No pleural effusion.    Bowel there is pneumoperitoneum most pronounced in the upper abdomen around the liver but also seen amongst the mesentery, in the gallbladder fossa.  Stomach proximally is unremarkable.  There is a focal air-filled bowel structure  along the gastric antrum and duodenum -axial images 41 through 57 series 2- which may represent a duodenal diverticulum.  Thinning of the gastric wall with a pre-pyloric peptic ulcer however is a possibility though it appears somewhat atypical for this.    There is a small amount of fluid tracking along the pericolic gutters more so on the left with a small amount in the pelvis and around the spleen.    Small bowel is unremarkable.  The appendix is normal.  Colon is unremarkable with no evidence of pneumatosis.  There is no bowel obstruction.  No convincing bowel mucosal thickening or transmural inflammation in the fat allowing for the free fluid.    Liver: Normal in size and attenuation, with no visualized focal hepatic lesions.    Gallbladder: Gallbladder is not significantly distended.  No evidence of cholecystolithiasis.    Bile Ducts: No evidence of dilated ducts.    Pancreas: No mass or peripancreatic fat stranding.    Spleen: Small granuloma.  No splenomegaly.    Adrenals: Unremarkable.    Kidneys/ Ureters: Renal cortices appear homogeneous.  No evidence of hydronephrosis or nephrolithiasis.  Mild nonspecific perinephric stranding in the fat.    Bladder: No evidence of wall  thickening.  Coarse calcification along the dome of the bladder dorsally appears to be outside the bladder along the wall and likely vascular.    Reproductive organs: Prostatomegaly    Retroperitoneum: No significant adenopathy.    Abdominal wall: Unremarkable.    Vasculature: There is moderate atherosclerosis of the aorta and more prominent atherosclerosis in the iliac arteries.  There is no evidence of aneurysm.    Bones: There is spondylosis of the spine.  Severe L4-L5 degenerative changes with vacuum disc phenomenon as well as significant endplate irregularity.  No evidence of fracture or subluxation.  Hemangioma at L2.                                    Assessment and Plan:     Al Anna is a 64 y.o. male with history of poorly-controlled T2DM c/b neuropathy and retinopathy, CKD-IIIb, HTN, HLD, and PAD c/b STI of L foot s/p partial amputation who presented to [facility] on 11/22/2024 with a primary complaint of sudden onset abdominal pain that started early this morning. He was found to be in DKA, and CT A/P is highly concerning for bowel perforation. Admitting to the ICU at this time with General Sx planning for ex-lap tonight.    Free Intraperitoneal Air c/f Bowel Perforation  Patient presented with complaint of acute onset of severe abdominal pain localized to upper abdomen that started at approximately 0430 this morning with accompanying vomiting and sweating. He is tachycardic but afebrile and normotensive. Initial LA 3.1 in ED. CT A/P demonstrated mild/moderate intraperitoneal free air and small volume of fluid mainly localized to left upper abdomen with possible duodenal diverticulum and peptic ulcer of gastric antrum also appreciated. Given NS 1.5 L and Ceftriaxone 2 g IV in ED as well as Dilaudid 0.5 mg which he states is keeping his pain mostly managed at this time.   Plan:  - Sx consulted in ED and planning to bring to OR for class A ex-lap tonight  - Strict NPO now  - Start  Cefepime 2 g IV q12h (renally dosed)  - Start Metronidazole 500 mg IV q8h  - Start Vancomycin  - Continue Dilaudid 0.5 mg q6h prn for pain control; can up-titrate as needed  - Continue to trend lactate    Poorly-controlled Diabetes Mellitus, Type II, w/o Insulin Use c/b Neuropathy, Retinopathy, and Current Diabetic Ketoacidosis  Patient presented with serum glucose of 520, HCO3 of 13 with anion gap of 24, and beta hydroxybutyrate of 2.3 all consistent with DKA. He denies any previous hx of DKA. Although his T2DM is not well controlled, his current DKA was likely induced by his suspected bowel perforation. Current home regimen includes Metformin 1000 mg bid, Actos 45 mg daily, Dapagliflozin 10 mg daily, and Gabapentin 300 mg qhs.  Plan:  - Start insulin gtt  - BMP q4h  - Accuchecks q1h  - Start LR w/ KCl 10 meq gtt at 150 mL/hr  - Plan to switch IVF to D5LR once serum glucose < 200  - Can resume home Gabapentin once cleared for PO intake    Acute Kidney Injury on Chronic Kidney Disease, Stage III-b  Patient has a hx of CKD with baseline Cr of 1.7-1.9 and GFR ~40 but presented with Cr of 2.5 and GFR of 28. Initial CrCl 45 mL/min. Etiology likely pre-renal.  Plan:  - Obtain urine Na and Cr to assess FENa  - Renally dose meds as indicated  - Avoid nephrotoxins as able  - Continue to monitor on labs  - Will plan to refer to Nephrology upon discharge to establish care    Elevated Troponin  Patient presented with initial troponin elevation at 0.046. Extremely low concern for ACS at this time as EKG is non-concerning and mild troponin elevation is most likely secondary to stress given significant abdominal pain.  Plan:  - Continue to trend until peak and down-trend  - Can consider Cardiology consult if becomes significantly elevated but not thought to be necessary at this time as elevation is likely stress induced    Peripheral Arterial Disease c/b Soft Tissue Infection of Left Foot s/p Partial Amputation and Arterial  Stenting of Left Leg  Patient underwent trans-metatarsal amputation of left foot and arterial stenting of left leg in 02/2020 after developing an infection and followed along for an extended course of time with Dr. Horner due to poor wound healing with long-term abx therapy. He is currently prescribed Rosuvastatin 5 mg daily.  Plan:  - Resume Rosuvastatin once cleared for PO intake  - Continue to  patient on importance of smoking cessation    Primary Hypertension  Current home regimen is Lisinopril-HCTZ 20-25 mg daily. BP stable upon admission.  Plan:  - Hold home Lisinopril-HCTZ for now as BP is normotensive but on the softer side in setting of likely bowel perforation  - Restart home meds as indicated  - Continue to routinely monitor vitals    Mixed Hyperlipidemia  Patient currently takes Rosuvastatin 5 mg daily at home.   Plan:  - Resume home Rosuvastatin once cleared for PO intake    Current Tobacco Use  Patient states that he started smoking at age 17 with an average of 1 ppd until recently cutting down to 0.5 ppd. He has an approximate 45 pack year hx but has never received LDCT Chest for lung cancer screening.  Plan:  - Continue to  patient on importance of smoking cessation  - Needs LDCT Chest as it has never been performed and he is an active smoker with significant pack year hx     Lumbar Radiculopathy  Patient currently prescribed Gabapentin 300 mg qhs and Tramadol 50 mg TID prn.  Plan:  - Can resume home Gabapentin once cleared for PO intake    Healthcare Maintenance  Plan:  - Influenza and COVID-19 immunizations are UTD  - Screening colonoscopy never performed but Cologuard negative on 12/09/23  - Patient unsure of Tdap status and unable to pull from [facility] registry  - Needs PCV-20  - Needs LDCT Chest as it has never been performed and he is an active smoker with significant pack year hx       Code Status: Full Code (confirmed with patient)  VTE Ppx: Lovenox  Diet: Strict NPO for  now    Disposition: Admitting to ICU for DKA and likely bowel perforation with plan to proceed to OR tonight for ex-lap    Estimated LOS: 3-5 days      Ranulfo Cook,   [facility], PGY-III  [employer] Medicine Night Float    [facility] Medicine Hospitalist Pager numbers:   [facility] Hospitalist Medicine Team A (Lorena/Hope): [phone]  [facility] Hospitalist Medicine Team B (Marvin/Radha):  [phone]

## 2024-11-23 NOTE — PROGRESS NOTES
[facility]  Intensive Care  General Surgery  Progress Note    Subjective:     History of Present Illness:  64M here with multiple days of abdominal pain. It has been much worse today. He has nausea and vomiting. Work-up revealed CT scan with free air.. He is also in DKA with surgar >500 initially. He has been started on an insulin ggt, given IV abx, and an IV PPI. He has never had abodminal surgery. He smokes 1/2 pack a day. He take naproxen but not every day. No evelyn or stroke. Walks around without issue. Independent.  Surgery consulted for free.     CT read: Findings compatible with perforated viscus with mild to moderate intraperitoneal free air and small amount of fluid more so in the upper abdomen and on the left. There is a round air-filled structure possibly representing a duodenal diverticulum. The possibility of peptic ulcer involving the adjacent gastric antrum is considered but it looks somewhat atypical for this diagnosis.     No white count but with left shift. . Elevated lactate. Elevated trops. LOW He is tachycardic to 129 but normal BP.     Post-Op Info:  Procedure(s) (LRB):  LAPAROTOMY, EXPLORATORY (N/A)   Day of Surgery     Interval History: Doing well. Soft pressure this am. Given fluids with some response. Elevated lactate, K, and Trops. Making urine with Cr improving. Pain controlled. Ng and drain benign. Sugars  much better.     Medications:  Continuous Infusions:   0.9% NaCl  1,000 mL Intravenous Continuous   Stopped at 11/23/24 0413    D5 and 0.45% NaCl   Intravenous Continuous PRN   Stopped at 11/23/24 0933    potassium chloride 15 mEq in dextrose 5% lactated ringers 1,007.5 mL infusion   Intravenous Continuous   Stopped at 11/23/24 1102     Scheduled Meds:   acetaminophen  1,000 mg Intravenous Q8H    ceFEPime IV (PEDS and ADULTS)  2 g Intravenous Q12H    enoxparin  40 mg Subcutaneous Q24H (prophylaxis, 1700)    fluconazole (DIFLUCAN) IV (PEDS and ADULTS)  400 mg Intravenous Q24H     metroNIDAZOLE IV (PEDS and ADULTS)  500 mg Intravenous Q8H    mupirocin   Nasal BID    pantoprazole  40 mg Intravenous BID    [START ON 11/24/2024] vancomycin (VANCOCIN) IV (PEDS and ADULTS)  1,750 mg Intravenous Q24H     PRN Meds:  Current Facility-Administered Medications:     dextrose 10%, 12.5 g, Intravenous, PRN    dextrose 10%, 25 g, Intravenous, PRN    D5 and 0.45% NaCl, , Intravenous, Continuous PRN    HYDROmorphone, 0.5 mg, Intravenous, Q3H PRN    ondansetron, 4 mg, Intravenous, Q6H PRN    sodium chloride 0.9%, 10 mL, Intravenous, PRN    Pharmacy to dose Vancomycin consult, , , Once **AND** vancomycin - pharmacy to dose, , Intravenous, pharmacy to manage frequency     Review of patient's allergies indicates:   Allergen Reactions    Penicillins Rash     Objective:     Vital Signs (Most Recent):  Temp: 98.5 °F (36.9 °C) (11/23/24 0715)  Pulse: 101 (11/23/24 1106)  Resp: 14 (11/23/24 1106)  BP: (!) 102/59 (11/23/24 1100)  SpO2: 100 % (11/23/24 1106) Vital Signs (24h Range):  Temp:  [96.2 °F (35.7 °C)-98.5 °F (36.9 °C)] 98.5 °F (36.9 °C)  Pulse:  [] 101  Resp:  [8-37] 14  SpO2:  [72 %-100 %] 100 %  BP: ()/(43-80) 102/59     Weight: 103 kg (227 lb 1.2 oz)  Body mass index is 28.01 kg/m².    Intake/Output - Last 3 Shifts         11/21 0700 11/22 0659 11/22 0700 11/23 0659 11/23 0700 11/24 0659    I.V. (mL/kg)  435.5 (4.2) 666.9 (6.5)    IV Piggyback  3808.2 1553.8    Total Intake(mL/kg)  4243.7 (41.2) 2220.8 (21.6)    Urine (mL/kg/hr)  1200 90 (0.2)    Drains  395     Total Output  1595 90    Net  +2648.7 +2130.8                    Physical Exam  Vitals reviewed.   Constitutional:       General: He is not in acute distress.  HENT:      Head: Normocephalic and atraumatic.      Nose: Nose normal.   Eyes:      General:         Right eye: No discharge.         Left eye: No discharge.   Cardiovascular:      Rate and Rhythm: Normal rate and regular rhythm.   Pulmonary:      Effort: Pulmonary effort is  normal. No respiratory distress.      Breath sounds: No stridor.   Abdominal:      Comments: Soft, ND  aTTP  Ng tube in place   Incisons with dressing in place  RUQ drain benign with SS output    Musculoskeletal:         General: Normal range of motion.      Cervical back: Normal range of motion.   Skin:     General: Skin is warm and dry.      Capillary Refill: Capillary refill takes 2 to 3 seconds.   Neurological:      General: No focal deficit present.      Mental Status: He is alert and oriented to person, place, and time.   Psychiatric:         Mood and Affect: Mood normal.         Behavior: Behavior normal.          Significant Labs:  I have reviewed all pertinent lab results within the past 24 hours.    Significant Diagnostics:  I have reviewed all pertinent imaging results/findings within the past 24 hours.  Assessment/Plan:     * Free intraperitoneal air  64M here with abdominal pain found to be in DKA and has CT scan with free air. Exam consistent with peritonitis. He is now s/p ex-lap with omental patch of gastric perforation on 11/23.     Continue ICU admission given soft pressure and DKA.   Avoid pressors if able. Fluid given this am with response.   Ng tube, NPO, mIVF for at least 3-4 days. Contrast study prior to NG removal.   Broad spec IV abx with Fluc. BID IV Protonix. H. Pylori ab  Trend trops and lactate. Echo. Consider discussing with cards.   Ottoniel improving but high K. Trend and repeat labs.   Continue strict glucose control for post-op ulcer healing and wound healing   Rest of care per ICU   DVT ppx   Please call with questions or worsening of condition .               Jourdan Avalos MD  General Surgery  [place] - Intensive Care

## 2024-11-23 NOTE — ANESTHESIA PREPROCEDURE EVALUATION
11/23/2024  Al Anna is a 64 y.o., male.      Pre-op Assessment    I have reviewed the Patient Summary Reports.     I have reviewed the Nursing Notes. I have reviewed the NPO Status.   I have reviewed the Medications.     Review of Systems  Anesthesia Hx:  No problems with previous Anesthesia               Denies Personal Hx of Anesthesia complications.                    Cardiovascular:  Exercise tolerance: good   Hypertension   CAD          PVD hyperlipidemia                               Pulmonary:  Pulmonary Normal                       Renal/:  Chronic Renal Disease, CKD                Hepatic/GI:     GERD   Acute abdomen  Taking GLP-1 Agonists            Neurological:    Neuromuscular Disease,                                   Endocrine:  Diabetes               Physical Exam  General: Cooperative, Alert and Oriented    Airway:  Mallampati: III / II  Mouth Opening: Normal  TM Distance: Normal  Tongue: Large  Neck ROM: Normal ROM    Dental:  Dentures    Heart:  Rate: Tachycardia        Anesthesia Plan  Type of Anesthesia, risks & benefits discussed:    Anesthesia Type: Gen ETT  Intra-op Monitoring Plan: Standard ASA Monitors  Post Op Pain Control Plan: multimodal analgesia and IV/PO Opioids PRN  Induction:  IV  Informed Consent: Informed consent signed with the Patient and all parties understand the risks and agree with anesthesia plan.  All questions answered.   ASA Score: 3 Emergent  Day of Surgery Review of History & Physical: H&P Update referred to the surgeon/provider.    Ready For Surgery From Anesthesia Perspective.     .

## 2024-11-23 NOTE — PLAN OF CARE
Problem: Physical Therapy  Goal: Physical Therapy Goal  Outcome: Met     Problem: Physical Therapy  Goal: Physical Therapy Goal  Description: Pt with no acute care PT needs  Outcome: Met

## 2024-11-23 NOTE — ANESTHESIA PROCEDURE NOTES
Intubation    Date/Time: 11/23/2024 12:55 AM    Performed by: Jeronimo Gonzales MD  Authorized by: Jeronimo Gonzales MD    Intubation:     Induction:  Rapid sequence induction    Intubated:  Postinduction    Mask Ventilation:  Not attempted    Attempts:  1    Attempted By:  Staff anesthesiologist    Method of Intubation:  Video laryngoscopy    Blade:  [place] 4    Laryngeal View Grade: Grade I - full view of cords      Difficult Airway Encountered?: No      Complications:  None    Airway Device:  Oral endotracheal tube    Airway Device Size:  7.5    Style/Cuff Inflation:  Cuffed    Tube secured:  23    Secured at:  The lips    Placement Verified By:  Capnometry    Complicating Factors:  None    Findings Post-Intubation:  BS equal bilateral and atraumatic/condition of teeth unchanged

## 2024-11-23 NOTE — PT/OT/SLP PROGRESS
Physical Therapy  Evaluation Attempt Note      Patient Name:  Al Anna   MRN:  [id number]    0905 - Patient not seen today secondary to on hold per nurse due to low BP (75/50; 74/52). Will follow-up at next scheduled visit.

## 2024-11-23 NOTE — HPI
64M here with multiple days of abdominal pain. It has been much worse today. He has nausea and vomiting. Work-up revealed CT scan with free air.. He is also in DKA with surgar >500 initially. He has been started on an insulin ggt, given IV abx, and an IV PPI. He has never had abodminal surgery. He smokes 1/2 pack a day. He take naproxen but not every day. No evelyn or stroke. Walks around without issue. Independent.  Surgery consulted for free.     CT read: Findings compatible with perforated viscus with mild to moderate intraperitoneal free air and small amount of fluid more so in the upper abdomen and on the left. There is a round air-filled structure possibly representing a duodenal diverticulum. The possibility of peptic ulcer involving the adjacent gastric antrum is considered but it looks somewhat atypical for this diagnosis.     No white count but with left shift. . Elevated lactate. Elevated trops. LOW He is tachycardic to 129 but normal BP.

## 2024-11-23 NOTE — CONSULTS
[facility] - Emergency Dept  General Surgery  Consult Note    Patient Name: Al Anna  MRN: [id number]  Code Status: Full Code  Admission Date: 11/22/2024  Hospital Length of Stay: 0 days  Attending Physician: Neil Garcia MD  Primary Care Provider: Smith Olmos MD    Patient information was obtained from patient, past medical records, and ER records.     Consults  Subjective:     Principal Problem: Free intraperitoneal air    History of Present Illness: 64M here with multiple days of abdominal pain. It has been much worse today. He has nausea and vomiting. Work-up revealed CT scan with free air.. He is also in DKA with surgar >500 initially. He has been started on an insulin ggt, given IV abx, and an IV PPI. He has never had abodminal surgery. He smokes 1/2 pack a day. He take naproxen but not every day. No evelyn or stroke. Walks around without issue. Independent.  Surgery consulted for free.     CT read: Findings compatible with perforated viscus with mild to moderate intraperitoneal free air and small amount of fluid more so in the upper abdomen and on the left. There is a round air-filled structure possibly representing a duodenal diverticulum. The possibility of peptic ulcer involving the adjacent gastric antrum is considered but it looks somewhat atypical for this diagnosis.     No white count but with left shift. . Elevated lactate. Elevated trops. LOW.  He is tachycardic to 129 but normal BP.     No current facility-administered medications on file prior to encounter.     Current Outpatient Medications on File Prior to Encounter   Medication Sig    acetaminophen (TYLENOL) 500 MG tablet Take 1,000 mg by mouth every 6 (six) hours as needed for Pain.    ciprofloxacin HCl (CIPRO) 500 MG tablet Take 1 tablet (500 mg total) by mouth every 12 (twelve) hours.    dapagliflozin propanediol (FARXIGA) 10 mg tablet TAKE 1 TABLET BY MOUTH ONCE DAILY FOR DIABETES    doxycycline (VIBRA-TABS) 100 MG tablet Take 1  tablet (100 mg total) by mouth 2 (two) times daily.    gabapentin (NEURONTIN) 100 MG capsule Take 3 capsules (300 mg total) by mouth every evening.    gentamicin (GARAMYCIN) 0.1 % ointment Apply topically 3 (three) times daily.    HYDROcodone-acetaminophen (NORCO) 5-325 mg per tablet Take 1 tablet by mouth every 6 (six) hours as needed for Pain.    ibuprofen (ADVIL,MOTRIN) 800 MG tablet Take 800 mg by mouth 3 (three) times daily.    levETIRAcetam (KEPPRA) 750 MG Tab Take 750 mg by mouth 2 (two) times daily.    lisinopriL-hydrochlorothiazide (PRINZIDE,ZESTORETIC) 20-25 mg Tab Take 1 tablet by mouth once daily    meclizine (ANTIVERT) 25 mg tablet TAKE 1 TABLET BY MOUTH THREE TIMES DAILY AS NEEDED FOR DIZZINESS    metFORMIN (FORTAMET) 500 mg 24hr tablet Take 2 tablets (1,000 mg total) by mouth 2 (two) times a day.    metFORMIN (GLUCOPHAGE-XR) 500 MG ER 24hr tablet Take 1,000 mg by mouth 2 (two) times daily.    mupirocin (BACTROBAN) 2 % ointment Apply topically 3 (three) times daily. Apply locally every other day    ondansetron (ZOFRAN-ODT) 4 MG TbDL Take 1 tablet (4 mg total) by mouth every 6 (six) hours as needed (Nausea).    pioglitazone (ACTOS) 45 MG tablet Take 1 tablet (45 mg total) by mouth once daily.    rosuvastatin (CRESTOR) 5 MG tablet Take 1 tablet by mouth once daily    SANTYL ointment Apply topically once daily. Apply locally evru other day.    semaglutide (OZEMPIC) 2 mg/dose (8 mg/3 mL) PnIj Inject 2 mg into the skin every 7 days.    traMADoL (ULTRAM) 50 mg tablet Take 1 tablet (50 mg total) by mouth 3 (three) times daily as needed for Pain.    traZODone (DESYREL) 100 MG tablet Take 1 tablet (100 mg total) by mouth every evening.    triamcinolone acetonide 0.1% (KENALOG) 0.1 % paste SMARTSIG:Patch(s) Topical 4 Times Daily       Review of patient's allergies indicates:   Allergen Reactions    Penicillins Rash       Past Medical History:   Diagnosis Date    Coronary artery disease     MI (myocardial  infarction)    Diabetes mellitus     Neuropathy - Retinopathy - PAD    Glaucoma     High cholesterol     Hypertension      Past Surgical History:   Procedure Laterality Date    DEBRIDEMENT OF ULCER WITH APPLICATION OF SKIN GRAFT Left 8/26/2022    Procedure: DEBRIDEMENT, ULCER, WITH SKIN GRAFT APPLICATION;  Surgeon: Dilip Horner MD;  Location: [facility] OR;  Service: General;  Laterality: Left;    FOOT AMPUTATION THROUGH METATARSAL Left 04/28/2020    REVISION, AMPUTATION, LOWER EXTREMITY Left 7/17/2023    Procedure: REVISION, AMPUTATION, LEFT TMA STUMP;  Surgeon: Dilip Horner MD;  Location: [facility] OR;  Service: General;  Laterality: Left;    WOUND DEBRIDEMENT Left 05/23/2022    Procedure: DEBRIDEMENT, WOUND;  Surgeon: Dilip Horner MD;  Location: [facility] OR;  Service: General;  Laterality: Left;    WOUND DEBRIDEMENT Left 7/17/2023    Procedure: DEBRIDEMENT, WOUND left tma stump;  Surgeon: Dilip Horner MD;  Location: [facility] OR;  Service: General;  Laterality: Left;     Family History       Problem Relation (Age of Onset)    Heart disease Father          Tobacco Use    Smoking status: Every Day     Current packs/day: 0.50     Average packs/day: 0.5 packs/day for 45.1 years (22.5 ttl pk-yrs)     Types: Cigarettes     Start date: 10/28/1979    Smokeless tobacco: Never   Substance and Sexual Activity    Alcohol use: Yes     Alcohol/week: 4.0 standard drinks of alcohol     Types: 4 Cans of beer per week    Drug use: No    Sexual activity: Not on file     Review of Systems   Gastrointestinal:  Positive for abdominal pain, nausea and vomiting.     Objective:     Vital Signs (Most Recent):  Temp: 97.5 °F (36.4 °C) (11/22/24 1518)  Pulse: (!) 129 (11/22/24 2306)  Resp: (!) 29 (11/22/24 2306)  BP: 118/67 (11/22/24 2306)  SpO2: 98 % (11/22/24 2306) Vital Signs (24h Range):  Temp:  [97.5 °F (36.4 °C)] 97.5 °F (36.4 °C)  Pulse:  [112-140] 129  Resp:  [20-37] 29  SpO2:  [94 %-100 %] 98 %  BP: (103-140)/(58-80)  118/67     Weight: 106.1 kg (234 lb)  Body mass index is 28.86 kg/m².     Physical Exam  Vitals reviewed.   Constitutional:       General: He is not in acute distress.  HENT:      Head: Normocephalic and atraumatic.      Nose: Nose normal.   Eyes:      General:         Right eye: No discharge.         Left eye: No discharge.   Cardiovascular:      Rate and Rhythm: Normal rate and regular rhythm.   Pulmonary:      Effort: Pulmonary effort is normal. No respiratory distress.      Breath sounds: No stridor.   Abdominal:      Comments: Soft, ND  Peritonitic    Musculoskeletal:         General: Normal range of motion.      Cervical back: Normal range of motion.   Skin:     General: Skin is warm and dry.      Capillary Refill: Capillary refill takes 2 to 3 seconds.   Neurological:      General: No focal deficit present.      Mental Status: He is alert and oriented to person, place, and time.   Psychiatric:         Mood and Affect: Mood normal.         Behavior: Behavior normal.            I have reviewed all pertinent lab results within the past 24 hours.    Significant Diagnostics:  I have reviewed all pertinent imaging results/findings within the past 24 hours.    Assessment/Plan:     * Free intraperitoneal air  64M here with abdominal pain found to be in DKA and has CT scan with free air. Exam consistent with peritonitis. He is tachycardic with normal blood pressure.     To OR for class A ex-lap. Mostly concerned about perforated gastric/duodenal ulcer.   Discussed  with medicine. Will admit to ICU for DKA. Needs very good glucose control post-op   Broad Spec IV abx and IV PPI  Strict NPO. IVF. NG tube likely after surgery.   Fluid resuscitation   Trend lactate.   Elevated trops. Trend and discuss with cards. Likely stress induced. EKG. Echo in am.   DVT ppx   Please call with questions.             VTE Risk Mitigation (From admission, onward)           Ordered     IP VTE LOW RISK PATIENT  Once         11/22/24 1952      Place sequential compression device  Until discontinued         11/22/24 1952                    Thank you for your consult. I will follow-up with patient. Please contact us if you have any additional questions.    Jourdan Avalos MD  General Surgery  [facility] - Emergency Dept

## 2024-11-23 NOTE — OP NOTE
General Surgery  Operative Note    DATE: 11/23/2024    PREOPERATIVE DIAGNOSIS: Perforated abdominal viscus [R19.8]     POSTOPERATIVE DIAGNOSIS: Perforated gastric ulcer      Procedure(s):  LAPAROTOMY, EXPLORATORY   Repair of perforated gastric ulcer  Omental patch     Surgeons and Role:     * Al Carrion MD - Primary  Jourdan Avalos MD  ANESTHESIA: General endotracheal anesthesia    FINDINGS:   1.5 cm perforated gastric ulcer pre-pyloric. Moderate contamination. Repaired with modified aashish patch. 19 Fr cosme drain placed.     INDICATION: Mr. Anna is a 64 y.o. male was seen by general surgery and found to have free air. Based on this surgery was indicated and we recommended exploratory laparotomy. .  We discussed the procedure including postoperative course. he agreed to proceed. Mr. Anna signed the informed consent and expressed understanding of the risks and benefits of surgery.     PROCEDURE IN DETAIL: Patient was brought to the operating room where he was placed in supine position on the operating room table and underwent general anesthesia with endotracheal intubation without complication. A weaver was placed. The field was sterilely prepped out and draped in standard fashion, and a timeout identifying the correct patient, placement, procedure, and preoperative antibiotics was called with everyone in agreement.    Local anesthetic was applied and a upper midline incision was made using a #15 blade. Electrocautery was used to access the fascia. The fascia was elevated and abdomen was entered sharply. We were met with succus consistent with gastric perforation. This was cultured. We irrigated the abdomen profusely at this point. We then explored the stomach and just prior to the pylorus we encountered a 1.5 cm perforated gastric ulcer. We used 2-0 silk sutures in an interrupted fashion to re-approximate the defect. This was followed by laying a tongue of omentum over the defect in between the remaining  Patient to be notified at upcoming appointment        tails of suture that were previously tied. These were tied down  to secure the omental patch. There was no tension on the omental patch and it was in good position. A 19Fr cosme drain was placed overlying the patch and was tunneled to the RUQ. This was secured with silk suture      Fascia was closed with  #1 PDS, the incisions was irrigated, and skin was closed with staples. Sterile dressing applied.       This completed the proposed operation. All instruments, needles, and sponge counts were reported as correct x2 by the nursing staff. Patient was extubated and awakened from general anesthesia without complication. He was sent to the recovery unit in stable condition.     Dr. Carrion present for the entire case     EBL: <10mL.    COMPLICATIONS: none apparent.    SPECIMEN: None    DRAINS: 19Fr cosme drain    DISPOSITION: PACU.      Jourdan Avalos, PGY-4

## 2024-11-23 NOTE — PROGRESS NOTES
"[facility] Medicine Progress Note    Primary Team: [facility] Hospitalist Team B   Attending Physician: Neil Garcia MD  Resident: Sydni  Intern: Lila    Subjective:      Patient tolerated surgery well last night, reports pain is much improved from 10/10 to 4/10 this morning. Denies any other symptoms at this time.      Objective:     Last 24 Hour Vital Signs:  BP  Min: 77/51  Max: 140/78  Temp  Av.6 °F (35.9 °C)  Min: 96.2 °F (35.7 °C)  Max: 97.5 °F (36.4 °C)  Pulse  Av.9  Min: 99  Max: 140  Resp  Av.2  Min: 18  Max: 37  SpO2  Av.9 %  Min: 94 %  Max: 100 %  Height  Av' 3.5" (191.8 cm)  Min: 6' 3.5" (191.8 cm)  Max: 6' 3.5" (191.8 cm)  Weight  Av kg (229 lb 6.1 oz)  Min: 103 kg (227 lb 1.2 oz)  Max: 106.1 kg (234 lb)  No intake/output data recorded.    Physical Examination:  Constitutional: Awake, alert, no acute distress, SUMP in place in left nare  Neurological: Alert and oriented, no focal deficits  Head/Neck: Full range of motion, no tenderness or stiffness  Cardiovascular: Regular rate and rhythm, no murmur  Pulmonary: Lungs clear, equal breath sounds  Abdominal: Soft, non tender, normal bowel sounds  Musculoskeletal: Full range of motion, no edema    Laboratory:  Laboratory Data Reviewed:   Pertinent Findings:  WBC 8.22  Hgb 13.9  Plt 223  Na 145  Cl 112  K 3.5  CO2 18  AG 15  BUN 32  Creatinine 2.4  Glucose 179  Lactic Acid 4.1  Urine studies pending    Microbiology Data Reviewed:   Pertinent Findings:  Abdominal surgery cultures pending  Blood cultures pending    Other Results:  EKG (my interpretation):   Sinus tachycardia, rate 138. No ST elevations. Possible Left atrial enlargement    Radiology Data Reviewed:   Pertinent Findings:  CT ABD/PELVIS :  Impression:     Findings compatible with perforated viscus with mild to moderate intraperitoneal free air and small amount of fluid more so in the upper abdomen and on the left.  There is a round air-filled structure " possibly representing a duodenal diverticulum.  The possibility of peptic ulcer involving the adjacent gastric antrum is considered but it looks somewhat atypical for this diagnosis.     No other acute findings involving the bowel to suggest etiology or location of the ruptured viscus.  Appendix is normal.     No significant acute focal visceral lesions as imaged without IV contrast.     Prostatomegaly.     Severe L4-L5 spondylosis. Hemangioma at L2.     CXR 11/22:  Impression:     Free air beneath the diaphragm again noted as described on CT.     No appreciable acute lung parenchymal or pleural abnormality as imaged.    Current Medications:     Infusions:   0.9% NaCl  1,000 mL Intravenous Continuous   Stopped at 11/23/24 0416    D5 and 0.45% NaCl   Intravenous Continuous  mL/hr at 11/23/24 0417 New Bag at 11/23/24 0417    insulin regular 1 units/mL infusion orderable (DKA)  0-0.2 Units/kg/hr Intravenous Continuous 5.31 mL/hr at 11/23/24 0410 0.05 Units/kg/hr at 11/23/24 0410    potassium chloride 15 mEq in dextrose 5% lactated ringers 1,007.5 mL infusion   Intravenous Continuous            Scheduled:   acetaminophen  1,000 mg Intravenous Q8H    ceFEPime IV (PEDS and ADULTS)  2 g Intravenous Q12H    fluconazole (DIFLUCAN) IV (PEDS and ADULTS)  400 mg Intravenous Q24H    heparin (porcine)  5,000 Units Subcutaneous Q8H    insulin regular  0.1 Units/kg Intravenous Once    metroNIDAZOLE IV (PEDS and ADULTS)  500 mg Intravenous Q8H    mupirocin   Nasal BID    pantoprazole  40 mg Intravenous BID    potassium chloride  10 mEq Intravenous Q1H    vancomycin (VANCOCIN) IV (PEDS and ADULTS)  1,000 mg Intravenous Once    [START ON 11/24/2024] vancomycin (VANCOCIN) IV (PEDS and ADULTS)  1,750 mg Intravenous Q24H        PRN:    Current Facility-Administered Medications:     dextrose 10%, 12.5 g, Intravenous, PRN    dextrose 10%, 25 g, Intravenous, PRN    D5 and 0.45% NaCl, , Intravenous, Continuous PRN    HYDROmorphone,  0.5 mg, Intravenous, Q3H PRN    ondansetron, 4 mg, Intravenous, Q6H PRN    sodium chloride 0.9%, 10 mL, Intravenous, PRN    Pharmacy to dose Vancomycin consult, , , Once **AND** vancomycin - pharmacy to dose, , Intravenous, pharmacy to manage frequency    Antibiotics and Day Number of Therapy:  Rocephin 11/22  Cefepime 11/23-present  Diflucan 11/23-present  Metronidazole 11/23-present  Vanc 11/23    Lines and Day Number of Therapy:  PIV x2 11/22  Jose 11/23    Assessment/Plan:   Al Anna is a 64 y.o. male with history of poorly-controlled T2DM c/b neuropathy and retinopathy, CKD-IIIb, HTN, HLD, and PAD c/b STI of L foot s/p partial amputation who presented to [facility] on 11/22/2024 with a primary complaint of sudden onset abdominal pain that started early yesterday morning. He was found to be in DKA, with CT A/P  highly concerning for bowel perforation. Admitted to the ICU s/p ex lap with gastric perforation repair.     Perforated gastric ulcer s/p omental patch repair  Patient presented with complaint of acute onset of severe abdominal pain localized to upper abdomen that started at approximately 0430 morning of presentation with accompanying vomiting and sweating. He was tachycardic but afebrile and normotensive. Initial LA 3.1 in ED. CT A/P demonstrated mild/moderate intraperitoneal free air and small volume of fluid mainly localized to left upper abdomen with possible duodenal diverticulum and peptic ulcer of gastric antrum also appreciated. Given NS 1.5 L and Ceftriaxone 2 g IV in ED as well as Dilaudid 0.5 mg  Plan:  - strict NPO with NG tube to suction  - continue Cefepime 2 g IV q12h (renally dosed)  - continue Metronidazole 500 mg IV q8h  - continue Vancomycin  - Continue Dilaudid 0.5 mg q6h prn for pain control; can up-titrate as needed  - will start TPN tomorrow since patient will have prolonged course of being NPO  - nutrition consulted to assist with TPN     Poorly-controlled  Diabetes Mellitus, Type II, w/o Insulin Use c/b Neuropathy, Retinopathy, and Current Diabetic Ketoacidosis, improving  Patient presented with serum glucose of 520, HCO3 of 13 with anion gap of 24, and beta hydroxybutyrate of 2.3 all consistent with DKA. He denies any previous hx of DKA. Although his T2DM is not well controlled, his current DKA was likely induced by his suspected bowel perforation. Current home regimen includes Metformin 1000 mg bid, Actos 45 mg daily, Dapagliflozin 10 mg daily, and Gabapentin 300 mg qhs.  Plan:  - continue insulin gtt until anion   - BMP q4h  - Accuchecks q1h  - Acutely hyperkalemic, shifting as indicated and Q4 K checks  - Can resume home Gabapentin once cleared for PO intake     Acute Kidney Injury on Chronic Kidney Disease, Stage III-b  Hyperkalemia  Patient has a hx of CKD with baseline Cr of 1.7-1.9 and GFR ~40 but presented with Cr of 2.5 and GFR of 28. Initial CrCl 45 mL/min. Etiology likely pre-renal.  Plan:  - FENa 0.2 indicating pre renal etiology, creatinine improving  - Renally dose meds as indicated  - Avoid nephrotoxins as able  - Continue to monitor on labs  - Will plan to refer to Nephrology upon discharge to establish care  - hyperkalemia multifactorial in the setting of LOW, DKA, NG tube to suction which are all affecting electrolytes, continue to monitor with Q4 BMPs     Elevated Troponin  Patient presented with initial troponin elevation at 0.046. Extremely low concern for ACS at this time as EKG is non-concerning and mild troponin elevation is most likely secondary to stress given significant abdominal pain.  Plan:  - peaked at 1.160, likely stress induced in the setting of bowel perforation  - continue to monitor for chest pain and EKG changes on tele     Peripheral Arterial Disease c/b Soft Tissue Infection of Left Foot s/p Partial Amputation and Arterial Stenting of Left Leg  Patient underwent trans-metatarsal amputation of left foot and arterial stenting of  left leg in 02/2020 after developing an infection and followed along for an extended course of time with Dr. Horner due to poor wound healing with long-term abx therapy. He is currently prescribed Rosuvastatin 5 mg daily.  Plan:  - Resume Rosuvastatin once cleared for PO intake  - Continue to  patient on importance of smoking cessation     Primary Hypertension  Current home regimen is Lisinopril-HCTZ 20-25 mg daily. BP stable upon admission.  Plan:  - Hold home Lisinopril-HCTZ for now as BP is normotensive but on the softer side in setting of likely bowel perforation  - Restart home meds as indicated  - Continue to routinely monitor vitals     Mixed Hyperlipidemia  Patient currently takes Rosuvastatin 5 mg daily at home.   Plan:  - Resume home Rosuvastatin once cleared for PO intake     Current Tobacco Use  Patient states that he started smoking at age 17 with an average of 1 ppd until recently cutting down to 0.5 ppd. He has an approximate 45 pack year hx but has never received LDCT Chest for lung cancer screening.  Plan:  - Continue to  patient on importance of smoking cessation  - Needs LDCT Chest as it has never been performed and he is an active smoker with significant pack year hx      Lumbar Radiculopathy  Patient currently prescribed Gabapentin 300 mg qhs and Tramadol 50 mg TID prn.  Plan:  - Can resume home Gabapentin once cleared for PO intake     Healthcare Maintenance  Plan:  - Influenza and COVID-19 immunizations are UTD  - Screening colonoscopy never performed but Cologuard negative on 12/09/23  - Patient unsure of Tdap status and unable to pull from [facility] registry  - Needs PCV-20  - Needs LDCT Chest as it has never been performed and he is an active smoker with significant pack year hx      Code Status: Full Code (confirmed with patient)  VTE Ppx: Lovenox  Diet: Strict NPO, start TPN tomorrow     Disposition: still requiring ICU level care for DKA     Estimated LOS: 3-5 days    Herlinda  MD Sydni  [facility] Internal Medicine/Pediatrics HO-2    [facility] Medicine Hospitalist Pager numbers:   [facility] Hospitalist Medicine Team A (Lorena/Hope): [phone]  [facility] Hospitalist Medicine Team B (Marvin/Radha):  [phone]

## 2024-11-23 NOTE — PT/OT/SLP PROGRESS
Occupational Therapy      Patient Name:  Al Anna   MRN:  [id number]    0905 - Patient not seen today secondary to on hold per nurse due to low BP (75/50; 74/52). Will follow-up at next scheduled visit.    Amie Gentile OT  11/23/2024

## 2024-11-23 NOTE — EICU
Brief Eicu admission review note.               H&P,  notes,labs images reviewed.       Briefly 65 y/o male with h/o DMII, CKD, HTN, PAD presented with c/o sever abdominal pain, found to be in DKA , found to have perforated viscus on CT, s/p exp lap for perforated gastric ucler today.    Camera exam:     98,  90/60, 100 % RA     Problems:   S/p exp lap for perforated   pre-pyloric gastric ulcer   DKA   LOW  Minimally elevated trop, likely demand  ischemia    Plan:    On Cefepime/Vanc/Fluconazole   PPI   Insulin qtt, IVF, BMP Q4h per DKA protocol   Dilaudid prn for pain  UA        SUP: PPI   DVT proph: heparin sq

## 2024-11-23 NOTE — SUBJECTIVE & OBJECTIVE
No current facility-administered medications on file prior to encounter.     Current Outpatient Medications on File Prior to Encounter   Medication Sig    acetaminophen (TYLENOL) 500 MG tablet Take 1,000 mg by mouth every 6 (six) hours as needed for Pain.    ciprofloxacin HCl (CIPRO) 500 MG tablet Take 1 tablet (500 mg total) by mouth every 12 (twelve) hours.    dapagliflozin propanediol (FARXIGA) 10 mg tablet TAKE 1 TABLET BY MOUTH ONCE DAILY FOR DIABETES    doxycycline (VIBRA-TABS) 100 MG tablet Take 1 tablet (100 mg total) by mouth 2 (two) times daily.    gabapentin (NEURONTIN) 100 MG capsule Take 3 capsules (300 mg total) by mouth every evening.    gentamicin (GARAMYCIN) 0.1 % ointment Apply topically 3 (three) times daily.    HYDROcodone-acetaminophen (NORCO) 5-325 mg per tablet Take 1 tablet by mouth every 6 (six) hours as needed for Pain.    ibuprofen (ADVIL,MOTRIN) 800 MG tablet Take 800 mg by mouth 3 (three) times daily.    levETIRAcetam (KEPPRA) 750 MG Tab Take 750 mg by mouth 2 (two) times daily.    lisinopriL-hydrochlorothiazide (PRINZIDE,ZESTORETIC) 20-25 mg Tab Take 1 tablet by mouth once daily    meclizine (ANTIVERT) 25 mg tablet TAKE 1 TABLET BY MOUTH THREE TIMES DAILY AS NEEDED FOR DIZZINESS    metFORMIN (FORTAMET) 500 mg 24hr tablet Take 2 tablets (1,000 mg total) by mouth 2 (two) times a day.    metFORMIN (GLUCOPHAGE-XR) 500 MG ER 24hr tablet Take 1,000 mg by mouth 2 (two) times daily.    mupirocin (BACTROBAN) 2 % ointment Apply topically 3 (three) times daily. Apply locally every other day    ondansetron (ZOFRAN-ODT) 4 MG TbDL Take 1 tablet (4 mg total) by mouth every 6 (six) hours as needed (Nausea).    pioglitazone (ACTOS) 45 MG tablet Take 1 tablet (45 mg total) by mouth once daily.    rosuvastatin (CRESTOR) 5 MG tablet Take 1 tablet by mouth once daily    SANTYL ointment Apply topically once daily. Apply locally evru other day.    semaglutide (OZEMPIC) 2 mg/dose (8 mg/3 mL) PnIj Inject 2 mg  into the skin every 7 days.    traMADoL (ULTRAM) 50 mg tablet Take 1 tablet (50 mg total) by mouth 3 (three) times daily as needed for Pain.    traZODone (DESYREL) 100 MG tablet Take 1 tablet (100 mg total) by mouth every evening.    triamcinolone acetonide 0.1% (KENALOG) 0.1 % paste SMARTSIG:Patch(s) Topical 4 Times Daily       Review of patient's allergies indicates:   Allergen Reactions    Penicillins Rash       Past Medical History:   Diagnosis Date    Coronary artery disease     MI (myocardial infarction)    Diabetes mellitus     Neuropathy - Retinopathy - PAD    Glaucoma     High cholesterol     Hypertension      Past Surgical History:   Procedure Laterality Date    DEBRIDEMENT OF ULCER WITH APPLICATION OF SKIN GRAFT Left 8/26/2022    Procedure: DEBRIDEMENT, ULCER, WITH SKIN GRAFT APPLICATION;  Surgeon: Dilip Horner MD;  Location: [facility] OR;  Service: General;  Laterality: Left;    FOOT AMPUTATION THROUGH METATARSAL Left 04/28/2020    REVISION, AMPUTATION, LOWER EXTREMITY Left 7/17/2023    Procedure: REVISION, AMPUTATION, LEFT TMA STUMP;  Surgeon: Dilip Horner MD;  Location: [facility] OR;  Service: General;  Laterality: Left;    WOUND DEBRIDEMENT Left 05/23/2022    Procedure: DEBRIDEMENT, WOUND;  Surgeon: Dilip Horner MD;  Location: [facility] OR;  Service: General;  Laterality: Left;    WOUND DEBRIDEMENT Left 7/17/2023    Procedure: DEBRIDEMENT, WOUND left tma stump;  Surgeon: Dilip Horner MD;  Location: [facility] OR;  Service: General;  Laterality: Left;     Family History       Problem Relation (Age of Onset)    Heart disease Father          Tobacco Use    Smoking status: Every Day     Current packs/day: 0.50     Average packs/day: 0.5 packs/day for 45.1 years (22.5 ttl pk-yrs)     Types: Cigarettes     Start date: 10/28/1979    Smokeless tobacco: Never   Substance and Sexual Activity    Alcohol use: Yes     Alcohol/week: 4.0 standard drinks of alcohol     Types: 4 Cans of beer per week     Drug use: No    Sexual activity: Not on file     Review of Systems   Gastrointestinal:  Positive for abdominal pain, nausea and vomiting.     Objective:     Vital Signs (Most Recent):  Temp: 97.5 °F (36.4 °C) (11/22/24 1518)  Pulse: (!) 129 (11/22/24 2306)  Resp: (!) 29 (11/22/24 2306)  BP: 118/67 (11/22/24 2306)  SpO2: 98 % (11/22/24 2306) Vital Signs (24h Range):  Temp:  [97.5 °F (36.4 °C)] 97.5 °F (36.4 °C)  Pulse:  [112-140] 129  Resp:  [20-37] 29  SpO2:  [94 %-100 %] 98 %  BP: (103-140)/(58-80) 118/67     Weight: 106.1 kg (234 lb)  Body mass index is 28.86 kg/m².     Physical Exam  Vitals reviewed.   Constitutional:       General: He is not in acute distress.  HENT:      Head: Normocephalic and atraumatic.      Nose: Nose normal.   Eyes:      General:         Right eye: No discharge.         Left eye: No discharge.   Cardiovascular:      Rate and Rhythm: Normal rate and regular rhythm.   Pulmonary:      Effort: Pulmonary effort is normal. No respiratory distress.      Breath sounds: No stridor.   Abdominal:      Comments: Soft, ND  Peritonitic    Musculoskeletal:         General: Normal range of motion.      Cervical back: Normal range of motion.   Skin:     General: Skin is warm and dry.      Capillary Refill: Capillary refill takes 2 to 3 seconds.   Neurological:      General: No focal deficit present.      Mental Status: He is alert and oriented to person, place, and time.   Psychiatric:         Mood and Affect: Mood normal.         Behavior: Behavior normal.            I have reviewed all pertinent lab results within the past 24 hours.    Significant Diagnostics:  I have reviewed all pertinent imaging results/findings within the past 24 hours.

## 2024-11-23 NOTE — ASSESSMENT & PLAN NOTE
64M here with abdominal pain found to be in DKA and has CT scan with free air. Exam consistent with peritonitis. He is tachycardic with normal blood pressure.     To OR for class A ex-lap. Mostly concerned about perforated gastric/duodenal ulcer.   Discussed  with medicine. Will admit to ICU for DKA. Needs very good glucose control post-op   Broad Spec IV abx and IV PPI  Strict NPO. IVF. NG tube likely after surgery.   Fluid resuscitation   Elevated trops. Trend and discuss with cards. Echo in am.   DVT ppx   Please call with questions.

## 2024-11-23 NOTE — PROGRESS NOTES
This is an attestation of a separate note by the ICU resident/fellow. As the teaching physician, I saw the patient with the resident/fellow and agree with the resident/fellow's findings and plan. In addition to the resident/fellow's documentation, I add the following:       Mr. Anna is a 64 year old male with PMH of poorly controlled DM2, CKD3, Htn, PAD s/p partial foot amputation who presented for abdominal pain found to have perforated ulcer s/p ex lap and admitted to ICU for DKA.    11/23: Pain well controlled post op, just is hungry. Extensive smoking history - would offer chantix at discharge. Needs better OP diabetes management - insulin? Needs long acting insulin. A little worried about his uptrending lactic - continue to trend this until it downtrends. Potassium up to 6.6 per RN was drawn downstream of potassium infusion but will pause fluids pending recheck.       ICU Checklist:    Feeding: Ok to eat from our standpoint when ok per surgery   Analgesia: Has PRN dilaudid  Sedation: None  Thromboprophylaxis: Change SQH to lovenox  HOB: Elevated   Ulcer ppx: IV PPI for PUD  Glucose: Managing per DKA protocol  SAT/SBT: Not indicated  Bowel: Monitor  Indwelling lines: PIV x 3, weaver (discontinue)   De-escalation of abx: Can likely stop vanc but continue cefepime/flagyl   Fluid balance: +4.7L   PT/OT: Consulted   GOC/Family discussion: FC   Dispo: ICU for now     Lianna Mcclure MD  [facility] Attending  Cell: [phone]  11/23/2024 10:57 AM    Critical Care time was spent validating the history and physical exam, reviewing the lab and imaging results, and discussing the care of the patient with the bedside nurse and the patient and/or surrogates. This critical care time did not overlap with that of any other provider or involve time for any procedures.  This patient has a high probability of sudden clinically significant deterioration which requires the highest level of physician preparedness to intervene  urgently. I managed/supervised life or organ supporting interventions that required frequent physician assessments. I devoted my full attention in the ICU to the direct care of this patient for this period of time. Organ systems which are failing and require intensive, critical care support are: GI, cards, pulm  Critical Care time: 105 minutes

## 2024-11-23 NOTE — ASSESSMENT & PLAN NOTE
64M here with abdominal pain found to be in DKA and has CT scan with free air. Exam consistent with peritonitis. He is now s/p ex-lap with omental patch of gastric perforation on 11/23.     Continue ICU admission given soft pressure and DKA.   Avoid pressors if able. Fluid given this am with response.   Ng tube, NPO, mIVF for at least 3-4 days. Contrast study prior to NG removal.   Broad spec IV abx with Fluc. BID IV Protonix. H. Pylori ab  Trend trops and lactate. Echo. Consider discussing with cards.   Ottoniel improving but high K. Trend and repeat labs.   Continue strict glucose control for post-op ulcer healing and wound healing   Rest of care per ICU   DVT ppx   Please call with questions or worsening of condition .

## 2024-11-23 NOTE — CONSULTS
[facility] Family Medicine Progress Note  ICU    Patient Name: Al Anna  MRN: [id number]  Admit Date: 11/22/2024  Today's Date: 11/23/2024  Attending Physician: Dr. Lianna Mcclure      SUBJECTIVE:     HPI: 65 yo M with PMHx uncontrolled D2M, CKDIIIb, HTN, PAD s/p L foot partial amputation presented with 3 day history of LUQ abdominal pain acutely worsening  morning of admission with associated vomiting. CT A & P with intraperitoneal free air, lactic acidosis (3.1). Patient s/p ex lap 11/23 in which free air attributed to perorated gastric ulcer.  Patient also found to be in DKA. Admitted to ICU for closer monitoring following ex lap, insulin drip.    Interval History: Patient evaluated at bedside, reports he is feeling much better and states his pain is a 1/10. States he is very thirsty. Hemodynamically stable.       Review of Systems   Constitutional:  Negative for chills and fever.   Gastrointestinal:  Positive for abdominal pain. Negative for diarrhea, nausea and vomiting.       OBJECTIVE:     Vital Signs Trends/Hx Reviewed  Vitals:    11/23/24 0701 11/23/24 0706 11/23/24 0708 11/23/24 0715   BP: (!) 69/46 (!) 70/48 (!) 75/47 (!) 77/50   Pulse: (!) 114 (!) 117 (!) 113 (!) 113   Resp: 20 (!) 21 (!) 22 20   Temp:       TempSrc:       SpO2: 100% 100% 100% 97%   Weight:       Height:                    Physical Exam  Constitutional:       General: He is not in acute distress.     Appearance: Normal appearance. He is not ill-appearing or diaphoretic.   HENT:      Head: Normocephalic and atraumatic.      Mouth/Throat:      Mouth: Mucous membranes are dry.   Eyes:      Extraocular Movements: Extraocular movements intact.   Cardiovascular:      Rate and Rhythm: Regular rhythm. Tachycardia present.      Pulses: Normal pulses.      Heart sounds: Normal heart sounds.   Pulmonary:      Effort: Pulmonary effort is normal. No respiratory distress.      Breath sounds: Normal breath sounds. No wheezing.   Abdominal:      General:  Abdomen is flat.      Palpations: Abdomen is soft.      Comments: Surgical bandage over abdomen, CDI   Musculoskeletal:      Cervical back: Normal range of motion and neck supple.      Right lower leg: No edema.      Left lower leg: No edema.   Skin:     General: Skin is warm.   Neurological:      General: No focal deficit present.      Mental Status: He is alert and oriented to person, place, and time.   Psychiatric:         Mood and Affect: Mood normal.         Behavior: Behavior normal.       Laboratory:  Recent Labs   Lab 11/23/24  0453   WBC 8.22  8.22   RBC 4.94  4.94   HGB 13.9*  13.9*   HCT 42.2  42.2     223   MCV 85  85   MCH 28.1  28.1   MCHC 32.9  32.9     Recent Labs   Lab 11/23/24  0453     145   K 3.5  3.5   *  112*   CO2 18*  18*   BUN 32*  32*   CREATININE 2.4*  2.4*   CALCIUM 9.0  9.0   MG 2.0     Recent Labs   Lab 11/22/24  1940   PH 7.260*   PCO2 40.6   PO2 24.9*   HCO3 18.2*   POCSATURATED 35.5*       Imaging:   CT A & P: Findings compatible with perforated viscus with mild to moderate intraperitoneal free air and small amount of fluid more so in the upper abdomen and on the left.      CXR with free air beneath diaphragm    ASSESSMENT   Mr. Anna is a 64 y.o. male who has a past medical history of Coronary artery disease, Diabetes mellitus, Glaucoma, High cholesterol, and Hypertension. Admitted for DKA, viscus perforation s/p ex lap    PLAN     Neuro/Psych  - AAOx3  -Dialudid PRN     CV  BP 90s/50s, HR 100s, MAPs >65  Troponin Wnl    #HTN  Hold lisinopril-HCTZ in setting of soft pressures     Pulm  Satting 99 on RA, no acute concerns    #Tobacco Use  45 pack year history  LDCT due, arrange outpatient upon discharge  - Tobacco cessation counseling, recommend chantix if patient agreeable    FEN/GI  F: D51/2NS  E: K 3.5, phos 3.6, Mg 2  N: NPO    K 6.6, repeat pending, suspect lab error however holding K in interim    #Perforated viscus s/p ex lap  #Perforated  gastric ulcer  Patient reports increased aleve intake over last month. Denies alcohol use, currently smokes.  CT A & P with intraperitoneal free air, lactic acidosis (3.1-->4.1).  - pain control  - cefepime, flagyl  - diet per surgery, NPO for 3-4 days followed by CT contrast study, if no leak advance feeds slowly  - recommend TPN in interim    RENAL  UOP: 55cc , In: +1.7L, net +4L in last 24 hrs    #LOW on CKD IIIB  Prerenal etiology based on urine lytes   BUN/Cr: 33/2.4, baseline 1.7L  Improved to Cr 2  Continue to monitor renal status and urine output     #Lactic acidosis  3.1 --> 4.1 --> 1.9    Heme  H/H 16 on admission, 13.9/42.2; stable  WBC 8.22  DVT prophylaxis: Heparin (prophylactic)    Endo  #Uncontrolled D2M  #DKA  Hx of uncontrolled D2M, Hb1C 10.7 2 months prior on pioglitazone, dapaglifolozin, metformin  No hx of insulin use  Patient with significantly elevated BG, acidosis, anion gap, evidence of ketones  Insulin drip per DKA protocol  Wean insulin drip as able and transition to SQ Insulin per protocol  - Blood Glucose 520 on admission, bicarb 13, K 3.5  - BHB 2.3, UA w/ negative+ ketones and 4+ glucose.  - D5 LR with 10mEq K at 125 mL/hr as BG <200  - Replete K PRN for goal serum K 4-5 mEq/L  - Follow w/ BMP, Mg, and phos q4h and POCT glucose q1h  - Stop insulin drip once AG closes and bicarb >17 and transition to subQ insulin regimen   - In addition, 2 hours after administering LA, please discontinue IV insulin infusion  - Continue close monitoring and supportive care   - Diet once cleared by surgery    ID  Afebrile, no leukocytosis  Discontinue vancomycin  Continue cefepime, flagyl for intraabdominal coverage    PPx  Feeding: per surgery, NPO  Analgesic: dilaudid PRN  Sedation: none  Thromboprophylaxis: Lovenox  Head of bed elevated: >30 degrees  Ulcer prophylaxis: protonix  Glucose control: BG goal 140-180, plan as above   SAT/SBT: n/a  Bowel regimen: PRN  Indwelling lines: PIV x 3, urinary  catheter, NG tube  De-escalate antibiotics: to cefepime, vancomyin      CODE STATUS: Full Code  DISPO: ICU    Lesley Michele MD  [employer] [facility] PGY-2

## 2024-11-23 NOTE — SUBJECTIVE & OBJECTIVE
Interval History: Doing well. Soft pressure this am. Given fluids with some response. Elevated lactate, K, and Trops. Making urine with Cr improving. Pain controlled. Ng and drain benign. Sugars  much better.     Medications:  Continuous Infusions:   0.9% NaCl  1,000 mL Intravenous Continuous   Stopped at 11/23/24 0413    D5 and 0.45% NaCl   Intravenous Continuous PRN   Stopped at 11/23/24 0933    potassium chloride 15 mEq in dextrose 5% lactated ringers 1,007.5 mL infusion   Intravenous Continuous   Stopped at 11/23/24 1102     Scheduled Meds:   acetaminophen  1,000 mg Intravenous Q8H    ceFEPime IV (PEDS and ADULTS)  2 g Intravenous Q12H    enoxparin  40 mg Subcutaneous Q24H (prophylaxis, 1700)    fluconazole (DIFLUCAN) IV (PEDS and ADULTS)  400 mg Intravenous Q24H    metroNIDAZOLE IV (PEDS and ADULTS)  500 mg Intravenous Q8H    mupirocin   Nasal BID    pantoprazole  40 mg Intravenous BID    [START ON 11/24/2024] vancomycin (VANCOCIN) IV (PEDS and ADULTS)  1,750 mg Intravenous Q24H     PRN Meds:  Current Facility-Administered Medications:     dextrose 10%, 12.5 g, Intravenous, PRN    dextrose 10%, 25 g, Intravenous, PRN    D5 and 0.45% NaCl, , Intravenous, Continuous PRN    HYDROmorphone, 0.5 mg, Intravenous, Q3H PRN    ondansetron, 4 mg, Intravenous, Q6H PRN    sodium chloride 0.9%, 10 mL, Intravenous, PRN    Pharmacy to dose Vancomycin consult, , , Once **AND** vancomycin - pharmacy to dose, , Intravenous, pharmacy to manage frequency     Review of patient's allergies indicates:   Allergen Reactions    Penicillins Rash     Objective:     Vital Signs (Most Recent):  Temp: 98.5 °F (36.9 °C) (11/23/24 0715)  Pulse: 101 (11/23/24 1106)  Resp: 14 (11/23/24 1106)  BP: (!) 102/59 (11/23/24 1100)  SpO2: 100 % (11/23/24 1106) Vital Signs (24h Range):  Temp:  [96.2 °F (35.7 °C)-98.5 °F (36.9 °C)] 98.5 °F (36.9 °C)  Pulse:  [] 101  Resp:  [8-37] 14  SpO2:  [72 %-100 %] 100 %  BP: ()/(43-80) 102/59     Weight:  103 kg (227 lb 1.2 oz)  Body mass index is 28.01 kg/m².    Intake/Output - Last 3 Shifts         11/21 0700  11/22 0659 11/22 0700  11/23 0659 11/23 0700 11/24 0659    I.V. (mL/kg)  435.5 (4.2) 666.9 (6.5)    IV Piggyback  3808.2 1553.8    Total Intake(mL/kg)  4243.7 (41.2) 2220.8 (21.6)    Urine (mL/kg/hr)  1200 90 (0.2)    Drains  395     Total Output  1595 90    Net  +2648.7 +2130.8                    Physical Exam  Vitals reviewed.   Constitutional:       General: He is not in acute distress.  HENT:      Head: Normocephalic and atraumatic.      Nose: Nose normal.   Eyes:      General:         Right eye: No discharge.         Left eye: No discharge.   Cardiovascular:      Rate and Rhythm: Normal rate and regular rhythm.   Pulmonary:      Effort: Pulmonary effort is normal. No respiratory distress.      Breath sounds: No stridor.   Abdominal:      Comments: Soft, ND  aTTP  Ng tube in place   Incisons with dressing in place  RUQ drain benign with SS output    Musculoskeletal:         General: Normal range of motion.      Cervical back: Normal range of motion.   Skin:     General: Skin is warm and dry.      Capillary Refill: Capillary refill takes 2 to 3 seconds.   Neurological:      General: No focal deficit present.      Mental Status: He is alert and oriented to person, place, and time.   Psychiatric:         Mood and Affect: Mood normal.         Behavior: Behavior normal.          Significant Labs:  I have reviewed all pertinent lab results within the past 24 hours.    Significant Diagnostics:  I have reviewed all pertinent imaging results/findings within the past 24 hours.

## 2024-11-23 NOTE — PLAN OF CARE
Vss, nadn, min. Pain now, moving self in bed, turning. ST with occass pvcs noted on mon. On RA, o2 sats wnls. ANALIA drain output 200cc today, serous in color with pink tinge, thin fluid. UOP wnls. Cloudy. Afebrile. NGT to liws. Pt remains NPO per Gen Surg MD. LR at 100cc/hr as ordered. Family/spouse at bedside, updated by MDs. See flow sheet for ethan info.

## 2024-11-23 NOTE — PLAN OF CARE
Pt AAOx4. Afebrile overnight. Sats remained >90 on RA. UOP adequate of 700 mL with weaver. NG tube output of 250 mL and 100 mL output from ANALIA drain. ML island border dressing. Pain controlled by PRN Dilaudid. D5 1/2NS at 125 mL/hr and Insulin gtts 0.5/kg.hr administered. IV potassium given for repletion. Frequent checks for safety done during shift. Report given to AM RN.            Problem: Adult Inpatient Plan of Care  Goal: Plan of Care Review  11/23/2024 0758 by Baljeet Henderson RN  Outcome: Progressing  11/23/2024 0758 by Baljeet Henderson RN  Outcome: Progressing  11/23/2024 0758 by Baljeet Henderson RN  Outcome: Progressing  11/23/2024 0757 by Baljeet Henderson RN  Reactivated  Goal: Patient-Specific Goal (Individualized)  11/23/2024 0758 by Baljeet Henderson RN  Outcome: Progressing  11/23/2024 0758 by Baljeet Henderson RN  Outcome: Progressing  11/23/2024 0758 by Baljeet Henderson RN  Outcome: Progressing  11/23/2024 0757 by Baljeet Henderson RN  Reactivated  Goal: Absence of Hospital-Acquired Illness or Injury  11/23/2024 0758 by Baljeet Henderson RN  Outcome: Progressing  11/23/2024 0758 by Baljeet Henderson RN  Outcome: Progressing  11/23/2024 0758 by Baljeet Henderson RN  Outcome: Progressing  11/23/2024 0757 by Baljeet Henderson RN  Reactivated  Goal: Optimal Comfort and Wellbeing  11/23/2024 0758 by Baljeet Henderson RN  Outcome: Progressing  11/23/2024 0758 by Baljeet Henderson RN  Outcome: Progressing  11/23/2024 0758 by Baljeet Henderson RN  Outcome: Progressing  11/23/2024 0757 by Baljeet Henderson RN  Reactivated  Goal: Readiness for Transition of Care  11/23/2024 0758 by Baljeet Henderson RN  Outcome: Progressing  11/23/2024 0758 by Baljeet Henderson RN  Outcome: Progressing  11/23/2024 0758 by Miles, Terinika, RN  Outcome: Progressing  11/23/2024 0757 by Baljeet Henderson, RN  Reactivated     Problem: Adult Inpatient Plan of Care  Goal: Optimal Comfort and Wellbeing  11/23/2024 0758 by Baljeet Hendersno,  RN  Outcome: Progressing  11/23/2024 0758 by Baljeet Henderson RN  Outcome: Progressing  11/23/2024 0758 by Baljeet Henderson RN  Outcome: Progressing  11/23/2024 0757 by Baljeet Henderson RN  Reactivated     Problem: Adult Inpatient Plan of Care  Goal: Readiness for Transition of Care  11/23/2024 0758 by Baljeet Henderson RN  Outcome: Progressing  11/23/2024 0758 by Baljeet Henderson RN  Outcome: Progressing  11/23/2024 0758 by Baljeet Henderson RN  Outcome: Progressing  11/23/2024 0757 by Baljeet Henderson RN  Reactivated     Problem: Wound  Goal: Optimal Coping  11/23/2024 0758 by Baljeet Henderson RN  Outcome: Progressing  11/23/2024 0758 by Baljeet Henderson RN  Outcome: Progressing  11/23/2024 0758 by Baljeet Henderson RN  Outcome: Progressing  11/23/2024 0757 by Baljeet Henderson RN  Reactivated  Goal: Optimal Functional Ability  11/23/2024 0758 by Baljeet Henderson RN  Outcome: Progressing  11/23/2024 0758 by Baljeet Hnederson RN  Outcome: Progressing  11/23/2024 0758 by Baljeet Henderson RN  Outcome: Progressing  11/23/2024 0757 by Baljeet Henderson RN  Reactivated  Goal: Absence of Infection Signs and Symptoms  11/23/2024 0758 by Baljeet Henderson RN  Outcome: Progressing  11/23/2024 0758 by Baljeet Henderson RN  Outcome: Progressing  11/23/2024 0758 by Baljeet Henderson RN  Outcome: Progressing  11/23/2024 0757 by Baljeet Henderson RN  Reactivated  Goal: Improved Oral Intake  11/23/2024 0758 by Baljeet Henderson RN  Outcome: Progressing  11/23/2024 0758 by Baljeet Henderson RN  Outcome: Progressing  11/23/2024 0758 by Baljeet Henderson RN  Outcome: Progressing  11/23/2024 0757 by Baljeet Henderson RN  Reactivated  Goal: Optimal Pain Control and Function  11/23/2024 0758 by Baljeet Henderson RN  Outcome: Progressing  11/23/2024 0758 by Baljeet Henderson, RN  Outcome: Progressing  11/23/2024 0758 by Baljeet Henderson, RN  Outcome: Progressing  11/23/2024 0757 by Baljeet Henderson, RN  Reactivated  Goal: Skin Health and  Integrity  11/23/2024 0758 by Baljeet Henderson RN  Outcome: Progressing  11/23/2024 0758 by Baljeet Henderson RN  Outcome: Progressing  11/23/2024 0758 by Baljeet Henderson RN  Outcome: Progressing  11/23/2024 0757 by Baljeet Henderson RN  Reactivated  Goal: Optimal Wound Healing  11/23/2024 0758 by Baljeet Henderson RN  Outcome: Progressing  11/23/2024 0758 by Baljeet Henderson RN  Outcome: Progressing  11/23/2024 0758 by Baljeet Henderson RN  Outcome: Progressing  11/23/2024 0757 by Baljeet Henderson RN  Reactivated     Problem: Wound  Goal: Absence of Infection Signs and Symptoms  11/23/2024 0758 by Baljeet Henderson RN  Outcome: Progressing  11/23/2024 0758 by Baljeet Henderson RN  Outcome: Progressing  11/23/2024 0758 by Baljeet Henderson RN  Outcome: Progressing  11/23/2024 0757 by Baljeet Henderson RN  Reactivated     Problem: Wound  Goal: Optimal Pain Control and Function  11/23/2024 0758 by Baljeet Henderson RN  Outcome: Progressing  11/23/2024 0758 by Baljeet Henderson RN  Outcome: Progressing  11/23/2024 0758 by Baljeet Henderson RN  Outcome: Progressing  11/23/2024 0757 by Baljeet Henderson RN  Reactivated     Problem: Diabetes Comorbidity  Goal: Blood Glucose Level Within Targeted Range  11/23/2024 0758 by Baljeet Henderson RN  Outcome: Progressing  11/23/2024 0758 by Baljeet Henderson RN  Outcome: Progressing  11/23/2024 0758 by Baljeet Henderson RN  Outcome: Progressing  11/23/2024 0757 by Baljeet Henderson RN  Reactivated     Problem: Fall Injury Risk  Goal: Absence of Fall and Fall-Related Injury  11/23/2024 0758 by Baljeet Henderson RN  Outcome: Progressing  11/23/2024 0758 by Baljeet Henderson RN  Outcome: Progressing  11/23/2024 0758 by Baljeet Henderson RN  Outcome: Progressing  11/23/2024 0757 by Baljeet Henderson RN  Reactivated

## 2024-11-23 NOTE — TRANSFER OF CARE
"Anesthesia Transfer of Care Note    Patient: Al Anna    Procedure(s) Performed: Procedure(s) (LRB):  LAPAROTOMY, EXPLORATORY (N/A)    Patient location: ICU    Anesthesia Type: general    Transport from OR: Transported from OR on 6-10 L/min O2 by face mask with adequate spontaneous ventilation. Continuous ECG monitoring in transport. Continuous SpO2 monitoring in transport    Post pain: adequate analgesia    Post assessment: no apparent anesthetic complications and tolerated procedure well    Post vital signs: stable    Level of consciousness: awake and alert    Nausea/Vomiting: no nausea/vomiting    Complications: none    Transfer of care protocol was followedComments: DO Daniel at bedside. Report given prior to arrival. Pt AA, following commands. VSS.       Last vitals: Visit Vitals  /75   Pulse (!) 112   Temp 35.7 °C (96.2 °F) (Axillary)   Resp 20   Ht 6' 3.5" (1.918 m)   Wt 103 kg (227 lb 1.2 oz)   SpO2 100%   BMI 28.01 kg/m²     "

## 2024-11-23 NOTE — ED PROVIDER NOTES
Encounter Date: 11/22/2024       History     Chief Complaint   Patient presents with    Abdominal Pain     Patient presents to the ED complaining of epigastric abdominal pain that started yesterday. Endorses nausea, emesis. Patient appears uncomfortable.     Al Anna is a 64 y.o. male who has a past medical history of Coronary artery disease, Diabetes mellitus, Glaucoma, High cholesterol, and Hypertension. presenting to the Emergency Department for abdominal pain.  Patient reports symptoms started around 4:00 a.m. this morning with associated nausea vomiting.  The abdominal pain is 10/10, located to the epigastric region, nonradiating.  Denies any changes in his bowel movements in the last two days but had some soft stools Wednesday. Denies urinary symptoms. Has had decreased appetite today. Daughters report he eats little on a daily basis. He reports he felt well yesterday and had good appetite. Patient takes Farxiga, Actos, metformin, Ozempic for diabetes.  Reports he takes the oral medications every other day due to adverse effects (decreased appetite and generalized malaise). No prescribed insulin. No hx of DKA. No alcohol or drug use. Denies CP, SOB, fevers.         The history is provided by the patient, the spouse and a relative.     Review of patient's allergies indicates:   Allergen Reactions    Penicillins Rash     Past Medical History:   Diagnosis Date    Coronary artery disease     MI (myocardial infarction)    Diabetes mellitus     Neuropathy - Retinopathy - PAD    Glaucoma     High cholesterol     Hypertension      Past Surgical History:   Procedure Laterality Date    DEBRIDEMENT OF ULCER WITH APPLICATION OF SKIN GRAFT Left 8/26/2022    Procedure: DEBRIDEMENT, ULCER, WITH SKIN GRAFT APPLICATION;  Surgeon: Dilip Horner MD;  Location: [facility];  Service: General;  Laterality: Left;    FOOT AMPUTATION THROUGH METATARSAL Left 04/28/2020    REVISION, AMPUTATION, LOWER EXTREMITY Left 7/17/2023     Procedure: REVISION, AMPUTATION, LEFT TMA STUMP;  Surgeon: Dilip Horner MD;  Location: [facility] OR;  Service: General;  Laterality: Left;    WOUND DEBRIDEMENT Left 05/23/2022    Procedure: DEBRIDEMENT, WOUND;  Surgeon: Dilip Horner MD;  Location: [facility] OR;  Service: General;  Laterality: Left;    WOUND DEBRIDEMENT Left 7/17/2023    Procedure: DEBRIDEMENT, WOUND left tma stump;  Surgeon: Dilip Horner MD;  Location: [facility] OR;  Service: General;  Laterality: Left;     Family History   Problem Relation Name Age of Onset    Heart disease Father       Social History     Tobacco Use    Smoking status: Every Day     Current packs/day: 0.50     Average packs/day: 0.5 packs/day for 45.1 years (22.5 ttl pk-yrs)     Types: Cigarettes     Start date: 10/28/1979    Smokeless tobacco: Never   Substance Use Topics    Alcohol use: Yes     Alcohol/week: 4.0 standard drinks of alcohol     Types: 4 Cans of beer per week    Drug use: No     Review of Systems   Constitutional:  Positive for appetite change. Negative for chills and fever.   Respiratory:  Negative for cough and shortness of breath.    Cardiovascular:  Negative for chest pain.   Gastrointestinal:  Positive for abdominal pain, nausea and vomiting. Negative for constipation and diarrhea.   Skin:  Negative for color change.   Neurological:  Positive for weakness (generalized). Negative for light-headedness.   Psychiatric/Behavioral:  Negative for agitation and confusion.        Physical Exam     Initial Vitals [11/22/24 1518]   BP Pulse Resp Temp SpO2   107/64 (!) 140 20 97.5 °F (36.4 °C) 99 %      MAP       --         Physical Exam    Nursing note and vitals reviewed.  Constitutional: He appears well-developed and well-nourished. He is not diaphoretic. He is sleeping and cooperative. He is easily aroused. He appears ill. No distress.   HENT:   Head: Normocephalic and atraumatic.   Right Ear: External ear normal.   Left Ear: External ear normal.   Eyes: EOM  are normal.   Neck: Neck supple.   Normal range of motion.  Cardiovascular:  Regular rhythm.   Tachycardia present.         Pulmonary/Chest: Breath sounds normal. No respiratory distress. He has no wheezes.   Abdominal: He exhibits no distension. There is abdominal tenderness (marked diffuse abdominal tenderness. No focal tenderness).   Musculoskeletal:         General: Normal range of motion.      Cervical back: Normal range of motion and neck supple.     Neurological: He is oriented to person, place, and time and easily aroused. GCS score is 15. GCS eye subscore is 4. GCS verbal subscore is 5. GCS motor subscore is 6.   Skin: Skin is dry.   Psychiatric: He has a normal mood and affect. His behavior is normal. Judgment and thought content normal.         ED Course   Critical Care    Date/Time: 11/22/2024 10:16 PM    Performed by: Aby Prescott MD  Authorized by: Aby Prescott MD  Direct patient critical care time: 15 minutes  Additional history critical care time: 15 minutes  Ordering / reviewing critical care time: 15 minutes  Documentation critical care time: 20 minutes  Consulting other physicians critical care time: 5 minutes  Consult with family critical care time: 10 minutes  Total critical care time (exclusive of procedural time) : 80 minutes  Critical care time was exclusive of separately billable procedures and treating other patients.  Critical care was necessary to treat or prevent imminent or life-threatening deterioration of the following conditions: sepsis, circulatory failure, endocrine crisis and renal failure.  Critical care was time spent personally by me on the following activities: development of treatment plan with patient or surrogate, discussions with consultants, evaluation of patient's response to treatment, examination of patient, obtaining history from patient or surrogate, ordering and performing treatments and interventions, ordering and review of laboratory studies, ordering and  review of radiographic studies, pulse oximetry, re-evaluation of patient's condition and review of old charts.        Labs Reviewed   CBC W/ AUTO DIFFERENTIAL - Abnormal       Result Value    WBC 11.26      RBC 5.82      Hemoglobin 16.1      Hematocrit 50.4      MCV 87      MCH 27.7      MCHC 31.9 (*)     RDW 15.3 (*)     Platelets 241      MPV 11.3      Immature Granulocytes 1.0 (*)     Gran # (ANC) 9.5 (*)     Immature Grans (Abs) 0.11 (*)     Lymph # 1.1      Mono # 0.5      Eos # 0.0      Baso # 0.05      nRBC 0      Gran % 84.3 (*)     Lymph % 9.9 (*)     Mono % 4.2      Eosinophil % 0.2      Basophil % 0.4      Differential Method Automated     TROPONIN I - Abnormal    Troponin I 0.046 (*)    BETA - HYDROXYBUTYRATE, SERUM - Abnormal    Beta-Hydroxybutyrate 2.3 (*)    COMPREHENSIVE METABOLIC PANEL - Abnormal    Sodium 138      Potassium 4.9      Chloride 101      CO2 13 (*)     Glucose 520 (*)     BUN 29 (*)     Creatinine 2.5 (*)     Calcium 9.7      Total Protein 7.6      Albumin 3.1 (*)     Total Bilirubin 0.5      Alkaline Phosphatase 76      AST 18      ALT 11      eGFR 28 (*)     Anion Gap 24 (*)     Narrative:     GLU critical result(s) called and verbal readback obtained from   Shea Paredes RN. by RE3 11/22/2024 19:50   PHOSPHORUS - Abnormal    Phosphorus 6.2 (*)    POCT GLUCOSE - Abnormal    POCT Glucose 496 (*)    POCT GLUCOSE - Abnormal    POCT Glucose 473 (*)    CULTURE, BLOOD   CULTURE, BLOOD   MAGNESIUM   PHOSPHORUS   MAGNESIUM    Magnesium 2.2     URINALYSIS, REFLEX TO URINE CULTURE   TROPONIN I   COMPREHENSIVE METABOLIC PANEL   LACTIC ACID, PLASMA   POCT GLUCOSE MONITORING CONTINUOUS   POCT GLUCOSE MONITORING CONTINUOUS   POCT GLUCOSE MONITORING CONTINUOUS     EKG Readings: (Independently Interpreted)   Initial: 1517. Rhythm: Sinus Tachycardia. Heart Rate: 138. ST Segments: Non-Specific ST Segment Depression.     ECG Results              EKG 12-lead (In process)        Collection Time  Result Time QRS Duration OHS QTC Calculation    11/22/24 15:17:15 11/22/24 17:29:26 88 439                     In process by Interface, Lab In [facility] (11/22/24 17:29:33)                   Narrative:    Test Reason : R00.0,    Vent. Rate : 138 BPM     Atrial Rate : 138 BPM     P-R Int : 140 ms          QRS Dur :  88 ms      QT Int : 290 ms       P-R-T Axes :  68  98 -14 degrees    QTcB Int : 439 ms    Sinus tachycardia  Rightward axis  ST and T wave abnormality, consider inferior ischemia  Abnormal ECG  No previous ECGs available    Referred By: AAAREFERRAL SELF           Confirmed By:                                   Imaging Results              CT Abdomen Pelvis  Without Contrast (Final result)  Result time 11/22/24 21:58:28      Final result by Ceh Singleton MD (11/22/24 21:58:28)                   Impression:      Findings compatible with perforated viscus with mild to moderate intraperitoneal free air and small amount of fluid more so in the upper abdomen and on the left.  There is a round air-filled structure possibly representing a duodenal diverticulum.  The possibility of peptic ulcer involving the adjacent gastric antrum is considered but it looks somewhat atypical for this diagnosis.    No other acute findings involving the bowel to suggest etiology or location of the ruptured viscus.  Appendix is normal.    No significant acute focal visceral lesions as imaged without IV contrast.    Prostatomegaly.    Severe L4-L5 spondylosis.    Please see above discussion for additional incidental findings.    Dr. Aby Prescott was notified at time of discovery via secure chat with acknowledgement of receipt.      Electronically signed by: Che Singleton  Date:    11/22/2024  Time:    21:58               Narrative:    EXAMINATION:  CT ABDOMEN PELVIS WITHOUT CONTRAST    CLINICAL HISTORY:  Abdominal abscess/infection suspected;    TECHNIQUE:  Low dose axial images, sagittal and coronal reformations were obtained  from the lung bases to the pubic symphysis, without contrast..    COMPARISON:  Lumbar spine CT 08/12/2008    FINDINGS:  The lack of intravenous contrast limits evaluation of the solid organs for focal lesions.    Heart: Normal in size. No pericardial effusion.    Lung Bases: There is mild dependent atelectasis.  No pleural effusion.    Bowel there is pneumoperitoneum most pronounced in the upper abdomen around the liver but also seen amongst the mesentery, in the gallbladder fossa.  Stomach proximally is unremarkable.  There is a focal air-filled bowel structure  along the gastric antrum and duodenum -axial images 41 through 57 series 2- which may represent a duodenal diverticulum.  Thinning of the gastric wall with a pre-pyloric peptic ulcer however is a possibility though it appears somewhat atypical for this.    There is a small amount of fluid tracking along the pericolic gutters more so on the left with a small amount in the pelvis and around the spleen.    Small bowel is unremarkable.  The appendix is normal.  Colon is unremarkable with no evidence of pneumatosis.  There is no bowel obstruction.  No convincing bowel mucosal thickening or transmural inflammation in the fat allowing for the free fluid.    Liver: Normal in size and attenuation, with no visualized focal hepatic lesions.    Gallbladder: Gallbladder is not significantly distended.  No evidence of cholecystolithiasis.    Bile Ducts: No evidence of dilated ducts.    Pancreas: No mass or peripancreatic fat stranding.    Spleen: Small granuloma.  No splenomegaly.    Adrenals: Unremarkable.    Kidneys/ Ureters: Renal cortices appear homogeneous.  No evidence of hydronephrosis or nephrolithiasis.  Mild nonspecific perinephric stranding in the fat.    Bladder: No evidence of wall thickening.  Coarse calcification along the dome of the bladder dorsally appears to be outside the bladder along the wall and likely vascular.    Reproductive organs:  Prostatomegaly    Retroperitoneum: No significant adenopathy.    Abdominal wall: Unremarkable.    Vasculature: There is moderate atherosclerosis of the aorta and more prominent atherosclerosis in the iliac arteries.  There is no evidence of aneurysm.    Bones: There is spondylosis of the spine.  Severe L4-L5 degenerative changes with vacuum disc phenomenon as well as significant endplate irregularity.  No evidence of fracture or subluxation.  Hemangioma at L2.                                       Medications   sodium chloride 0.9% bolus 500 mL 500 mL (500 mLs Intravenous New Bag 11/22/24 2141)   sodium chloride 0.9% flush 10 mL (has no administration in time range)   0.9% NaCl infusion (has no administration in time range)   dextrose 5 % and 0.45 % NaCl infusion (has no administration in time range)   dextrose 10% bolus 250 mL 250 mL (has no administration in time range)   dextrose 10% bolus 125 mL 125 mL (has no administration in time range)   insulin regular bolus from bag/infusion 10.61 Units 10.61 mL (has no administration in time range)   insulin regular 1 Units/mL in 0.9% NaCl 100 mL infusion (has no administration in time range)   pantoprazole injection 80 mg (has no administration in time range)   cefTRIAXone injection 2 g (has no administration in time range)   metronidazole IVPB 500 mg (has no administration in time range)   sodium chloride 0.9% bolus 1,000 mL 1,000 mL (0 mLs Intravenous Stopped 11/22/24 2001)   aspirin tablet 325 mg (325 mg Oral Given 11/22/24 2140)   HYDROmorphone injection 0.5 mg (0.5 mg Intravenous Given 11/22/24 2034)     Medical Decision Making  64-year-old male presents with epigastric abdominal pain that started early this morning with associated nausea vomiting.  Patient is sleeping on my initial evaluation but easily aroused.  He was significantly tachycardic. BP soft in triage, now improved. Labs pending. Agree with workup per triage provider. Will add additional  IVF    Differential Diagnosis includes, but is not limited to:  Bowel obstruction, incarcerated/strangulated hernia, ileus, appendicitis, cholecystitis, aspirated foreign body, esophageal food impaction, biliary colic, colitis/diverticulitis, gastroenteritis, esophagitis, hepatitis, pancreatitis, GERD, PUD, constipation, nephrolithiasis, UTI/pyelonephritis.        Problems Addressed:  Diabetic ketoacidosis without coma associated with type 2 diabetes mellitus: acute illness or injury  Elevated troponin: acute illness or injury  Hyperglycemia: acute illness or injury  Tachycardia: acute illness or injury  Upper abdominal pain: acute illness or injury    Amount and/or Complexity of Data Reviewed  External Data Reviewed: notes.     Details: Internal Med visit 09/17/2024-management of chronic conditions  08/20/2024 General surgery stump wound    Labs: ordered. Decision-making details documented in ED Course.  Radiology: ordered. Decision-making details documented in ED Course.  Discussion of management or test interpretation with external provider(s): Internal Medicine per ED course    Risk  OTC drugs.  Prescription drug management.  Decision regarding hospitalization.              Attending Attestation:     Physician Attestation Statement for NP/PA:   I personally made/approved the management plan and take responsibility for the patient management.    Other NP/PA Attestation Additions:      Medical Decision Making: The patient is a 64-year-old male who came to the emergency department with abdominal pain and nausea vomiting.  He is a noncompliant diabetic who is in DKA tonight with a glucose of almost 500.  He has a history of CKD and is tachycardic.  CT abdomen pelvis shows perforated viscus, likely gastric in origin however a duodenal diverticulum is noted also.  The case was discussed with General surgery and he will evaluate the patient.  The patient will need ICU admission.  He is currently on an insulin drip and  receiving IV fluids as well.    2218: He meets SIRS and sepsis criteria, order set applied and he will be started on antibiotics.             ED Course as of 11/22/24 2219 Fri Nov 22, 2024 1826 Beta-Hydroxybutyrate(!): 2.3  Fluids ordered. Chemistries pending [CS]   1834 WBC: 11.26  No leukocytosis [CS]   1834 Hemoglobin: 16.1  No anemia [CS]   1834 Hematocrit: 50.4 [CS]   1834 Gran # (ANC)(!): 9.5  Left shift noted [CS]   1834 Platelet Count: 241  Normal platelets [CS]   1852 Troponin I(!): 0.046  Mildly elevated trop [CS]   1855 POCT Glucose(!!): 496  hyperglycemia [CS]   1936 POC PH(!!): 7.260  acidotic [CS]   1950 Glucose(!!): 520 [CS]   1951 Anion Gap(!): 24  Significant AG, consistent with DKA. Potassium 4.9. Will place orders for insulin dextrose to close gap. [CS]   1951 Creatinine(!): 2.5  LOW on CKD (baseline 1.5-1.9) [CS]   1951 eGFR(!): 28 [CS]   1951 Phosphorus Level(!): 6.2  hyperphosphatemia [CS]   1951 Potassium: 4.9  WNL [CS]   1951 Sodium: 138  WNL [CS]   1951 Magnesium : 2.2  WNL [CS]   2058 It is the end of my shift. My attending Dr. Prescott to follow CT abd. Plan for admission for DKA, elevated troponin.  [CS]   2208 CT Abdomen Pelvis  Without Contrast  Impression:     Findings compatible with perforated viscus with mild to moderate intraperitoneal free air and small amount of fluid more so in the upper abdomen and on the left.  There is a round air-filled structure possibly representing a duodenal diverticulum.  The possibility of peptic ulcer involving the adjacent gastric antrum is considered but it looks somewhat atypical for this diagnosis.     No other acute findings involving the bowel to suggest etiology or location of the ruptured viscus.  Appendix is normal.     No significant acute focal visceral lesions as imaged without IV contrast.     Prostatomegaly.     Severe L4-L5 spondylosis.     Please see above discussion for additional incidental findings.   [ST]      ED Course User  Index  [CS] Ayanna Hardy PA-C  [ST] Aby Prescott MD                           Clinical Impression:  Final diagnoses:  [R00.0] Tachycardia  [R73.9] Hyperglycemia  [R79.89] Elevated troponin  [E11.10] Diabetic ketoacidosis without coma associated with type 2 diabetes mellitus (Primary)  [R10.10] Upper abdominal pain  [R19.8] Perforated abdominal viscus          ED Disposition Condition    Admit Critical                Ayanna Hardy PA-C  11/22/24 2059       Aby Prescott MD  11/22/24 [id number]

## 2024-11-23 NOTE — PROGRESS NOTES
"Pharmacokinetic Initial Assessment: IV Vancomycin    Assessment/Plan:    Initiate intravenous vancomycin with loading dose of 1g (OR) +1g (2g total) mg once followed by a maintenance dose of vancomycin 1750mg IV every 24 hours  Desired empiric serum trough concentration is 10 to 15 mcg/mL  Draw vancomycin trough level 60 min prior to third dose on 11/25 at approximately 0500  Pharmacy will continue to follow and monitor vancomycin.      Please contact pharmacy at extension [phone] with any questions regarding this assessment.     Thank you for the consult,   Tuan Miller       Patient brief summary:  Al Anna is a 64 y.o. male initiated on antimicrobial therapy with IV Vancomycin for treatment of suspected intra-abdominal infection    Drug Allergies:   Review of patient's allergies indicates:   Allergen Reactions    Penicillins Rash       Actual Body Weight:   103kg    Renal Function:   Estimated Creatinine Clearance: 40.7 mL/min (A) (based on SCr of 2.4 mg/dL (H)).,     Dialysis Method (if applicable):  N/A    CBC (last 72 hours):  Recent Labs   Lab Result Units 11/22/24  1811   WBC K/uL 11.26   Hemoglobin g/dL 16.1   Hematocrit % 50.4   Platelets K/uL 241   Gran % % 84.3*   Lymph % % 9.9*   Mono % % 4.2   Eosinophil % % 0.2   Basophil % % 0.4   Differential Method  Automated       Metabolic Panel (last 72 hours):  Recent Labs   Lab Result Units 11/22/24  1902 11/22/24  2245   Sodium mmol/L 138 139   Potassium mmol/L 4.9 4.5   Chloride mmol/L 101 104   CO2 mmol/L 13* 16*   Glucose mg/dL 520* 488*   BUN mg/dL 29* 30*   Creatinine mg/dL 2.5* 2.4*   Albumin g/dL 3.1* 2.7*   Total Bilirubin mg/dL 0.5 0.5   Alkaline Phosphatase U/L 76 67   AST U/L 18 14   ALT U/L 11 9*   Magnesium mg/dL 2.2  --    Phosphorus mg/dL 6.2*  --        Drug levels (last 3 results):  No results for input(s): "VANCOMYCINRA", "VANCORANDOM", "VANCOMYCINPE", "VANCOPEAK", "VANCOMYCINTR", "VANCOTROUGH" in the last 72 hours.    Microbiologic " Results:  Microbiology Results (last 7 days)       Procedure Component Value Units Date/Time    Blood culture x two cultures. Draw prior to antibiotics. [[id number]] Collected: 11/22/24 2244    Order Status: Sent Specimen: Blood from Peripheral, Hand, Right Updated: 11/23/24 0236    Blood culture x two cultures. Draw prior to antibiotics. [[id number]] Collected: 11/22/24 2230    Order Status: Sent Specimen: Blood from Peripheral, Hand, Left Updated: 11/23/24 0224    Aerobic culture [[id number]] Collected: 11/23/24 0122    Order Status: Sent Specimen: Skin from Abdomen Updated: 11/23/24 0123    Fungus culture [[id number]] Collected: 11/23/24 0122    Order Status: Sent Specimen: Tissue from Abdomen Updated: 11/23/24 0123    Culture, Anaerobe [[id number]] Collected: 11/23/24 0122    Order Status: Sent Specimen: Tissue from Abdomen Updated: 11/23/24 0123    AFB Culture & Smear [[id number]] Collected: 11/23/24 0122    Order Status: Sent Specimen: Tissue from Abdomen Updated: 11/23/24 0123    Gram stain [[id number]] Collected: 11/23/24 0122    Order Status: Sent Specimen: Tissue from Abdomen Updated: 11/23/24 0122

## 2024-11-24 LAB
ALBUMIN SERPL BCP-MCNC: 1.9 G/DL (ref 3.5–5.2)
ALP SERPL-CCNC: 58 U/L (ref 40–150)
ALT SERPL W/O P-5'-P-CCNC: 11 U/L (ref 10–44)
ANION GAP SERPL CALC-SCNC: 9 MMOL/L (ref 8–16)
AST SERPL-CCNC: 19 U/L (ref 10–40)
BASOPHILS # BLD AUTO: 0.05 K/UL (ref 0–0.2)
BASOPHILS NFR BLD: 0.5 % (ref 0–1.9)
BILIRUB SERPL-MCNC: 0.3 MG/DL (ref 0.1–1)
BUN SERPL-MCNC: 31 MG/DL (ref 8–23)
CALCIUM SERPL-MCNC: 8.6 MG/DL (ref 8.7–10.5)
CHLORIDE SERPL-SCNC: 114 MMOL/L (ref 95–110)
CO2 SERPL-SCNC: 18 MMOL/L (ref 23–29)
CREAT SERPL-MCNC: 1.5 MG/DL (ref 0.5–1.4)
DIFFERENTIAL METHOD BLD: ABNORMAL
EOSINOPHIL # BLD AUTO: 0.2 K/UL (ref 0–0.5)
EOSINOPHIL NFR BLD: 1.7 % (ref 0–8)
ERYTHROCYTE [DISTWIDTH] IN BLOOD BY AUTOMATED COUNT: 15.7 % (ref 11.5–14.5)
EST. GFR  (NO RACE VARIABLE): 52 ML/MIN/1.73 M^2
GLUCOSE SERPL-MCNC: 138 MG/DL (ref 70–110)
HCT VFR BLD AUTO: 39.9 % (ref 40–54)
HGB BLD-MCNC: 12.9 G/DL (ref 14–18)
IMM GRANULOCYTES # BLD AUTO: 0.16 K/UL (ref 0–0.04)
IMM GRANULOCYTES NFR BLD AUTO: 1.5 % (ref 0–0.5)
LYMPHOCYTES # BLD AUTO: 1.3 K/UL (ref 1–4.8)
LYMPHOCYTES NFR BLD: 12.5 % (ref 18–48)
MAGNESIUM SERPL-MCNC: 1.9 MG/DL (ref 1.6–2.6)
MCH RBC QN AUTO: 27.7 PG (ref 27–31)
MCHC RBC AUTO-ENTMCNC: 32.3 G/DL (ref 32–36)
MCV RBC AUTO: 86 FL (ref 82–98)
MONOCYTES # BLD AUTO: 0.7 K/UL (ref 0.3–1)
MONOCYTES NFR BLD: 7.1 % (ref 4–15)
NEUTROPHILS # BLD AUTO: 8 K/UL (ref 1.8–7.7)
NEUTROPHILS NFR BLD: 76.7 % (ref 38–73)
NRBC BLD-RTO: 0 /100 WBC
PHOSPHATE SERPL-MCNC: 3.1 MG/DL (ref 2.7–4.5)
PLATELET # BLD AUTO: 168 K/UL (ref 150–450)
PLATELET BLD QL SMEAR: ABNORMAL
PMV BLD AUTO: 11.6 FL (ref 9.2–12.9)
POCT GLUCOSE: 113 MG/DL (ref 70–110)
POCT GLUCOSE: 149 MG/DL (ref 70–110)
POCT GLUCOSE: 194 MG/DL (ref 70–110)
POCT GLUCOSE: 97 MG/DL (ref 70–110)
POTASSIUM SERPL-SCNC: 3.9 MMOL/L (ref 3.5–5.1)
PROT SERPL-MCNC: 5.6 G/DL (ref 6–8.4)
RBC # BLD AUTO: 4.65 M/UL (ref 4.6–6.2)
SODIUM SERPL-SCNC: 141 MMOL/L (ref 136–145)
WBC # BLD AUTO: 10.45 K/UL (ref 3.9–12.7)

## 2024-11-24 PROCEDURE — 97530 THERAPEUTIC ACTIVITIES: CPT

## 2024-11-24 PROCEDURE — 97161 PT EVAL LOW COMPLEX 20 MIN: CPT | Performed by: PHYSICAL THERAPIST

## 2024-11-24 PROCEDURE — 85025 COMPLETE CBC W/AUTO DIFF WBC: CPT

## 2024-11-24 PROCEDURE — 63600175 PHARM REV CODE 636 W HCPCS: Performed by: STUDENT IN AN ORGANIZED HEALTH CARE EDUCATION/TRAINING PROGRAM

## 2024-11-24 PROCEDURE — 94799 UNLISTED PULMONARY SVC/PX: CPT

## 2024-11-24 PROCEDURE — 80053 COMPREHEN METABOLIC PANEL: CPT

## 2024-11-24 PROCEDURE — 97530 THERAPEUTIC ACTIVITIES: CPT | Performed by: PHYSICAL THERAPIST

## 2024-11-24 PROCEDURE — 63600175 PHARM REV CODE 636 W HCPCS: Performed by: INTERNAL MEDICINE

## 2024-11-24 PROCEDURE — 84100 ASSAY OF PHOSPHORUS: CPT

## 2024-11-24 PROCEDURE — 83735 ASSAY OF MAGNESIUM: CPT

## 2024-11-24 PROCEDURE — 63600175 PHARM REV CODE 636 W HCPCS

## 2024-11-24 PROCEDURE — 11000001 HC ACUTE MED/SURG PRIVATE ROOM

## 2024-11-24 PROCEDURE — 63600175 PHARM REV CODE 636 W HCPCS: Mod: JZ,JG

## 2024-11-24 PROCEDURE — 25000003 PHARM REV CODE 250: Performed by: INTERNAL MEDICINE

## 2024-11-24 PROCEDURE — 36415 COLL VENOUS BLD VENIPUNCTURE: CPT

## 2024-11-24 PROCEDURE — 94761 N-INVAS EAR/PLS OXIMETRY MLT: CPT

## 2024-11-24 PROCEDURE — 97165 OT EVAL LOW COMPLEX 30 MIN: CPT

## 2024-11-24 RX ORDER — GLUCAGON 1 MG
1 KIT INJECTION
Status: DISCONTINUED | OUTPATIENT
Start: 2024-11-24 | End: 2024-11-28 | Stop reason: HOSPADM

## 2024-11-24 RX ORDER — INSULIN ASPART 100 [IU]/ML
0-5 INJECTION, SOLUTION INTRAVENOUS; SUBCUTANEOUS EVERY 6 HOURS PRN
Status: DISCONTINUED | OUTPATIENT
Start: 2024-11-24 | End: 2024-11-28 | Stop reason: HOSPADM

## 2024-11-24 RX ORDER — VANCOMYCIN 1.75 GRAM/500 ML IN 0.9 % SODIUM CHLORIDE INTRAVENOUS
1750
Status: DISCONTINUED | OUTPATIENT
Start: 2024-11-25 | End: 2024-11-24

## 2024-11-24 RX ORDER — LABETALOL HYDROCHLORIDE 5 MG/ML
10 INJECTION, SOLUTION INTRAVENOUS EVERY 6 HOURS PRN
Status: DISCONTINUED | OUTPATIENT
Start: 2024-11-24 | End: 2024-11-28 | Stop reason: HOSPADM

## 2024-11-24 RX ORDER — ACETAMINOPHEN 10 MG/ML
1000 INJECTION, SOLUTION INTRAVENOUS EVERY 8 HOURS
Status: COMPLETED | OUTPATIENT
Start: 2024-11-24 | End: 2024-11-25

## 2024-11-24 RX ADMIN — MUPIROCIN: 20 OINTMENT TOPICAL at 08:11

## 2024-11-24 RX ADMIN — MUPIROCIN: 20 OINTMENT TOPICAL at 09:11

## 2024-11-24 RX ADMIN — HYDROMORPHONE HYDROCHLORIDE 0.5 MG: 2 INJECTION, SOLUTION INTRAMUSCULAR; INTRAVENOUS; SUBCUTANEOUS at 12:11

## 2024-11-24 RX ADMIN — HYDROMORPHONE HYDROCHLORIDE 0.5 MG: 2 INJECTION, SOLUTION INTRAMUSCULAR; INTRAVENOUS; SUBCUTANEOUS at 05:11

## 2024-11-24 RX ADMIN — ACETAMINOPHEN 1000 MG: 10 INJECTION INTRAVENOUS at 09:11

## 2024-11-24 RX ADMIN — CEFEPIME 2 G: 2 INJECTION, POWDER, FOR SOLUTION INTRAVENOUS at 04:11

## 2024-11-24 RX ADMIN — PANTOPRAZOLE SODIUM 40 MG: 40 INJECTION, POWDER, FOR SOLUTION INTRAVENOUS at 08:11

## 2024-11-24 RX ADMIN — ENOXAPARIN SODIUM 40 MG: 40 INJECTION SUBCUTANEOUS at 05:11

## 2024-11-24 RX ADMIN — METRONIDAZOLE 500 MG: 500 INJECTION, SOLUTION INTRAVENOUS at 08:11

## 2024-11-24 RX ADMIN — HYDROMORPHONE HYDROCHLORIDE 0.5 MG: 2 INJECTION, SOLUTION INTRAMUSCULAR; INTRAVENOUS; SUBCUTANEOUS at 08:11

## 2024-11-24 RX ADMIN — VANCOMYCIN HYDROCHLORIDE 1750 MG: 500 INJECTION, POWDER, LYOPHILIZED, FOR SOLUTION INTRAVENOUS at 05:11

## 2024-11-24 RX ADMIN — FLUCONAZOLE 400 MG: 2 INJECTION, SOLUTION INTRAVENOUS at 04:11

## 2024-11-24 RX ADMIN — INSULIN GLARGINE 7 UNITS: 100 INJECTION, SOLUTION SUBCUTANEOUS at 09:11

## 2024-11-24 RX ADMIN — METRONIDAZOLE 500 MG: 500 INJECTION, SOLUTION INTRAVENOUS at 03:11

## 2024-11-24 RX ADMIN — METRONIDAZOLE 500 MG: 500 INJECTION, SOLUTION INTRAVENOUS at 12:11

## 2024-11-24 RX ADMIN — ACETAMINOPHEN 1000 MG: 10 INJECTION INTRAVENOUS at 02:11

## 2024-11-24 RX ADMIN — PANTOPRAZOLE SODIUM 40 MG: 40 INJECTION, POWDER, FOR SOLUTION INTRAVENOUS at 09:11

## 2024-11-24 RX ADMIN — ACETAMINOPHEN 1000 MG: 10 INJECTION INTRAVENOUS at 06:11

## 2024-11-24 RX ADMIN — ONDANSETRON 4 MG: 2 INJECTION INTRAMUSCULAR; INTRAVENOUS at 08:11

## 2024-11-24 RX ADMIN — CEFEPIME 2 G: 2 INJECTION, POWDER, FOR SOLUTION INTRAVENOUS at 05:11

## 2024-11-24 RX ADMIN — SODIUM CHLORIDE, POTASSIUM CHLORIDE, SODIUM LACTATE AND CALCIUM CHLORIDE: 600; 310; 30; 20 INJECTION, SOLUTION INTRAVENOUS at 08:11

## 2024-11-24 NOTE — ASSESSMENT & PLAN NOTE
64M here with abdominal pain found to be in DKA and has CT scan with free air. Exam consistent with peritonitis. He is now s/p ex-lap with omental patch of gastric perforation on 11/23.     Will see what CBC is this am.    Ng tube, NPO, mIVF for at least 3-4 days. Contrast study prior to NG removal.   Broad spec IV abx with Fluc. BID IV Protonix. H. Pylori ab  Blood cultures with possible contaminant? Repeat cx.   Trops down trend and echo unremarkable. Tachycardia improving.   Ottoniel improving  and K normal. Trend and repeat labs.   Continue strict glucose control for post-op ulcer healing and wound healing   Rest of care per ICU   DVT ppx   Please call with questions or worsening of condition .

## 2024-11-24 NOTE — PT/OT/SLP EVAL
Occupational Therapy   Evaluation & Tx    Name: Al Anna  MRN: [id number]  Admitting Diagnosis: Free intraperitoneal air  Recent Surgery: Procedure(s) (LRB):  LAPAROTOMY, EXPLORATORY (N/A) 1 Day Post-Op    Recommendations:     Discharge Recommendations: Low Intensity Therapy (if able to manage stairs at acute setting)  Discharge Equipment Recommendations:   (TBD further ambulatory assessment - may need RW)  Barriers to discharge:  Inaccessible home environment    Assessment:     Al Anna is a 64 y.o. male with a medical diagnosis of Free intraperitoneal air.  He presents with the following performance deficits affecting function: weakness, impaired endurance, impaired sensation, impaired self care skills, gait instability, impaired functional mobility, decreased coordination, decreased lower extremity function, decreased safety awareness, impaired skin, edema.      Pt was agreeable to and participated in OT/PT evaluation.  Pt reports that he was mod I with func mobility and ADLs at [facility].  Pt completed his evaluation and noted to require setup - mod A with ADLS and CGA-min A with bed mobility.  Standing not assessed due to  wound on pt's L foot - awaiting family to bring shoe with insert to further eval.  Goals established to assist pt with returning to [facility] regarding ADLs and func mobility.  Pt will benefit from skilled OT services in order to increase his level of safety and independence with ADLs and mobility.      Rehab Prognosis: Good; patient would benefit from acute skilled OT services to address these deficits and reach maximum level of function.       Plan:     Patient to be seen 3 x/week to address the above listed problems via self-care/home management, therapeutic activities, therapeutic exercises  Plan of Care Expires: 12/24/24  Plan of Care Reviewed with: patient    Subjective     Chief Complaint: tired, but no significant complaints  Patient/Family Comments/goals: return home    Occupational  Profile:  Living Environment: pt lives with his wife and adult daughter in a SSH with steps to enter - pt uses back/side entrances that have 7 ZANA and BHR (front door has 6 ZANA but HRs are too far apart); t/s combo for bathing  Previous level of function: mod I with mobility and I with ADLS  Equipment Used at Home: cane, straight, grab bar (hurrycane)  Assistance upon Discharge: from wife and daughter    Pain/Comfort:  Pain Rating 1: 0/10  Pain Rating Post-Intervention 1: 0/10    Patients cultural, spiritual, Jew conflicts given the current situation: no    Objective:     Communicated with: nurse prior to session.  Patient found HOB elevated with blood pressure cuff, weaver catheter, ANALIA drain, NG tube, PureWick, peripheral IV, pulse ox (continuous), telemetry upon OT entry to room.    General Precautions: Standard, fall  Orthopedic Precautions: N/A  Braces: N/A (states he uses insert on shoe for walking - awaiting family to deliver)  Respiratory Status: Room air    Occupational Performance:    Bed Mobility:    Patient completed Scooting/Bridging with contact guard assistance  Patient completed Supine to Sit with  minimum assistance  Patient completed Sit to Supine with contact guard assistance    Functional Mobility/Transfers:  N/A - pt has wound on L foot (seeing wound care) that is bandaged along toe amputation site (a few years post amputation) - states he uses shoe inserts to walk/stand      Activities of Daily Living:  Grooming: set up A    Lower Body Dressing: moderate assistance able to jeny/doff R sock only when sitting EOB    Cognitive/Visual Perceptual:  Cognitive/Psychosocial Skills:     -       Oriented to: Person, Place, Time, and Situation   -       Follows Commands/attention:Follows two-step commands  -       Communication: clear/fluent  -       Memory: No Deficits noted  -       Safety awareness/insight to disability: impaired   -       Mood/Affect/Coping skills/emotional control: Appropriate  to situation    Physical Exam:  Balance: -       sitting = good;  standing = not tested  Postural examination/scapula alignment:    -       Rounded shoulders  Skin integrity: bandage on L foot  Edema:  Mild L lower leg  Sensation: -       Intact  Upper Extremity Range of Motion:   BUEs = WFL  Upper Extremity Strength:  BUEs = WFL   Strength:  BUEs = WFL    AMPAC 6 Click ADL:  AMPAC Total Score: 18    Treatment & Education:  Pt completed ADLs and func mobility activities for tx session as noted above  Pt reminded to ask wife to bring shoes with insert to work on ambulation  Pt educated on role of OT and POC      Patient left HOB elevated with all lines intact, call button in reach, and nurse notified    GOALS:   Multidisciplinary Problems       Occupational Therapy Goals          Problem: Occupational Therapy    Goal Priority Disciplines Outcome Interventions   Occupational Therapy Goal     OT, PT/OT Progressing    Description: Goals to be met by: 12/24/24     Patient will increase functional independence with ADLs by performing:    UE Dressing with Modified [place].  LE Dressing with Modified [place] and Assistive Devices as needed.  Grooming while standing at sink with Modified [place].  Toileting from toilet with Modified [place] for hygiene and clothing management.   Toilet transfer to toilet with Modified [place].                         History:     Past Medical History:   Diagnosis Date    Coronary artery disease     MI (myocardial infarction)    Diabetes mellitus     Neuropathy - Retinopathy - PAD    Glaucoma     High cholesterol     Hypertension          Past Surgical History:   Procedure Laterality Date    DEBRIDEMENT OF ULCER WITH APPLICATION OF SKIN GRAFT Left 8/26/2022    Procedure: DEBRIDEMENT, ULCER, WITH SKIN GRAFT APPLICATION;  Surgeon: Dilip Horner MD;  Location: [facility];  Service: General;  Laterality: Left;    FOOT AMPUTATION THROUGH METATARSAL Left 04/28/2020     REVISION, AMPUTATION, LOWER EXTREMITY Left 7/17/2023    Procedure: REVISION, AMPUTATION, LEFT TMA STUMP;  Surgeon: Dilip Horner MD;  Location: [facility] OR;  Service: General;  Laterality: Left;    WOUND DEBRIDEMENT Left 05/23/2022    Procedure: DEBRIDEMENT, WOUND;  Surgeon: Dilip Horner MD;  Location: [facility] OR;  Service: General;  Laterality: Left;    WOUND DEBRIDEMENT Left 7/17/2023    Procedure: DEBRIDEMENT, WOUND left tma stump;  Surgeon: Dilip Horner MD;  Location: [facility] OR;  Service: General;  Laterality: Left;       Time Tracking:     OT Date of Treatment: 11/24/24  OT Start Time: 1107  OT Stop Time: 1128  OT Total Time (min): 21 min Coeval with PT    Billable Minutes:Evaluation 10  Therapeutic Activity 11    11/24/2024

## 2024-11-24 NOTE — PROGRESS NOTES
Therapy with vancomycin complete and/or consult discontinued by provider.  Pharmacy will sign off, please re-consult as needed.    Jerrod Sanford PharmD, BCCCP  [phone]

## 2024-11-24 NOTE — NURSING
Report received from DO Galicia. Pt up to floor, VS taken and recorded. Family at bedside. Pt oriented to room, call light and personal items within reach. Advised pt to call for assistance, pt verbalized understanding.

## 2024-11-24 NOTE — PROGRESS NOTES
[facility] - Intensive Care  General Surgery  Progress Note    Subjective:     History of Present Illness:  64M here with multiple days of abdominal pain. It has been much worse today. He has nausea and vomiting. Work-up revealed CT scan with free air.. He is also in DKA with surgar >500 initially. He has been started on an insulin ggt, given IV abx, and an IV PPI. He has never had abodminal surgery. He smokes 1/2 pack a day. He take naproxen but not every day. No evelyn or stroke. Walks around without issue. Independent.  Surgery consulted for free.     CT read: Findings compatible with perforated viscus with mild to moderate intraperitoneal free air and small amount of fluid more so in the upper abdomen and on the left. There is a round air-filled structure possibly representing a duodenal diverticulum. The possibility of peptic ulcer involving the adjacent gastric antrum is considered but it looks somewhat atypical for this diagnosis.     No white count but with left shift. . Elevated lactate. Elevated trops. LOW He is tachycardic to 129 but normal BP.     Post-Op Info:  Procedure(s) (LRB):  LAPAROTOMY, EXPLORATORY (N/A)   1 Day Post-Op     Interval History: K normalized, trops down trended, glucose well controlled, pain controlled. Ng tube with beign output. Drain with moderate SS output. Awaiting CBC.     Medications:  Continuous Infusions:   D5 and 0.45% NaCl   Intravenous Continuous PRN   Stopped at 11/23/24 0933    lactated ringers   Intravenous Continuous 100 mL/hr at 11/24/24 0502 Rate Verify at 11/24/24 0502     Scheduled Meds:   ceFEPime IV (PEDS and ADULTS)  2 g Intravenous Q12H    enoxparin  40 mg Subcutaneous Q24H (prophylaxis, 1700)    fluconazole (DIFLUCAN) IV (PEDS and ADULTS)  400 mg Intravenous Q24H    insulin glargine U-100  7 Units Subcutaneous Daily    metroNIDAZOLE IV (PEDS and ADULTS)  500 mg Intravenous Q8H    mupirocin   Nasal BID    pantoprazole  40 mg Intravenous BID    vancomycin (VANCOCIN)  IV (PEDS and ADULTS)  1,750 mg Intravenous Q24H     PRN Meds:  Current Facility-Administered Medications:     [COMPLETED] calcium gluconate IVPB, 1 g, Intravenous, Once **AND** calcium gluconate IVPB, 1 g, Intravenous, Q10 Min PRN    dextrose 10%, 12.5 g, Intravenous, PRN    dextrose 10%, 25 g, Intravenous, PRN    D5 and 0.45% NaCl, , Intravenous, Continuous PRN    [COMPLETED] dextrose 50%, 50 g, Intravenous, Once **AND** dextrose 50%, 25 g, Intravenous, PRN **AND** [COMPLETED] insulin regular, 10 Units, Intravenous, Once    HYDROmorphone, 0.5 mg, Intravenous, Q3H PRN    ondansetron, 4 mg, Intravenous, Q6H PRN    sodium chloride 0.9%, 10 mL, Intravenous, PRN    Pharmacy to dose Vancomycin consult, , , Once **AND** vancomycin - pharmacy to dose, , Intravenous, pharmacy to manage frequency     Review of patient's allergies indicates:   Allergen Reactions    Penicillins Rash     Objective:     Vital Signs (Most Recent):  Temp: 98.6 °F (37 °C) (11/24/24 0315)  Pulse: 100 (11/24/24 0345)  Resp: 18 (11/24/24 0345)  BP: 113/66 (11/24/24 0345)  SpO2: 100 % (11/24/24 0345) Vital Signs (24h Range):  Temp:  [98.2 °F (36.8 °C)-99.5 °F (37.5 °C)] 98.6 °F (37 °C)  Pulse:  [] 100  Resp:  [8-39] 18  SpO2:  [72 %-100 %] 100 %  BP: ()/(40-87) 113/66     Weight: 103 kg (227 lb)  Body mass index is 28.37 kg/m².    Intake/Output - Last 3 Shifts         11/22 0700 11/23 0659 11/23 0700 11/24 0659 11/24 0700 11/25 0659    P.O.  130     I.V. (mL/kg) 435.5 (4.2) 2323.9 (22.6)     IV Piggyback 3808.2 2173.8     Total Intake(mL/kg) 4243.7 (41.2) 4627.7 (44.9)     Urine (mL/kg/hr) 1200 1075 (0.4)     Drains 395 875     Total Output 1595 1950     Net +2648.7 +2677.7                     Physical Exam  Vitals reviewed.   Constitutional:       General: He is not in acute distress.  HENT:      Head: Normocephalic and atraumatic.      Nose: Nose normal.   Eyes:      General:         Right eye: No discharge.         Left eye: No  discharge.   Cardiovascular:      Rate and Rhythm: Normal rate and regular rhythm.   Pulmonary:      Effort: Pulmonary effort is normal. No respiratory distress.      Breath sounds: No stridor.   Abdominal:      Comments: Soft, ND  aTTP  Ng tube in place   Incisons with dressing in place  RUQ drain benign with SS output    Musculoskeletal:         General: Normal range of motion.      Cervical back: Normal range of motion.   Skin:     General: Skin is warm and dry.      Capillary Refill: Capillary refill takes 2 to 3 seconds.   Neurological:      General: No focal deficit present.      Mental Status: He is alert and oriented to person, place, and time.   Psychiatric:         Mood and Affect: Mood normal.         Behavior: Behavior normal.          Significant Labs:  I have reviewed all pertinent lab results within the past 24 hours.    Significant Diagnostics:  I have reviewed all pertinent imaging results/findings within the past 24 hours.  Assessment/Plan:     * Free intraperitoneal air  64M here with abdominal pain found to be in DKA and has CT scan with free air. Exam consistent with peritonitis. He is now s/p ex-lap with omental patch of gastric perforation on 11/23.     Will see what CBC is this am.    Ng tube, NPO, mIVF for at least 3-4 days. Contrast study prior to NG removal.   No need for TPN at this point   Broad spec IV abx with Fluc. BID IV Protonix. H. Pylori ab  Blood cultures with possible contaminant? Repeat cx.   Trops down trend and echo unremarkable. Tachycardia improving.   Ottoniel improving  and K normal. Trend and repeat labs.   Continue strict glucose control for post-op ulcer healing and wound healing   Rest of care per ICU   DVT ppx   Please call with questions or worsening of condition .               Jourdan Avalos MD  General Surgery  [facility] - Intensive Care

## 2024-11-24 NOTE — PLAN OF CARE
Pt AAOx4. Afebrile overnight. BP stable throughout night.  Sats remained >90 on RA. UOP of 950 mL with weaver. NG output of 350 mL; ANALIA drain of 170 mL. ML incision with island border dressing. Pain controlled with IV tylenol. LR gtt  @100 mL/hr. Frequent checks for safety done during shift. Wife at bedside. Report given to AM RN.      Problem: Adult Inpatient Plan of Care  Goal: Plan of Care Review  Outcome: Progressing  Goal: Patient-Specific Goal (Individualized)  Outcome: Progressing  Goal: Absence of Hospital-Acquired Illness or Injury  Outcome: Progressing  Goal: Optimal Comfort and Wellbeing  Outcome: Progressing  Goal: Readiness for Transition of Care  Outcome: Progressing     Problem: Wound  Goal: Optimal Coping  Outcome: Progressing  Goal: Optimal Functional Ability  Outcome: Progressing  Goal: Absence of Infection Signs and Symptoms  Outcome: Progressing  Goal: Improved Oral Intake  Outcome: Progressing  Goal: Optimal Pain Control and Function  Outcome: Progressing  Goal: Skin Health and Integrity  Outcome: Progressing  Goal: Optimal Wound Healing  Outcome: Progressing     Problem: Skin Injury Risk Increased  Goal: Skin Health and Integrity  Outcome: Progressing     Problem: Fall Injury Risk  Goal: Absence of Fall and Fall-Related Injury  Outcome: Progressing     Problem: Infection  Goal: Absence of Infection Signs and Symptoms  Outcome: Progressing

## 2024-11-24 NOTE — PLAN OF CARE
VN note: pt transferred from icu.progress notes, labs and vital signs reviewed. Will be available to intervene if needed.

## 2024-11-24 NOTE — PLAN OF CARE
Problem: Physical Therapy  Goal: Physical Therapy Goal  Description: Goals to be met by: 2024     Patient will increase functional independence with mobility by performin. Supine to sit with Modified [place]  2. Sit to supine with Modified [place]  3. Sit to stand transfer with Modified [place]  4. Gait  x 150 feet with Modified [place] using LRAD.   5. Stand for 15 minutes with Modified [place] using LRAD    Outcome: Progressing    Patient agreeable to participate in evaluation on this date and co evaluated for safety  Patient was able to sit on EOB with Min A. He showed good sitting balance but did have a slumped seated posture secondary to fatigue. Limited left ankle active mobility and strength noted only 3/5 into DF on the left side. His BP and HR continued to vary as he was sitting up so after Objective measures were taken, he was returned to a supine position with Min A. HOB elevated for NG tube with increased drainage after mobility was performed. LIT pending step and stair completion, as patient as 6-7 steps to enter home.

## 2024-11-24 NOTE — PLAN OF CARE
Recommendation:  1. When PICC line placed, initate TNP of Clinamix 5/15 + daily IVFE at 10mL/hr advancing to goal rate of 90mL/hr to provide 2033kcal, 108 g protein, 2160 mL FW.  2. As GI function improves, advance to clear liquid diet.  3. Monitor weight/labs  4. RD to follow to monitor nutrition status    Goals:  Pt to meet % EEN/EPN by RD follow-up

## 2024-11-24 NOTE — SUBJECTIVE & OBJECTIVE
Interval History: K normalized, trops down trended, glucose well controlled, pain controlled. Ng tube with beign output. Drain with moderate SS output. Awaiting CBC.     Medications:  Continuous Infusions:   D5 and 0.45% NaCl   Intravenous Continuous PRN   Stopped at 11/23/24 0933    lactated ringers   Intravenous Continuous 100 mL/hr at 11/24/24 0502 Rate Verify at 11/24/24 0502     Scheduled Meds:   ceFEPime IV (PEDS and ADULTS)  2 g Intravenous Q12H    enoxparin  40 mg Subcutaneous Q24H (prophylaxis, 1700)    fluconazole (DIFLUCAN) IV (PEDS and ADULTS)  400 mg Intravenous Q24H    insulin glargine U-100  7 Units Subcutaneous Daily    metroNIDAZOLE IV (PEDS and ADULTS)  500 mg Intravenous Q8H    mupirocin   Nasal BID    pantoprazole  40 mg Intravenous BID    vancomycin (VANCOCIN) IV (PEDS and ADULTS)  1,750 mg Intravenous Q24H     PRN Meds:  Current Facility-Administered Medications:     [COMPLETED] calcium gluconate IVPB, 1 g, Intravenous, Once **AND** calcium gluconate IVPB, 1 g, Intravenous, Q10 Min PRN    dextrose 10%, 12.5 g, Intravenous, PRN    dextrose 10%, 25 g, Intravenous, PRN    D5 and 0.45% NaCl, , Intravenous, Continuous PRN    [COMPLETED] dextrose 50%, 50 g, Intravenous, Once **AND** dextrose 50%, 25 g, Intravenous, PRN **AND** [COMPLETED] insulin regular, 10 Units, Intravenous, Once    HYDROmorphone, 0.5 mg, Intravenous, Q3H PRN    ondansetron, 4 mg, Intravenous, Q6H PRN    sodium chloride 0.9%, 10 mL, Intravenous, PRN    Pharmacy to dose Vancomycin consult, , , Once **AND** vancomycin - pharmacy to dose, , Intravenous, pharmacy to manage frequency     Review of patient's allergies indicates:   Allergen Reactions    Penicillins Rash     Objective:     Vital Signs (Most Recent):  Temp: 98.6 °F (37 °C) (11/24/24 0315)  Pulse: 100 (11/24/24 0345)  Resp: 18 (11/24/24 0345)  BP: 113/66 (11/24/24 0345)  SpO2: 100 % (11/24/24 0345) Vital Signs (24h Range):  Temp:  [98.2 °F (36.8 °C)-99.5 °F (37.5 °C)] 98.6  °F (37 °C)  Pulse:  [] 100  Resp:  [8-39] 18  SpO2:  [72 %-100 %] 100 %  BP: ()/(40-87) 113/66     Weight: 103 kg (227 lb)  Body mass index is 28.37 kg/m².    Intake/Output - Last 3 Shifts         11/22 0700  11/23 0659 11/23 0700  11/24 0659 11/24 0700 11/25 0659    P.O.  130     I.V. (mL/kg) 435.5 (4.2) 2323.9 (22.6)     IV Piggyback 3808.2 2173.8     Total Intake(mL/kg) 4243.7 (41.2) 4627.7 (44.9)     Urine (mL/kg/hr) 1200 1075 (0.4)     Drains 395 875     Total Output 1595 1950     Net +2648.7 +2677.7                     Physical Exam  Vitals reviewed.   Constitutional:       General: He is not in acute distress.  HENT:      Head: Normocephalic and atraumatic.      Nose: Nose normal.   Eyes:      General:         Right eye: No discharge.         Left eye: No discharge.   Cardiovascular:      Rate and Rhythm: Normal rate and regular rhythm.   Pulmonary:      Effort: Pulmonary effort is normal. No respiratory distress.      Breath sounds: No stridor.   Abdominal:      Comments: Soft, ND  aTTP  Ng tube in place   Incisons with dressing in place  RUQ drain benign with SS output    Musculoskeletal:         General: Normal range of motion.      Cervical back: Normal range of motion.   Skin:     General: Skin is warm and dry.      Capillary Refill: Capillary refill takes 2 to 3 seconds.   Neurological:      General: No focal deficit present.      Mental Status: He is alert and oriented to person, place, and time.   Psychiatric:         Mood and Affect: Mood normal.         Behavior: Behavior normal.          Significant Labs:  I have reviewed all pertinent lab results within the past 24 hours.    Significant Diagnostics:  I have reviewed all pertinent imaging results/findings within the past 24 hours.

## 2024-11-24 NOTE — PLAN OF CARE
Problem: Occupational Therapy  Goal: Occupational Therapy Goal  Description: Goals to be met by: 12/24/24     Patient will increase functional independence with ADLs by performing:    UE Dressing with Modified [place].  LE Dressing with Modified [place] and Assistive Devices as needed.  Grooming while standing at sink with Modified [place].  Toileting from toilet with Modified [place] for hygiene and clothing management.   Toilet transfer to toilet with Modified [place].    Outcome: Progressing     Pt was agreeable to and participated in OT/PT evaluation.  Pt reports that he was mod I with func mobility and ADLs at [facility].  Pt completed his evaluation and noted to require setup - mod A with ADLS and CGA-min A with bed mobility.  Standing not assessed due to  wound on pt's L foot - awaiting family to bring shoe with insert to further eval.  Goals established to assist pt with returning to [facility] regarding ADLs and func mobility.  Pt will benefit from skilled OT services in order to increase his level of safety and independence with ADLs and mobility.      Amie Gentile, OT  11/24/2024

## 2024-11-24 NOTE — CONSULTS
[facility] Consult - Resident Note    Primary Attending Physician: Dr. Carrion  Primary Team: General Surgery  Consultant Attending: Dr. Arellano  Consultant Resident: Skylar Aguilar    Reason for Consult:     Glucose control, LOW, and other comorbidities    Subjective:      History of Present Illness:  Al Anna is a 64 y.o.  male who has a past medical history of poorly-controlled T2DM c/b neuropathy and retinopathy, CKD-IIIb, HTN, HLD, and PAD c/b STI of L foot s/p partial amputation. The patient presented to the [facility] on 11/22/2024 with a primary complaint of abdominal pain.     Patient was in his usual state of health until the morning of 11/22 when he began experiencing severe, sharp abdominal pain that he localizes to his LUQ with radiation across the epigastrium to his RUQ. He rated the pain as a 10/10. Xray was positive for free intraperitoneal air, and surgery was consulted. He went to the ED for ex lap overnight 11/22. Patient was also in DKA at that time which has since resolved. Patient stepping down from ICU today, and surgery has taken over as primary. [facility] medicine will continue to follow as a consultant.     Past Medical History:  Past Medical History:   Diagnosis Date    Coronary artery disease     MI (myocardial infarction)    Diabetes mellitus     Neuropathy - Retinopathy - PAD    Glaucoma     High cholesterol     Hypertension        Past Surgical History:  Past Surgical History:   Procedure Laterality Date    DEBRIDEMENT OF ULCER WITH APPLICATION OF SKIN GRAFT Left 8/26/2022    Procedure: DEBRIDEMENT, ULCER, WITH SKIN GRAFT APPLICATION;  Surgeon: Dilip Horner MD;  Location: [facility] OR;  Service: General;  Laterality: Left;    FOOT AMPUTATION THROUGH METATARSAL Left 04/28/2020    REVISION, AMPUTATION, LOWER EXTREMITY Left 7/17/2023    Procedure: REVISION, AMPUTATION, LEFT TMA STUMP;  Surgeon: Dilip Horner MD;  Location: [facility] OR;  Service:  General;  Laterality: Left;    WOUND DEBRIDEMENT Left 05/23/2022    Procedure: DEBRIDEMENT, WOUND;  Surgeon: Dilip Horner MD;  Location: [facility] OR;  Service: General;  Laterality: Left;    WOUND DEBRIDEMENT Left 7/17/2023    Procedure: DEBRIDEMENT, WOUND left tma stump;  Surgeon: Dilip Horner MD;  Location: [facility] OR;  Service: General;  Laterality: Left;       Allergies:  Review of patient's allergies indicates:   Allergen Reactions    Penicillins Rash       Medications:   In-Hospital Scheduled Medications:   acetaminophen  1,000 mg Intravenous Q8H    ceFEPime IV (PEDS and ADULTS)  2 g Intravenous Q12H    enoxparin  40 mg Subcutaneous Q24H (prophylaxis, 1700)    fluconazole (DIFLUCAN) IV (PEDS and ADULTS)  400 mg Intravenous Q24H    insulin glargine U-100  7 Units Subcutaneous Daily    metroNIDAZOLE IV (PEDS and ADULTS)  500 mg Intravenous Q8H    mupirocin   Nasal BID    pantoprazole  40 mg Intravenous BID      In-Hospital PRN Medications:    Current Facility-Administered Medications:     [COMPLETED] calcium gluconate IVPB, 1 g, Intravenous, Once **AND** calcium gluconate IVPB, 1 g, Intravenous, Q10 Min PRN    dextrose 10%, 12.5 g, Intravenous, PRN    dextrose 10%, 25 g, Intravenous, PRN    D5 and 0.45% NaCl, , Intravenous, Continuous PRN    [COMPLETED] dextrose 50%, 50 g, Intravenous, Once **AND** dextrose 50%, 25 g, Intravenous, PRN **AND** [COMPLETED] insulin regular, 10 Units, Intravenous, Once    glucagon (human recombinant), 1 mg, Intramuscular, PRN    HYDROmorphone, 0.5 mg, Intravenous, Q3H PRN    insulin aspart U-100, 0-5 Units, Subcutaneous, Q6H PRN    labetalol, 10 mg, Intravenous, Q6H PRN    ondansetron, 4 mg, Intravenous, Q6H PRN    sodium chloride 0.9%, 10 mL, Intravenous, PRN   In-Hospital IV Infusion Medications:   D5 and 0.45% NaCl   Intravenous Continuous PRN   Stopped at 11/23/24 0933    lactated ringers   Intravenous Continuous 100 mL/hr at 11/24/24 1019 Rate Verify at 11/24/24 1019       Home Medications:  Prior to Admission medications    Medication Sig Start Date End Date Taking? Authorizing Provider   dapagliflozin propanediol (FARXIGA) 10 mg tablet TAKE 1 TABLET BY MOUTH ONCE DAILY FOR DIABETES 9/29/23  Yes Smith Olmos MD   gabapentin (NEURONTIN) 100 MG capsule Take 3 capsules (300 mg total) by mouth every evening. 9/17/24 9/17/25 Yes Smith Olmos MD   ibuprofen (ADVIL,MOTRIN) 800 MG tablet Take 800 mg by mouth 3 (three) times daily.   Yes Provider, Historical   lisinopriL-hydrochlorothiazide (PRINZIDE,ZESTORETIC) 20-25 mg Tab Take 1 tablet by mouth once daily 10/2/23  Yes Smith Olmos MD   meclizine (ANTIVERT) 25 mg tablet TAKE 1 TABLET BY MOUTH THREE TIMES DAILY AS NEEDED FOR DIZZINESS 9/24/23  Yes mSith Olmos MD   metFORMIN (GLUCOPHAGE-XR) 500 MG ER 24hr tablet Take 1,000 mg by mouth 2 (two) times daily. 4/3/24  Yes Provider, Historical   ondansetron (ZOFRAN-ODT) 4 MG TbDL Take 1 tablet (4 mg total) by mouth every 6 (six) hours as needed (Nausea). 3/5/24  Yes Smith Olmos MD   pioglitazone (ACTOS) 45 MG tablet Take 1 tablet (45 mg total) by mouth once daily. 9/17/24  Yes Smith Olmos MD   rosuvastatin (CRESTOR) 5 MG tablet Take 1 tablet by mouth once daily 5/6/24  Yes Smith Olmos MD   semaglutide (OZEMPIC) 2 mg/dose (8 mg/3 mL) PnIj Inject 2 mg into the skin every 7 days. 8/2/24 8/2/25 Yes Smith Olmos MD   traMADoL (ULTRAM) 50 mg tablet Take 1 tablet (50 mg total) by mouth 3 (three) times daily as needed for Pain. 9/17/24  Yes Smith Olmos MD   traZODone (DESYREL) 100 MG tablet Take 1 tablet (100 mg total) by mouth every evening. 3/7/24 3/7/25 Yes Smith Olmos MD   acetaminophen (TYLENOL) 500 MG tablet Take 1,000 mg by mouth every 6 (six) hours as needed for Pain.    Provider, Historical   ciprofloxacin HCl (CIPRO) 500 MG tablet Take 1 tablet (500 mg total) by mouth every 12  (twelve) hours. 24   Dilip Horner MD   doxycycline (VIBRA-TABS) 100 MG tablet Take 1 tablet (100 mg total) by mouth 2 (two) times daily. 24   Dilip Horner MD   gentamicin (GARAMYCIN) 0.1 % ointment Apply topically 3 (three) times daily. 24   Dilip Horner MD   HYDROcodone-acetaminophen (NORCO) 5-325 mg per tablet Take 1 tablet by mouth every 6 (six) hours as needed for Pain. 23   Dilip Horner MD   mupirocin (BACTROBAN) 2 % ointment Apply topically 3 (three) times daily. Apply locally every other day 24   Dilip Horner MD   SANTYL ointment Apply topically once daily. Apply locally evru other day. 24   Dilip Horner MD   triamcinolone acetonide 0.1% (KENALOG) 0.1 % paste SMARTSIG:Patch(s) Topical 4 Times Daily 23   Provider, Historical       Family History:  Family History   Problem Relation Name Age of Onset    Heart disease Father         Social History:  Social History     Tobacco Use    Smoking status: Every Day     Current packs/day: 0.50     Average packs/day: 0.5 packs/day for 45.1 years (22.5 ttl pk-yrs)     Types: Cigarettes     Start date: 10/28/1979    Smokeless tobacco: Never   Substance Use Topics    Alcohol use: Yes     Alcohol/week: 4.0 standard drinks of alcohol     Types: 4 Cans of beer per week    Drug use: No       Review of Systems:  Pertinent items are noted in HPI. All other systems are reviewed and are negative.     Health Maintenance:   PCP: Smiht Olmos MD    Immunizations:   Tdap: status unknown  Flu: UTD, last administered 2024  Pna: never administered  Shingrix: never administered  COVID-19: UTD, last administered 2024     Cancer Screening:  Colonoscopy: screening colonoscopy never performed but Cologuard negative on 23  LDCT Chest: never performed     Objective:   Last 24 Hour Vital Signs:  BP  Min: 83/48  Max: 181/99  Temp  Av.7 °F (37.1 °C)  Min: 98.2 °F (36.8 °C)  Max: 99.5 °F  "(37.5 °C)  Pulse  Av.7  Min: 91  Max: 126  Resp  Av.2  Min: 14  Max: 39  SpO2  Av.8 %  Min: 92 %  Max: 100 %  Height  Av' 3" (190.5 cm)  Min: 6' 3" (190.5 cm)  Max: 6' 3" (190.5 cm)  Weight  Av kg (227 lb)  Min: 103 kg (227 lb)  Max: 103 kg (227 lb)  I/O last 3 completed shifts:  In: 9127.8 [P.O.:130; I.V.:2856; IV Piggyback:6141.9]  Out: 4035 [Urine:2685; Drains:1350]    Physical Examination:  Constitutional: Awake, alert, no acute distress, SUMP in place in left nare  Neurological: Alert and oriented, no focal deficits  Head/Neck: Full range of motion, no tenderness or stiffness  Cardiovascular: Regular rate and rhythm, no murmur  Pulmonary: Lungs clear, equal breath sounds  Abdominal: Soft, non tender, normal bowel sounds  Musculoskeletal: Full range of motion, no edema      Laboratory Results:  Most Recent Data:  CBC:   Lab Results   Component Value Date    WBC 10.45 2024    HGB 12.9 (L) 2024    HCT 39.9 (L) 2024     2024    MCV 86 2024    RDW 15.7 (H) 2024     BMP:   Lab Results   Component Value Date     2024    K 3.9 2024     (H) 2024    CO2 18 (L) 2024    BUN 31 (H) 2024     (H) 2024    CALCIUM 8.6 (L) 2024    MG 1.9 2024    PHOS 3.1 2024     LFTs:   Lab Results   Component Value Date    PROT 5.6 (L) 2024    ALBUMIN 1.9 (L) 2024    BILITOT 0.3 2024    AST 19 2024    ALKPHOS 58 2024    ALT 11 2024     Coags:   Lab Results   Component Value Date    INR 0.9 2013     FLP:   Lab Results   Component Value Date    CHOL 150 2024    HDL 44 2024    LDLCALC 87.0 2024    TRIG 95 2024    CHOLHDL 29.3 2024     DM:   Lab Results   Component Value Date    HGBA1C 10.7 (H) 2024    HGBA1C 8.0 (H) 2024    HGBA1C 8.1 (H) 2023    LDLCALC 87.0 2024    CREATININE 1.5 (H) 2024     Thyroid: " "  Lab Results   Component Value Date    TSH 1.879 02/01/2022     Anemia: No results found for: "IRON", "TIBC", "FERRITIN", "[id number]", "FOLATE"  Cardiac:   Lab Results   Component Value Date    TROPONINI 0.862 (H) 11/23/2024    BNP 37 06/25/2021     Urinalysis:   Lab Results   Component Value Date    COLORU Orange (A) 11/23/2024    SPECGRAV >1.030 (A) 11/23/2024    NITRITE Negative 11/23/2024    KETONESU Negative 11/23/2024    UROBILINOGEN Negative 11/23/2024       Trended Lab Data:  Recent Labs   Lab 11/22/24  1811 11/22/24  1902 11/22/24  2245 11/23/24  0452 11/23/24  0453 11/23/24  0944 11/23/24  1731 11/23/24  2149 11/24/24  0525 11/24/24  0547   WBC 11.26  --   --   --  8.22  8.22  --   --   --  10.45  --    HGB 16.1  --   --   --  13.9*  13.9*  --   --   --  12.9*  --    HCT 50.4  --   --   --  42.2  42.2  --   --   --  39.9*  --      --   --   --  223  223  --   --   --  168  --    MCV 87  --   --   --  85  85  --   --   --  86  --    RDW 15.3*  --   --   --  15.2*  15.2*  --   --   --  15.7*  --    NA  --    < > 139   < > 145  145   < > 142 141  --  141   K  --    < > 4.5   < > 3.5  3.5   < > 3.5 3.7  --  3.9   CL  --    < > 104   < > 112*  112*   < > 113* 112*  --  114*   CO2  --    < > 16*   < > 18*  18*   < > 18* 19*  --  18*   BUN  --    < > 30*   < > 32*  32*   < > 33* 32*  --  31*   GLU  --    < > 488*   < > 179*  179*   < > 177* 153*  --  138*   PROT  --    < > 6.7  --  6.3  --   --   --   --  5.6*   ALBUMIN  --    < > 2.7*  --  2.4*  --   --   --   --  1.9*   BILITOT  --    < > 0.5  --  0.3  --   --   --   --  0.3   AST  --    < > 14  --  20  --   --   --   --  19   ALKPHOS  --    < > 67  --  60  --   --   --   --  58   ALT  --    < > 9*  --  11  --   --   --   --  11    < > = values in this interval not displayed.       Trended Cardiac Data:  Recent Labs   Lab 11/22/24  2245 11/23/24  0452 11/23/24  1202   TROPONINI 0.560* 1.160* 0.862*       Microbiology Data:  Abdominal " surgery cultures pending   Blood culture: 1/2 +staphylococcus, likely a contaminant     Other Results:  EKG (my interpretation): sinus tachycardia     Radiology:  TTE 11/23/24:     Left Ventricle: The left ventricle is normal in size. Mildly increased wall thickness. There is concentric remodeling. Unable to assess wall motion. Endocardium not well visualized however systolic function appears preserved.  Consider repeat imaging with echo enhancing agent if indicated Diastolic function cannot be reliably determined in the presence of mitral annular calcification.    Right Ventricle: Normal right ventricular cavity size. Systolic function is normal.    Aortic Valve: There is mild stenosis. Aortic valve peak velocity is 2.2 m/s. Mean gradient is 10.5 mmHg. The dimensionless index is 0.60.    Mitral Valve: There is mild regurgitation.    Chest Xray 11/22/24: Free air beneath the diaphragm again noted as described on CT.No appreciable acute lung parenchymal or pleural abnormality as imaged    CTAP 11/22/24:   Findings compatible with perforated viscus with mild to moderate intraperitoneal free air and small amount of fluid more so in the upper abdomen and on the left.  There is a round air-filled structure possibly representing a duodenal diverticulum.  The possibility of peptic ulcer involving the adjacent gastric antrum is considered but it looks somewhat atypical for this diagnosis.  No other acute findings involving the bowel to suggest etiology or location of the ruptured viscus.  Appendix is normal. No significant acute focal visceral lesions as imaged without IV contrast.  Prostatomegaly.  Severe L4-L5 spondylosis.       Assessment:     Al Anna is a 64 y.o. male with history of poorly-controlled T2DM c/b neuropathy and retinopathy, CKD-IIIb, HTN, HLD, and PAD c/b STI of L foot s/p partial amputation who presented to [facility] on 11/22/2024 with a primary complaint of sudden onset abdominal  pain that started early yesterday morning. He was found to be in DKA, with CT A/P highly concerning for bowel perforation. Admitted to the ICU s/p ex lap with gastric perforation repair. Stepped down from ICU today. Primary team now general surgery and [facility] medicine following as a consultant.      Plan:     Perforated gastric ulcer s/p omental patch repair  Patient presented with complaint of acute onset of severe abdominal pain localized to upper abdomen that started at approximately 0430 morning of presentation with accompanying vomiting and sweating. He was tachycardic but afebrile and normotensive. Initial LA 3.1 in ED. CT A/P demonstrated mild/moderate intraperitoneal free air and small volume of fluid mainly localized to left upper abdomen with possible duodenal diverticulum and peptic ulcer of gastric antrum also appreciated. Given NS 1.5 L and Ceftriaxone 2 g IV in ED as well as Dilaudid 0.5 mg  - continued management per primary (surgery)   - strict NPO with NG tube to suction  - surgical cultures pending; blood culture +strap, likely a contaminate   - on Cefepime, flagyl, fluconazole. Vanc discontinued   - pain control per primary  - will start TPN per nutrition since patient will have prolonged course of being NPO  - nutrition consulted to assist with TPN    Poorly-controlled Diabetes Mellitus, Type II, w/o Insulin Use c/b Neuropathy, Retinopathy, and Current Diabetic Ketoacidosis, resolved   Patient presented with serum glucose of 520, HCO3 of 13 with anion gap of 24, and beta hydroxybutyrate of 2.3 all consistent with DKA. He denies any previous hx of DKA. Although his T2DM is not well controlled, his current DKA was likely induced by his suspected bowel perforation. Current home regimen includes Metformin 1000 mg bid, Actos 45 mg daily, Dapagliflozin 10 mg daily, and Gabapentin 300 mg qhs.  - continue home Lantus 7 U + SSI   - glucose checks q4- will adjust pending TPN, appreciate nutrition's assistance    - Can resume home Gabapentin once cleared for PO intake     Acute Kidney Injury on Chronic Kidney Disease, Stage III-b  Hyperkalemia  Patient has a hx of CKD with baseline Cr of 1.7-1.9 and GFR ~40 but presented with Cr of 2.5 and GFR of 28. Initial CrCl 45 mL/min. Etiology likely pre-renal.  Plan:  - FENa 0.2 indicating pre renal etiology, creatinine improving  - Renally dose meds as indicated  - Avoid nephrotoxins as able  - Will plan to refer to Nephrology upon discharge to establish care  - hyperkalemia multifactorial in the setting of LOW, DKA, NG tube to suction which are all affecting electrolytes  - HyperK as result of lab error      Elevated Troponin  Patient presented with initial troponin elevation at 0.046. Extremely low concern for ACS at this time as EKG is non-concerning and mild troponin elevation is most likely secondary to stress given significant abdominal pain.  Plan:  - peaked at 1.160, likely stress induced in the setting of bowel perforation  - continue to monitor for chest pain and changes on tele     Peripheral Arterial Disease c/b Soft Tissue Infection of Left Foot s/p Partial Amputation and Arterial Stenting of Left Leg  Mixed Hyperlipidemia  Patient underwent trans-metatarsal amputation of left foot and arterial stenting of left leg in 02/2020 after developing an infection and followed along for an extended course of time with Dr. Horner due to poor wound healing with long-term abx therapy. He is currently prescribed Rosuvastatin 5 mg daily.  - Resume Rosuvastatin once cleared for PO intake  - Continue to  patient on importance of smoking cessation     Primary Hypertension  Current home regimen is Lisinopril-HCTZ 20-25 mg daily. BP stable upon admission.  Plan:  - Hold home Lisinopril-HCTZ for now as BP is normotensive but on the softer side in setting of likely bowel perforation  - Restart home meds as indicated     Current Tobacco Use  Patient states that he started smoking at  age 17 with an average of 1 ppd until recently cutting down to 0.5 ppd. He has an approximate 45 pack year hx but has never received LDCT Chest for lung cancer screening.  Plan:  - Continue to  patient on importance of smoking cessation  - Needs LDCT Chest as it has never been performed and he is an active smoker with significant pack year hx. Discuss with PCP on discharge.      Lumbar Radiculopathy  Patient currently prescribed Gabapentin 300 mg qhs and Tramadol 50 mg TID prn.  Plan:  - Can resume home Gabapentin once cleared for PO intak    Thank you for allowing us to participate in the care of this patient. Please contact me at [phone] or secure chat if you have any questions regarding this consult.    Skylar Aguilar  \[facility]\"" Internal Medicine, HO-1  Pager number: [phone]

## 2024-11-24 NOTE — PROGRESS NOTES
"[facility] Hospital Medicine Progress Note    Primary Team: [facility] Hospitalist Team B   Attending Physician: Neil Garcia MD  Resident: Sydni  Intern: Lila    Subjective:      Pt reports pain controlled on current regimen, dilaudid PRN.  Reports no other  symptoms at this time. NG output 300cc, ANALIA 170cc, unremarkable appearance. Blood cx 1/2 positive, staph PCR+ likely contaminant.      Objective:     Last 24 Hour Vital Signs:  BP  Min: 70/46  Max: 150/80  Temp  Av.7 °F (37.1 °C)  Min: 98.2 °F (36.8 °C)  Max: 99.5 °F (37.5 °C)  Pulse  Av.8  Min: 94  Max: 126  Resp  Av.9  Min: 8  Max: 39  SpO2  Av.6 %  Min: 72 %  Max: 100 %  Height  Av' 3" (190.5 cm)  Min: 6' 3" (190.5 cm)  Max: 6' 3" (190.5 cm)  Weight  Av kg (227 lb)  Min: 103 kg (227 lb)  Max: 103 kg (227 lb)  I/O last 3 completed shifts:  In: 9127.8 [P.O.:130; I.V.:2856; IV Piggyback:6141.9]  Out: 4035 [Urine:2685; Drains:1350]    Physical Examination:  Constitutional: Awake, alert, no acute distress, SUMP in place in left nare  Neurological: Alert and oriented, no focal deficits  Head/Neck: Full range of motion, no tenderness or stiffness  Cardiovascular: Regular rate and rhythm, no murmur  Pulmonary: Lungs clear, equal breath sounds  Abdominal: Soft, non tender, normal bowel sounds  Musculoskeletal: Full range of motion, no edema    Laboratory:  Laboratory Data Reviewed:   Pertinent Findings:  WBC 8.22>10.4  Hgb 13.9> 12.9   Plt 223> 168   K 3.9  CO2 18  Creatinine 1.5   Glucose 179  Lactic Acid 1.9   Urine studies pending    Microbiology Data Reviewed:   Pertinent Findings:  Abdominal surgery cultures pending  Blood cultures pending    Other Results:  EKG (my interpretation):   Sinus tachycardia, rate 138. No ST elevations. Possible Left atrial enlargement    Radiology Data Reviewed:   Pertinent Findings:  CT ABD/PELVIS :  Impression:     Findings compatible with perforated viscus with mild to moderate intraperitoneal free air " and small amount of fluid more so in the upper abdomen and on the left.  There is a round air-filled structure possibly representing a duodenal diverticulum.  The possibility of peptic ulcer involving the adjacent gastric antrum is considered but it looks somewhat atypical for this diagnosis.     No other acute findings involving the bowel to suggest etiology or location of the ruptured viscus.  Appendix is normal.     No significant acute focal visceral lesions as imaged without IV contrast.     Prostatomegaly.     Severe L4-L5 spondylosis. Hemangioma at L2.     CXR 11/22:  Impression:     Free air beneath the diaphragm again noted as described on CT.     No appreciable acute lung parenchymal or pleural abnormality as imaged.    Current Medications:     Infusions:   D5 and 0.45% NaCl   Intravenous Continuous PRN   Stopped at 11/23/24 0933    lactated ringers   Intravenous Continuous 100 mL/hr at 11/24/24 0853 New Bag at 11/24/24 0853        Scheduled:   acetaminophen  1,000 mg Intravenous Q8H    ceFEPime IV (PEDS and ADULTS)  2 g Intravenous Q12H    enoxparin  40 mg Subcutaneous Q24H (prophylaxis, 1700)    fluconazole (DIFLUCAN) IV (PEDS and ADULTS)  400 mg Intravenous Q24H    insulin glargine U-100  7 Units Subcutaneous Daily    metroNIDAZOLE IV (PEDS and ADULTS)  500 mg Intravenous Q8H    mupirocin   Nasal BID    pantoprazole  40 mg Intravenous BID    [START ON 11/25/2024] vancomycin (VANCOCIN) IV (PEDS and ADULTS)  1,750 mg Intravenous Q24H        PRN:    Current Facility-Administered Medications:     [COMPLETED] calcium gluconate IVPB, 1 g, Intravenous, Once **AND** calcium gluconate IVPB, 1 g, Intravenous, Q10 Min PRN    dextrose 10%, 12.5 g, Intravenous, PRN    dextrose 10%, 25 g, Intravenous, PRN    D5 and 0.45% NaCl, , Intravenous, Continuous PRN    [COMPLETED] dextrose 50%, 50 g, Intravenous, Once **AND** dextrose 50%, 25 g, Intravenous, PRN **AND** [COMPLETED] insulin regular, 10 Units, Intravenous,  Once    HYDROmorphone, 0.5 mg, Intravenous, Q3H PRN    ondansetron, 4 mg, Intravenous, Q6H PRN    sodium chloride 0.9%, 10 mL, Intravenous, PRN    Pharmacy to dose Vancomycin consult, , , Once **AND** vancomycin - pharmacy to dose, , Intravenous, pharmacy to manage frequency    Antibiotics and Day Number of Therapy:  Rocephin 11/22  Cefepime 11/23-present  Diflucan 11/23-present  Metronidazole 11/23-present  Vanc 11/23    Lines and Day Number of Therapy:  PIV x2 11/22  Jose 11/23    Assessment/Plan:   Al Anna is a 64 y.o. male with history of poorly-controlled T2DM c/b neuropathy and retinopathy, CKD-IIIb, HTN, HLD, and PAD c/b STI of L foot s/p partial amputation who presented to [facility] on 11/22/2024 with a primary complaint of sudden onset abdominal pain that started early yesterday morning. He was found to be in DKA, with CT A/P  highly concerning for bowel perforation. Admitted to the ICU s/p ex lap with gastric perforation repair.     Perforated gastric ulcer s/p omental patch repair  Patient presented with complaint of acute onset of severe abdominal pain localized to upper abdomen that started at approximately 0430 morning of presentation with accompanying vomiting and sweating. He was tachycardic but afebrile and normotensive. Initial LA 3.1 in ED. CT A/P demonstrated mild/moderate intraperitoneal free air and small volume of fluid mainly localized to left upper abdomen with possible duodenal diverticulum and peptic ulcer of gastric antrum also appreciated. Given NS 1.5 L and Ceftriaxone 2 g IV in ED as well as Dilaudid 0.5 mg  Plan:  - strict NPO with NG tube to suction  - continue Cefepime 2 g IV q12h (renally dosed)  - continue Metronidazole 500 mg IV q8h  - continue Vancomycin  - Continue Dilaudid 0.5 mg q6h prn for pain control; can up-titrate as needed  - will start TPN per nutrition since patient will have prolonged course of being NPO  - nutrition consulted to assist with  TPN     Poorly-controlled Diabetes Mellitus, Type II, w/o Insulin Use c/b Neuropathy, Retinopathy, and Current Diabetic Ketoacidosis, resolved   Patient presented with serum glucose of 520, HCO3 of 13 with anion gap of 24, and beta hydroxybutyrate of 2.3 all consistent with DKA. He denies any previous hx of DKA. Although his T2DM is not well controlled, his current DKA was likely induced by his suspected bowel perforation. Current home regimen includes Metformin 1000 mg bid, Actos 45 mg daily, Dapagliflozin 10 mg daily, and Gabapentin 300 mg qhs.  Plan:  - continue home Lantus 7 U + SSI   - glucose checks q4- will adjust pending TPN   - Can resume home Gabapentin once cleared for PO intake     Acute Kidney Injury on Chronic Kidney Disease, Stage III-b  Hyperkalemia  Patient has a hx of CKD with baseline Cr of 1.7-1.9 and GFR ~40 but presented with Cr of 2.5 and GFR of 28. Initial CrCl 45 mL/min. Etiology likely pre-renal.  Plan:  - FENa 0.2 indicating pre renal etiology, creatinine improving  - Renally dose meds as indicated  - Avoid nephrotoxins as able  - Continue to monitor on labs  - Will plan to refer to Nephrology upon discharge to establish care  - hyperkalemia multifactorial in the setting of LOW, DKA, NG tube to suction which are all affecting electrolytes  - HyperK as result of lab error   Plan:   - daily CMPs   - monitor UO      Elevated Troponin  Patient presented with initial troponin elevation at 0.046. Extremely low concern for ACS at this time as EKG is non-concerning and mild troponin elevation is most likely secondary to stress given significant abdominal pain.  Plan:  - peaked at 1.160, likely stress induced in the setting of bowel perforation  - continue to monitor for chest pain and changes on tele     Peripheral Arterial Disease c/b Soft Tissue Infection of Left Foot s/p Partial Amputation and Arterial Stenting of Left Leg  Patient underwent trans-metatarsal amputation of left foot and arterial  stenting of left leg in 02/2020 after developing an infection and followed along for an extended course of time with Dr. Horner due to poor wound healing with long-term abx therapy. He is currently prescribed Rosuvastatin 5 mg daily.  Plan:  - Resume Rosuvastatin once cleared for PO intake  - Continue to  patient on importance of smoking cessation     Primary Hypertension  Current home regimen is Lisinopril-HCTZ 20-25 mg daily. BP stable upon admission.  Plan:  - Hold home Lisinopril-HCTZ for now as BP is normotensive but on the softer side in setting of likely bowel perforation  - Restart home meds as indicated  - Continue to routinely monitor vitals     Mixed Hyperlipidemia  Patient currently takes Rosuvastatin 5 mg daily at home.   Plan:  - Resume home Rosuvastatin once cleared for PO intake     Current Tobacco Use  Patient states that he started smoking at age 17 with an average of 1 ppd until recently cutting down to 0.5 ppd. He has an approximate 45 pack year hx but has never received LDCT Chest for lung cancer screening.  Plan:  - Continue to  patient on importance of smoking cessation  - Needs LDCT Chest as it has never been performed and he is an active smoker with significant pack year hx      Lumbar Radiculopathy  Patient currently prescribed Gabapentin 300 mg qhs and Tramadol 50 mg TID prn.  Plan:  - Can resume home Gabapentin once cleared for PO intake     Healthcare Maintenance  Plan:  - Influenza and COVID-19 immunizations are UTD  - Screening colonoscopy never performed but Cologuard negative on 12/09/23  - Patient unsure of Tdap status and unable to pull from [facility] registry  - Needs PCV-20  - Needs LDCT Chest as it has never been performed and he is an active smoker with significant pack year hx      Code Status: Full Code (confirmed with patient)  VTE Ppx: Lovenox  Diet: Strict NPO, start TPN per nutrition      Disposition: step-down to floor as DKA resolved   Transfer to surgery as  medically stable, will continue to follow as consult        Estimated LOS: per primary    Carmel Goodwin MD  [facility] Internal Medicine/Pediatrics HO-2    [facility] Medicine Hospitalist Pager numbers:   [facility] Hospitalist Medicine Team A (Lorena/Hope): [phone]  [facility] Hospitalist Medicine Team B (Marvin/Radha):  [phone]

## 2024-11-24 NOTE — NURSING
PT disconnected from ICU monitors, placed on telemonitor. VSS, no acute events noted. All belongings sent w/ patient and family to room 505.

## 2024-11-24 NOTE — PT/OT/SLP EVAL
Physical Therapy Evaluation    Patient Name:  Al Anna   MRN:  [id number]    Recommendations:     Discharge Recommendations: Low Intensity Therapy   Discharge Equipment Recommendations: other (see comments) (may benefit from walker -)   Barriers to discharge: None    Assessment:     Al Anna is a 64 y.o. male admitted with a medical diagnosis of Free intraperitoneal air.  He presents with the following impairments/functional limitations: weakness, impaired endurance, impaired functional mobility, gait instability, impaired balance, decreased lower extremity function, decreased ROM, impaired coordination Patient agreeable to participate in evaluation on this date and co evaluated for safety  Patient was able to sit on EOB with Min A. He showed good sitting balance but did have a slumped seated posture secondary to fatigue. Limited left ankle active mobility and strength noted only 3/5 into DF on the left side. His BP and HR continued to vary as he was sitting up so after Objective measures were taken, he was returned to a supine position with Min A. HOB elevated for NG tube with increased drainage after mobility was performed. LIT pending step and stair completion, as patient as 6-7 steps to enter home..    Rehab Prognosis: Good; patient would benefit from acute skilled PT services to address these deficits and reach maximum level of function.    Recent Surgery: Procedure(s) (LRB):  LAPAROTOMY, EXPLORATORY (N/A) 1 Day Post-Op    Plan:     During this hospitalization, patient to be seen 4 x/week to address the identified rehab impairments via gait training, therapeutic activities, therapeutic exercises, neuromuscular re-education and progress toward the following goals:    Plan of Care Expires:  12/22/24    Subjective     Chief Complaint: no complaints  Patient/Family Comments/goals: none present  Pain/Comfort:  Pain Rating 1: 0/10    Patients cultural, spiritual, Taoist conflicts given the current situation:  no    Living Environment:  Lives with wife in H with 6 ZANA with bilateral rails, side entry with 7 ZANA with 2 rails  Prior to admission, patients level of function was indep.  Equipment used at home: cane, straight.  DME owned (not currently used): none.  Upon discharge, patient will have assistance from family.    Objective:     Communicated with nsg prior to session.  Patient found supine with blood pressure cuff, pulse ox (continuous), ANALIA drain, NG tube, weaver catheter, telemetry  upon PT entry to room.    General Precautions: Standard, fall  Orthopedic Precautions:    Braces: N/A  Respiratory Status: Room air    Exams:  Gross Motor Coordination:  WFL  Skin Integrity/Edema:  -       Skin integrity: incision on L foot with mild drainage  RLE ROM: WFL  RLE Strength: WFL  LLE ROM: Deficits: min DF present  LLE Strength: WFL except DF 3/5    Functional Mobility:  Bed Mobility:     Rolling Left:  contact guard assistance  Rolling Right: contact guard assistance  Supine to Sit: minimum assistance  Sit to Supine: minimum assistance      AM-PAC 6 CLICK MOBILITY  Total Score:18       Treatment & Education:   PT educated patient:  PT plan of care/role of PT  Safety with OOB mobility- call staff  Use of RW for household and community ambulation. - will continue to try with walker once more mobile  Energy conservation techniques   Discharge disposition  - LIT pending stairs  Pt  verbalized understanding       Patient left supine with call button in reach and RN notified.    GOALS:   Multidisciplinary Problems       Physical Therapy Goals          Problem: Physical Therapy    Goal Priority Disciplines Outcome Interventions   Physical Therapy Goal     PT, PT/OT Progressing    Description: Goals to be met by: 2024     Patient will increase functional independence with mobility by performin. Supine to sit with Modified [place]  2. Sit to supine with Modified [place]  3. Sit to stand transfer with  Modified [place]  4. Gait  x 150 feet with Modified [place] using LRAD.   5. Stand for 15 minutes with Modified [place] using LRAD                   Multidisciplinary Problems (Resolved)          Problem: Physical Therapy    Goal Priority Disciplines Outcome Interventions   Physical Therapy Goal   (Resolved)     PT, PT/OT Met           Problem: Physical Therapy    Goal Priority Disciplines Outcome Interventions   Physical Therapy Goal   (Resolved)     PT, PT/OT Met    Description: Pt with no acute care PT needs                       History:     Past Medical History:   Diagnosis Date    Coronary artery disease     MI (myocardial infarction)    Diabetes mellitus     Neuropathy - Retinopathy - PAD    Glaucoma     High cholesterol     Hypertension        Past Surgical History:   Procedure Laterality Date    DEBRIDEMENT OF ULCER WITH APPLICATION OF SKIN GRAFT Left 8/26/2022    Procedure: DEBRIDEMENT, ULCER, WITH SKIN GRAFT APPLICATION;  Surgeon: Dilip Horner MD;  Location: [facility] OR;  Service: General;  Laterality: Left;    FOOT AMPUTATION THROUGH METATARSAL Left 04/28/2020    REVISION, AMPUTATION, LOWER EXTREMITY Left 7/17/2023    Procedure: REVISION, AMPUTATION, LEFT TMA STUMP;  Surgeon: Dilip Horner MD;  Location: [facility] OR;  Service: General;  Laterality: Left;    WOUND DEBRIDEMENT Left 05/23/2022    Procedure: DEBRIDEMENT, WOUND;  Surgeon: Dilip Horner MD;  Location: [facility] OR;  Service: General;  Laterality: Left;    WOUND DEBRIDEMENT Left 7/17/2023    Procedure: DEBRIDEMENT, WOUND left tma stump;  Surgeon: Dilip Horner MD;  Location: [facility];  Service: General;  Laterality: Left;       Time Tracking:     PT Received On: 11/24/24  PT Start Time: 1108     PT Stop Time: 1128  PT Total Time (min): 20 min     Billable Minutes: Evaluation 10 and Therapeutic Activity 10      11/24/2024

## 2024-11-24 NOTE — CONSULTS
"  [facility] - Intensive Care  Adult Nutrition  Consult Note    SUMMARY     Recommendations    Recommendation:  1. When PICC line placed, initate TNP of Clinamix 5/15 + daily IVFE at 10mL/hr advancing to goal rate of 90mL/hr to provide 2033kcal, 108 g protein, 2160 mL FW.  2. As GI function improves, advance to clear liquid diet.  3. Monitor weight/labs  4. RD to follow to monitor nutrition status    Goals:  Pt to meet % EEN/EPN by RD follow-up  Nutrition Goal Status: new  Communication of RD Recs:  (POC)    Assessment and Plan  Nutrition Problem  Altered GI Function    Related to (etiology):   Free intraperitoneal air    Signs and Symptoms (as evidenced by):   NPO status     Interventions:  Collaboration by nutrition professional with other providers  Parenteral Nutrition Management- TPN of Clinamix 5/15 @ goal rate of 90mL/hr    Nutrition Diagnosis Status:   New    Malnutrition Assessment  Unable to assess NPFE at this time, RD providing remote weekend coverage  Weight Loss (Malnutrition):  (6% weight loss x 6 months)     Reason for Assessment  Reason For Assessment: consult (NPO for bowel perforation, starting TPN)  Diagnosis:  (Free intraperitoneal air)  General Information Comments: Pt presented to ER with abdominal pain, admitted wtih Free intraperitoneal air. Currently NPO, s/p exploratory laparotomy 11/23. Juan 17- incision medial anterior upper quadrant. Unable to assess NFPE at this time, RD providing remote coverage. Recent weight change of 15lb weight loss x 6 months per chart.  Nutrition Discharge Planning: d/c to be determined    Nutrition Risk Screen  Nutrition Risk Screen: no indicators present    Nutrition/Diet History  Factors Affecting Nutritional Intake: abdominal pain, NPO, nausea/vomiting, altered gastrointestinal function    Anthropometrics  Temp: 98.5 °F (36.9 °C)  Height Method: Stated  Height: 6' 3" (190.5 cm)  Height (inches): 75 in  Weight Method: Bed Scale  Weight: 103 kg (227 lb 1.2 " oz)  Weight (lb): 227.08 lb  Ideal Body Weight (IBW), Male: 196 lb  % Ideal Body Weight, Male (lb): 115.86 %  BMI (Calculated): 28.4  BMI Grade: 25 - 29.9 - overweight  Usual Body Weight (UBW), k.8 kg (24)  Weight Change Amount: 14 lb 15.9 oz  % Usual Body Weight: 94  % Weight Change From Usual Weight: -6.19 %     Lab/Procedures/Meds  Pertinent Labs Reviewed: reviewed  Pertinent Labs Comments: chloride 114(H), CO2 18(L), GLucsoe 138(H), BUN 31(H), creatinine 1.5(H), calcium8.6(L), albumin 1.9(L)  Pertinent Medications Reviewed: reviewed  Pertinent Medications Comments: cefipime, enoxaparin, insulin, lactate ringers, pantoprazole    Nutrition Related Social Determinants of Health:  SDOH: Unable to assess at this time.     Estimated/Assessed Needs  Weight Used For Calorie Calculations: 88.9 kg (196 lb) (IBW)  Energy Calorie Requirements (kcal): 2667 kcal (30 kcal/kg IBW)  Energy Need Method: Kcal/kg  Protein Requirements: 106 g (1.2 g/kg IBW)  Weight Used For Protein Calculations: 88.9 kg (196 lb) (IBW)  Estimated Fluid Requirement Method: RDA Method  RDA Method (mL): 2667  CHO Requirement: 300g    Nutrition Prescription Ordered  Current Diet Order: NPO    Evaluation of Received Nutrient/Fluid Intake  I/O: 816.6/0  Energy Calories Required: not meeting needs  Protein Required: not meeting needs  Fluid Required: not meeting needs  Comments: LBM:   Tolerance: not tolerating  % Intake of Estimated Energy Needs: 0 - 25 %  % Meal Intake: NPO    Nutrition Risk  Level of Risk/Frequency of Follow-up: moderate - high (2x weekly)     Monitor and Evaluation  Food and Nutrient Intake: parenteral nutrition intake  Food and Nutrient Adminstration: enteral and parenteral nutrition administration  Physical Activity and Function: nutrition-related ADLs and IADLs  Anthropometric Measurements: weight  Biochemical Data, Medical Tests and Procedures: electrolyte and renal panel, gastrointestinal profile, glucose/endocrine  profile, inflammatory profile  Nutrition-Focused Physical Findings: overall appearance     Nutrition Follow-Up  RD Follow-up?: Yes

## 2024-11-25 ENCOUNTER — CLINICAL SUPPORT (OUTPATIENT)
Dept: SMOKING CESSATION | Facility: CLINIC | Age: 64
End: 2024-11-25
Payer: COMMERCIAL

## 2024-11-25 DIAGNOSIS — F17.210 CIGARETTE SMOKER: Primary | ICD-10-CM

## 2024-11-25 LAB
ACID FAST MOD KINY STN SPEC: NORMAL
ALBUMIN SERPL BCP-MCNC: 1.9 G/DL (ref 3.5–5.2)
ALP SERPL-CCNC: 65 U/L (ref 40–150)
ALT SERPL W/O P-5'-P-CCNC: 10 U/L (ref 10–44)
ANION GAP SERPL CALC-SCNC: 10 MMOL/L (ref 8–16)
AST SERPL-CCNC: 16 U/L (ref 10–40)
BACTERIA BLD CULT: ABNORMAL
BACTERIA SPEC AEROBE CULT: ABNORMAL
BASOPHILS # BLD AUTO: 0.02 K/UL (ref 0–0.2)
BASOPHILS NFR BLD: 0.2 % (ref 0–1.9)
BILIRUB SERPL-MCNC: 0.3 MG/DL (ref 0.1–1)
BUN SERPL-MCNC: 25 MG/DL (ref 8–23)
CALCIUM SERPL-MCNC: 8.6 MG/DL (ref 8.7–10.5)
CHLORIDE SERPL-SCNC: 113 MMOL/L (ref 95–110)
CO2 SERPL-SCNC: 19 MMOL/L (ref 23–29)
CREAT SERPL-MCNC: 1.2 MG/DL (ref 0.5–1.4)
DIFFERENTIAL METHOD BLD: ABNORMAL
EOSINOPHIL # BLD AUTO: 0.5 K/UL (ref 0–0.5)
EOSINOPHIL NFR BLD: 5.1 % (ref 0–8)
ERYTHROCYTE [DISTWIDTH] IN BLOOD BY AUTOMATED COUNT: 15.6 % (ref 11.5–14.5)
EST. GFR  (NO RACE VARIABLE): >60 ML/MIN/1.73 M^2
GLUCOSE SERPL-MCNC: 87 MG/DL (ref 70–110)
H PYLORI IGG SERPL QL IA: POSITIVE
HCT VFR BLD AUTO: 33.1 % (ref 40–54)
HGB BLD-MCNC: 11 G/DL (ref 14–18)
IMM GRANULOCYTES # BLD AUTO: 0.12 K/UL (ref 0–0.04)
IMM GRANULOCYTES NFR BLD AUTO: 1.2 % (ref 0–0.5)
LYMPHOCYTES # BLD AUTO: 1.3 K/UL (ref 1–4.8)
LYMPHOCYTES NFR BLD: 13.1 % (ref 18–48)
MAGNESIUM SERPL-MCNC: 2 MG/DL (ref 1.6–2.6)
MCH RBC QN AUTO: 28.3 PG (ref 27–31)
MCHC RBC AUTO-ENTMCNC: 33.2 G/DL (ref 32–36)
MCV RBC AUTO: 85 FL (ref 82–98)
MONOCYTES # BLD AUTO: 0.6 K/UL (ref 0.3–1)
MONOCYTES NFR BLD: 5.4 % (ref 4–15)
MYCOBACTERIUM SPEC QL CULT: NORMAL
NEUTROPHILS # BLD AUTO: 7.6 K/UL (ref 1.8–7.7)
NEUTROPHILS NFR BLD: 75 % (ref 38–73)
NRBC BLD-RTO: 0 /100 WBC
OHS QRS DURATION: 102 MS
OHS QRS DURATION: 88 MS
OHS QTC CALCULATION: 439 MS
OHS QTC CALCULATION: 441 MS
PHOSPHATE SERPL-MCNC: 2.6 MG/DL (ref 2.7–4.5)
PLATELET # BLD AUTO: 179 K/UL (ref 150–450)
PMV BLD AUTO: 11.5 FL (ref 9.2–12.9)
POCT GLUCOSE: 83 MG/DL (ref 70–110)
POCT GLUCOSE: 93 MG/DL (ref 70–110)
POCT GLUCOSE: 97 MG/DL (ref 70–110)
POTASSIUM SERPL-SCNC: 3.5 MMOL/L (ref 3.5–5.1)
PROT SERPL-MCNC: 5.6 G/DL (ref 6–8.4)
RBC # BLD AUTO: 3.89 M/UL (ref 4.6–6.2)
SODIUM SERPL-SCNC: 142 MMOL/L (ref 136–145)
WBC # BLD AUTO: 10.12 K/UL (ref 3.9–12.7)

## 2024-11-25 PROCEDURE — 85025 COMPLETE CBC W/AUTO DIFF WBC: CPT

## 2024-11-25 PROCEDURE — 63600175 PHARM REV CODE 636 W HCPCS

## 2024-11-25 PROCEDURE — 94761 N-INVAS EAR/PLS OXIMETRY MLT: CPT

## 2024-11-25 PROCEDURE — 83735 ASSAY OF MAGNESIUM: CPT

## 2024-11-25 PROCEDURE — 97530 THERAPEUTIC ACTIVITIES: CPT | Mod: CO

## 2024-11-25 PROCEDURE — 36415 COLL VENOUS BLD VENIPUNCTURE: CPT

## 2024-11-25 PROCEDURE — 63600175 PHARM REV CODE 636 W HCPCS: Mod: JZ,JG

## 2024-11-25 PROCEDURE — 80053 COMPREHEN METABOLIC PANEL: CPT

## 2024-11-25 PROCEDURE — 63600175 PHARM REV CODE 636 W HCPCS: Performed by: STUDENT IN AN ORGANIZED HEALTH CARE EDUCATION/TRAINING PROGRAM

## 2024-11-25 PROCEDURE — 94799 UNLISTED PULMONARY SVC/PX: CPT | Mod: XB

## 2024-11-25 PROCEDURE — 25000003 PHARM REV CODE 250

## 2024-11-25 PROCEDURE — 99900035 HC TECH TIME PER 15 MIN (STAT)

## 2024-11-25 PROCEDURE — 84100 ASSAY OF PHOSPHORUS: CPT

## 2024-11-25 PROCEDURE — 21400001 HC TELEMETRY ROOM

## 2024-11-25 RX ORDER — CEFEPIME HYDROCHLORIDE 1 G/1
2 INJECTION, POWDER, FOR SOLUTION INTRAMUSCULAR; INTRAVENOUS
Status: COMPLETED | OUTPATIENT
Start: 2024-11-25 | End: 2024-11-26

## 2024-11-25 RX ORDER — ACETAMINOPHEN 10 MG/ML
1000 INJECTION, SOLUTION INTRAVENOUS EVERY 8 HOURS
Status: DISCONTINUED | OUTPATIENT
Start: 2024-11-25 | End: 2024-11-26

## 2024-11-25 RX ADMIN — CEFEPIME 2 G: 1 INJECTION, POWDER, FOR SOLUTION INTRAMUSCULAR; INTRAVENOUS at 10:11

## 2024-11-25 RX ADMIN — CEFEPIME 2 G: 1 INJECTION, POWDER, FOR SOLUTION INTRAMUSCULAR; INTRAVENOUS at 01:11

## 2024-11-25 RX ADMIN — MUPIROCIN: 20 OINTMENT TOPICAL at 09:11

## 2024-11-25 RX ADMIN — CEFEPIME 2 G: 2 INJECTION, POWDER, FOR SOLUTION INTRAVENOUS at 04:11

## 2024-11-25 RX ADMIN — PANTOPRAZOLE SODIUM 40 MG: 40 INJECTION, POWDER, FOR SOLUTION INTRAVENOUS at 09:11

## 2024-11-25 RX ADMIN — METRONIDAZOLE 500 MG: 500 INJECTION, SOLUTION INTRAVENOUS at 11:11

## 2024-11-25 RX ADMIN — METRONIDAZOLE 500 MG: 500 INJECTION, SOLUTION INTRAVENOUS at 03:11

## 2024-11-25 RX ADMIN — ACETAMINOPHEN 1000 MG: 10 INJECTION INTRAVENOUS at 11:11

## 2024-11-25 RX ADMIN — SODIUM CHLORIDE, POTASSIUM CHLORIDE, SODIUM LACTATE AND CALCIUM CHLORIDE: 600; 310; 30; 20 INJECTION, SOLUTION INTRAVENOUS at 10:11

## 2024-11-25 RX ADMIN — METRONIDAZOLE 500 MG: 500 INJECTION, SOLUTION INTRAVENOUS at 10:11

## 2024-11-25 RX ADMIN — INSULIN GLARGINE 7 UNITS: 100 INJECTION, SOLUTION SUBCUTANEOUS at 09:11

## 2024-11-25 RX ADMIN — FLUCONAZOLE 400 MG: 2 INJECTION, SOLUTION INTRAVENOUS at 04:11

## 2024-11-25 RX ADMIN — ENOXAPARIN SODIUM 40 MG: 40 INJECTION SUBCUTANEOUS at 05:11

## 2024-11-25 RX ADMIN — ACETAMINOPHEN 1000 MG: 10 INJECTION INTRAVENOUS at 06:11

## 2024-11-25 RX ADMIN — HYDROMORPHONE HYDROCHLORIDE 0.5 MG: 2 INJECTION, SOLUTION INTRAMUSCULAR; INTRAVENOUS; SUBCUTANEOUS at 03:11

## 2024-11-25 RX ADMIN — POTASSIUM PHOSPHATE, MONOBASIC AND POTASSIUM PHOSPHATE, DIBASIC 15 MMOL: 224; 236 INJECTION, SOLUTION, CONCENTRATE INTRAVENOUS at 09:11

## 2024-11-25 RX ADMIN — PANTOPRAZOLE SODIUM 40 MG: 40 INJECTION, POWDER, FOR SOLUTION INTRAVENOUS at 10:11

## 2024-11-25 NOTE — PLAN OF CARE
Problem: Adult Inpatient Plan of Care  Goal: Plan of Care Review  Outcome: Progressing  Goal: Absence of Hospital-Acquired Illness or Injury  Outcome: Progressing  Goal: Optimal Comfort and Wellbeing  Outcome: Progressing     Problem: Wound  Goal: Optimal Coping  Outcome: Progressing  Goal: Optimal Functional Ability  Outcome: Progressing  Goal: Absence of Infection Signs and Symptoms  Outcome: Progressing  Goal: Improved Oral Intake  Outcome: Progressing  Goal: Optimal Pain Control and Function  Outcome: Progressing  Goal: Skin Health and Integrity  Outcome: Progressing  Goal: Optimal Wound Healing  Outcome: Progressing     Problem: Skin Injury Risk Increased  Goal: Skin Health and Integrity  Outcome: Progressing     Problem: Fall Injury Risk  Goal: Absence of Fall and Fall-Related Injury  Outcome: Progressing     Problem: Infection  Goal: Absence of Infection Signs and Symptoms  Outcome: Progressing     Pt AAOx4. PRN dilaudid given for c/o pain. PRN zofran given for c/o nausea. Medications administered per MAR. LR infusing @ 100cc/hr. On RA. L nare NGT to LIWS with small amount of bilious output. Remains NPO. Abdominal ANALIA drain to bulb suction with serosanguineous drainage. Midline incision PAVITHRA with staples intact. Cardiac monitoring maintained. Blood glucose monitored. Voiding spontaneously. Bed alarm set, side rails raised, and call light in reach. Spouse at bedside.

## 2024-11-25 NOTE — PT/OT/SLP PROGRESS
"Occupational Therapy   Treatment    Name: Al Anna  MRN: [id number]  Admitting Diagnosis:  Free intraperitoneal air  2 Days Post-Op    Recommendations:     Discharge Recommendations: Low Intensity Therapy  Discharge Equipment Recommendations:  other (see comments) (may benefit from walker)  Barriers to discharge:  Inaccessible home environment    Assessment:     Al Anna is a 64 y.o. male with a medical diagnosis of Free intraperitoneal air. Performance deficits affecting function are weakness, impaired endurance, impaired functional mobility, gait instability, impaired balance, decreased lower extremity function, decreased safety awareness, pain, decreased ROM, impaired coordination.     Rehab Prognosis:  Good; patient would benefit from acute skilled OT services to address these deficits and reach maximum level of function.       Plan:     Patient to be seen 3 x/week to address the above listed problems via self-care/home management, therapeutic activities, therapeutic exercises  Plan of Care Expires: 12/24/24  Plan of Care Reviewed with: patient, spouse    Subjective     Chief Complaint: "I'm hungry and thirsty and all I want is some water"  Patient/Family Comments/goals: Pt wants to eat and drink something  Pain/Comfort:  Pain Rating 1: 0/10 (0/10 at start)  Location - Side 1: Bilateral  Pain Addressed 1: Reposition, Distraction, Nurse notified  Pain Rating Post-Intervention 1: 5/10 (4-5/10 after session)    Objective:     Communicated with: Emilia Leahy prior to session.  Patient found left sidelying with bed alarm, ANALIA drain, telemetry, NG tube upon OT entry to room.    General Precautions: Standard, fall    Orthopedic Precautions:N/A  Braces: N/A  Respiratory Status: Room air     Occupational Performance:     Bed Mobility:    Patient completed Rolling/Turning to Right with contact guard assistance and with side rail  Patient completed Scooting/Bridging with contact guard assistance and with side " rail  Patient completed Supine to Sit with contact guard assistance and with side rail  Patient completed Sit to Supine with contact guard assistance and with side rail     Functional Mobility/Transfers:  Patient completed Sit <> Stand Transfer with minimum assistance  with  no assistive device   Pt performed x3 trials  Stand 1: Nelson with gait belt on, side rails, HHA, and no AD  Stand 2: Nelson with gait belt on, side rails, HHA, and no AD  Stand 3: Nelson using RW  VC's for hand placement and up-right posture during stance; Therapist encouraged pt to use RW while standing for better stability and posture; Pt agreeable and stance improved on 3rd attempt    Activities of Daily Living:  Upper Body Dressing: minimum assistance jeny gown as robe    [facility] 6 Click ADL:      Treatment & Education:  Educated pt on purpose/role OT  Pt's wife, Cassia, stated she is unable to find inserts for pt's shoes; Unable to try to ambulate with shoes on this date  Pt agreeable to stand 2/2 to not sleeping well and able to tolerate 3x as noted above  Mobility as above    Patient left HOB elevated with all lines intact, call button in reach, bed alarm on, nsg notified, and wife, Cassia present    GOALS:   Multidisciplinary Problems       Occupational Therapy Goals          Problem: Occupational Therapy    Goal Priority Disciplines Outcome Interventions   Occupational Therapy Goal     OT, PT/OT Progressing    Description: Goals to be met by: 12/24/24     Patient will increase functional independence with ADLs by performing:    UE Dressing with Modified Chana.  LE Dressing with Modified Chana and Assistive Devices as needed.  Grooming while standing at sink with Modified Chana.  Toileting from toilet with Modified Chana for hygiene and clothing management.   Toilet transfer to toilet with Modified Chana.                         Time Tracking:     OT Date of Treatment: 11/24/24  OT Start Time: 0939  OT Stop Time:  1005  OT Total Time (min): 26 min    Billable Minutes:Therapeutic Activity 26 11/25/2024

## 2024-11-25 NOTE — DISCHARGE INSTRUCTIONS
No lifting greater than 10 pounds for 6 weeks from day of surgery.  No pushing/pulling such as vacuuming or raking.  No straining, avoid constipation and take stool softeners as described and laxatives as needed.  No driving while on narcotics and until you can react quickly without pain.

## 2024-11-25 NOTE — PROGRESS NOTES
Pharmacist Renal Dose Adjustment Note    Al Anna is a 64 y.o. male being treated with the medication cefepime    Patient Data:    Vital Signs (Most Recent):  Temp: 98.5 °F (36.9 °C) (11/25/24 0711)  Pulse: 87 (11/25/24 0711)  Resp: 18 (Simultaneous filing. User may not have seen previous data.) (11/25/24 0335)  BP: 134/67 (11/25/24 0711)  SpO2: 95 % (11/25/24 0813) Vital Signs (72h Range):  Temp:  [96.2 °F (35.7 °C)-99.5 °F (37.5 °C)]   Pulse:  []   Resp:  [8-39]   BP: ()/(40-99)   SpO2:  [72 %-100 %]      Recent Labs   Lab 11/23/24  2149 11/24/24  0547 11/25/24  0420   CREATININE 1.6* 1.5* 1.2     Serum creatinine: 1.2 mg/dL 11/25/24 0420  Estimated creatinine clearance: 80.8 mL/min    Medication: cefepime 2 g q12 adjusted to 2g q8h    Pharmacist's Name: Audie Jacinto  Pharmacist's Extension: [phone]

## 2024-11-25 NOTE — SUBJECTIVE & OBJECTIVE
Interval History: NAEON. Denies nausea or emesis. Admits pain is well controlled. Overall feels better.    Medications:  Continuous Infusions:   D5 and 0.45% NaCl   Intravenous Continuous PRN   Stopped at 11/23/24 0933    lactated ringers   Intravenous Continuous   Stopped at 11/24/24 1547     Scheduled Meds:   ceFEPime IV (PEDS and ADULTS)  2 g Intravenous Q12H    enoxparin  40 mg Subcutaneous Q24H (prophylaxis, 1700)    fluconazole (DIFLUCAN) IV (PEDS and ADULTS)  400 mg Intravenous Q24H    insulin glargine U-100  7 Units Subcutaneous Daily    metroNIDAZOLE IV (PEDS and ADULTS)  500 mg Intravenous Q8H    mupirocin   Nasal BID    pantoprazole  40 mg Intravenous BID    potassium phosphate IVPB  15 mmol Intravenous Once     PRN Meds:  Current Facility-Administered Medications:     [COMPLETED] calcium gluconate IVPB, 1 g, Intravenous, Once **AND** calcium gluconate IVPB, 1 g, Intravenous, Q10 Min PRN    dextrose 10%, 12.5 g, Intravenous, PRN    dextrose 10%, 25 g, Intravenous, PRN    D5 and 0.45% NaCl, , Intravenous, Continuous PRN    glucagon (human recombinant), 1 mg, Intramuscular, PRN    HYDROmorphone, 0.5 mg, Intravenous, Q3H PRN    insulin aspart U-100, 0-5 Units, Subcutaneous, Q6H PRN    labetalol, 10 mg, Intravenous, Q6H PRN    ondansetron, 4 mg, Intravenous, Q6H PRN    sodium chloride 0.9%, 10 mL, Intravenous, PRN     Review of patient's allergies indicates:   Allergen Reactions    Penicillins Rash     Objective:     Vital Signs (Most Recent):  Temp: 98 °F (36.7 °C) (11/25/24 0335)  Pulse: 91 (11/25/24 0335)  Resp: 18 (Simultaneous filing. User may not have seen previous data.) (11/25/24 0335)  BP: 135/76 (11/25/24 0335)  SpO2: 96 % (11/25/24 0443) Vital Signs (24h Range):  Temp:  [98 °F (36.7 °C)-99.1 °F (37.3 °C)] 98 °F (36.7 °C)  Pulse:  [] 91  Resp:  [16-39] 18  SpO2:  [96 %-100 %] 96 %  BP: (104-181)/(56-99) 135/76     Weight: 103 kg (227 lb 1.2 oz)  Body mass index is 28.38 kg/m².    Intake/Output  - Last 3 Shifts         11/23 0700  11/24 0659 11/24 0700  11/25 0659 11/25 0700 11/26 0659    P.O. 130      I.V. (mL/kg) 2420.5 (23.5) 1481.5 (14.4)     IV Piggyback 2333.7 1291.1     Total Intake(mL/kg) 4884.1 (47.4) 2772.6 (26.9)     Urine (mL/kg/hr) 1485 (0.6) 1450 (0.6)     Drains 955 360     Total Output 2440 1810     Net +2444.1 +962.6                     Physical Exam  Vitals reviewed.   Constitutional:       General: He is not in acute distress.  HENT:      Head: Normocephalic and atraumatic.      Nose: Nose normal.   Eyes:      General:         Right eye: No discharge.         Left eye: No discharge.   Cardiovascular:      Rate and Rhythm: Normal rate and regular rhythm.   Pulmonary:      Effort: Pulmonary effort is normal. No respiratory distress.      Breath sounds: No stridor.   Abdominal:      Comments: Soft, ND  aTTP  Ng tube in place   Incisons with dressing in place  RUQ drain benign with SS output    Musculoskeletal:         General: Normal range of motion.      Cervical back: Normal range of motion.   Skin:     General: Skin is warm and dry.      Capillary Refill: Capillary refill takes less than 2 seconds.   Neurological:      General: No focal deficit present.      Mental Status: He is alert and oriented to person, place, and time.   Psychiatric:         Mood and Affect: Mood normal.         Behavior: Behavior normal.          Significant Labs:  Labs reviewed.    Significant Diagnostics:  No new imaging to review.

## 2024-11-25 NOTE — PLAN OF CARE
Problem: Adult Inpatient Plan of Care  Goal: Plan of Care Review  Outcome: Progressing  Goal: Absence of Hospital-Acquired Illness or Injury  Outcome: Progressing     Problem: Wound  Goal: Absence of Infection Signs and Symptoms  Outcome: Progressing  Goal: Improved Oral Intake  Outcome: Progressing  Goal: Optimal Pain Control and Function  Outcome: Progressing     Problem: Skin Injury Risk Increased  Goal: Skin Health and Integrity  Outcome: Progressing     Problem: Fall Injury Risk  Goal: Absence of Fall and Fall-Related Injury  Outcome: Progressing     Problem: Infection  Goal: Absence of Infection Signs and Symptoms  Outcome: Progressing

## 2024-11-25 NOTE — PROGRESS NOTES
"[facility] Medicine Progress Note    Primary Team: [facility] Hospitalist Team B    Attending Physician: Ghanshyam Arellano MD  Resident: Sydni  Intern: Lila    Subjective:      Vitals remained stable overnight, no acute events reported. Patient reports feeling well this morning, no complaints at this time.     Objective:     Last 24 Hour Vital Signs:  BP  Min: 104/56  Max: 181/99  Temp  Av.4 °F (36.9 °C)  Min: 98 °F (36.7 °C)  Max: 99.1 °F (37.3 °C)  Pulse  Av.4  Min: 87  Max: 106  Resp  Av.8  Min: 16  Max: 39  SpO2  Av.4 %  Min: 95 %  Max: 100 %  Height  Av' 3" (190.5 cm)  Min: 6' 3" (190.5 cm)  Max: 6' 3" (190.5 cm)  Weight  Av kg (227 lb 1.2 oz)  Min: 103 kg (227 lb 1.2 oz)  Max: 103 kg (227 lb 1.2 oz)  I/O last 3 completed shifts:  In: 4336.7 [I.V.:2581; IV Piggyback:1755.7]  Out: 3280 [Urine:2400; Drains:880]    Physical Examination:  Constitutional: Awake, alert, no acute distress  Neurological: Alert and oriented, no focal deficits  Head/Neck: Full range of motion, no tenderness or stiffness, NG tube to suction in place left nare  Cardiovascular: Regular rate and rhythm, no murmur  Pulmonary: Lungs clear, equal breath sounds  Abdominal: Soft, non tender, normal bowel sounds, surgical scars covered  Musculoskeletal: Full range of motion, no edema    Laboratory:  Laboratory Data Reviewed:   Pertinent Findings:  Reviewed, stable    Microbiology Data Reviewed:   Pertinent Findings:  Blood cx 1/2- coag negative staph, likely contaminant    Other Results:  EKG (my interpretation):   Sinus tachycardia, rate 138. No ST elevations. Possible Left atrial enlargement     Echo :    Left Ventricle: The left ventricle is normal in size. Mildly increased wall thickness. There is concentric remodeling. Unable to assess wall motion. Endocardium not well visualized however systolic function appears preserved.  Consider repeat imaging with echo enhancing agent if indicated Diastolic function cannot be " reliably determined in the presence of mitral annular calcification.    Right Ventricle: Normal right ventricular cavity size. Systolic function is normal.    Aortic Valve: There is mild stenosis. Aortic valve peak velocity is 2.2 m/s. Mean gradient is 10.5 mmHg. The dimensionless index is 0.60.    Mitral Valve: There is mild regurgitation.    Radiology Data Reviewed:   Pertinent Findings:  Upper GI pending    Current Medications:     Infusions:   D5 and 0.45% NaCl   Intravenous Continuous PRN   Stopped at 11/23/24 0933    lactated ringers   Intravenous Continuous   Stopped at 11/24/24 1547        Scheduled:   ceFEPime IV (PEDS and ADULTS)  2 g Intravenous Q12H    enoxparin  40 mg Subcutaneous Q24H (prophylaxis, 1700)    fluconazole (DIFLUCAN) IV (PEDS and ADULTS)  400 mg Intravenous Q24H    insulin glargine U-100  7 Units Subcutaneous Daily    metroNIDAZOLE IV (PEDS and ADULTS)  500 mg Intravenous Q8H    mupirocin   Nasal BID    pantoprazole  40 mg Intravenous BID    potassium phosphate IVPB  15 mmol Intravenous Once        PRN:    Current Facility-Administered Medications:     [COMPLETED] calcium gluconate IVPB, 1 g, Intravenous, Once **AND** calcium gluconate IVPB, 1 g, Intravenous, Q10 Min PRN    dextrose 10%, 12.5 g, Intravenous, PRN    dextrose 10%, 25 g, Intravenous, PRN    D5 and 0.45% NaCl, , Intravenous, Continuous PRN    glucagon (human recombinant), 1 mg, Intramuscular, PRN    HYDROmorphone, 0.5 mg, Intravenous, Q3H PRN    insulin aspart U-100, 0-5 Units, Subcutaneous, Q6H PRN    labetalol, 10 mg, Intravenous, Q6H PRN    ondansetron, 4 mg, Intravenous, Q6H PRN    sodium chloride 0.9%, 10 mL, Intravenous, PRN    Antibiotics and Day Number of Therapy:  Vanc  Cefepime  Zosyn    Lines and Day Number of Therapy:  PIV x1 11/22-present    Assessment/Plan:     Al Anna is a 64 y.o. male with history of poorly-controlled T2DM c/b neuropathy and retinopathy, CKD-IIIb, HTN, HLD, and PAD c/b STI of L foot s/p  partial amputation who presented to [facility] on 11/22/2024 with a primary complaint of sudden onset abdominal pain that started early the morning of presentation. He was found to be in DKA, with CT A/P highly concerning for bowel perforation. He had an ex lap with omental flap repair of 1.5cm perforated gastric ulcer, stepped down from ICU 11/24. Admitted with general surgery as primary and consulted to [facility] IM for management of DM2 and LOW.     Perforated gastric ulcer s/p omental patch repair  Patient presented with complaint of acute onset of severe abdominal pain localized to upper abdomen that started at approximately 0430 morning of presentation with accompanying vomiting and sweating. He was tachycardic but afebrile and normotensive. Initial LA 3.1 in ED. CT A/P demonstrated mild/moderate intraperitoneal free air and small volume of fluid mainly localized to left upper abdomen with possible duodenal diverticulum and peptic ulcer of gastric antrum also appreciated. Given NS 1.5 L and Ceftriaxone 2 g IV in ED as well as Dilaudid 0.5 mg  - continued management per primary (surgery)   - strict NPO with NG tube to suction  - surgical cultures pending; blood culture +strap, likely a contaminate   - on Cefepime, flagyl, fluconazole. Vanc discontinued   - pain control per primary  - primary team to start TPN, will monitor glucose closely and adjust SSI as needed     Poorly-controlled Diabetes Mellitus, Type II, w/o Insulin Use c/b Neuropathy, Retinopathy, and Current Diabetic Ketoacidosis, resolved   Patient presented with serum glucose of 520, HCO3 of 13 with anion gap of 24, and beta hydroxybutyrate of 2.3 all consistent with DKA. He denies any previous hx of DKA. Although his T2DM is not well controlled, his current DKA was likely induced by his suspected bowel perforation. Current home regimen includes Metformin 1000 mg bid, Actos 45 mg daily, Dapagliflozin 10 mg daily, and Gabapentin 300 mg  qhs.  - continue home Lantus 7 U + SSI   - glucose checks q4- will adjust pending TPN, appreciate nutrition's assistance   - Can resume home Gabapentin once cleared for PO intake     Acute Kidney Injury on Chronic Kidney Disease, Stage III-b  Hyperkalemia  Patient has a hx of CKD with baseline Cr of 1.7-1.9 and GFR ~40 but presented with Cr of 2.5 and GFR of 28. Initial CrCl 45 mL/min. Etiology likely pre-renal.  Plan:  - FENa 0.2 indicating pre renal etiology, creatinine improving  - Renally dose meds as indicated  - Avoid nephrotoxins as able  - CMP with improving kidney function     Elevated Troponin, resolved  Patient presented with initial troponin elevation at 0.046. Extremely low concern for ACS at this time as EKG is non-concerning and mild troponin elevation is most likely secondary to stress given significant abdominal pain.  Plan:  - peaked at 1.160, likely stress induced in the setting of bowel perforation  - continue to monitor for chest pain and changes on tele     Peripheral Arterial Disease c/b Soft Tissue Infection of Left Foot s/p Partial Amputation and Arterial Stenting of Left Leg  Mixed Hyperlipidemia  Patient underwent trans-metatarsal amputation of left foot and arterial stenting of left leg in 02/2020 after developing an infection and followed along for an extended course of time with Dr. Horner due to poor wound healing with long-term abx therapy. He is currently prescribed Rosuvastatin 5 mg daily.  - Resume Rosuvastatin once cleared for PO intake  - Continue to  patient on importance of smoking cessation     Primary Hypertension  Current home regimen is Lisinopril-HCTZ 20-25 mg daily. BP stable upon admission.  Plan:  - Hold home Lisinopril-HCTZ for now as BP is normotensive but on the softer side in setting of likely bowel perforation  - Restart home meds as indicated     Current Tobacco Use  Patient states that he started smoking at age 17 with an average of 1 ppd until recently  cutting down to 0.5 ppd. He has an approximate 45 pack year hx but has never received LDCT Chest for lung cancer screening.  Plan:  - Continue to  patient on importance of smoking cessation  - Needs LDCT Chest as it has never been performed and he is an active smoker with significant pack year hx. Discuss with PCP on discharge.      Lumbar Radiculopathy  Patient currently prescribed Gabapentin 300 mg qhs and Tramadol 50 mg TID prn.  Plan:  - Can resume home Gabapentin once cleared for PO intake    Ppx: lovenox  Dispo: per primary    Herlinda Troy MD  [facility] Internal Medicine/Pediatrics HO-2    [facility] Medicine Hospitalist Pager numbers:   [facility] Hospitalist Medicine Team A (Lorena/Hope): [phone]  [facility] Hospitalist Medicine Team B (Marvin/Radha):  [phone]

## 2024-11-25 NOTE — PROGRESS NOTES
[facility] Surg  General Surgery  Progress Note    Subjective:     History of Present Illness:  64M here with multiple days of abdominal pain. It has been much worse today. He has nausea and vomiting. Work-up revealed CT scan with free air.. He is also in DKA with surgar >500 initially. He has been started on an insulin ggt, given IV abx, and an IV PPI. He has never had abodminal surgery. He smokes 1/2 pack a day. He take naproxen but not every day. No evelyn or stroke. Walks around without issue. Independent.  Surgery consulted for free.     CT read: Findings compatible with perforated viscus with mild to moderate intraperitoneal free air and small amount of fluid more so in the upper abdomen and on the left. There is a round air-filled structure possibly representing a duodenal diverticulum. The possibility of peptic ulcer involving the adjacent gastric antrum is considered but it looks somewhat atypical for this diagnosis.     No white count but with left shift. . Elevated lactate. Elevated trops. LOW He is tachycardic to 129 but normal BP.     Post-Op Info:  Procedure(s) (LRB):  LAPAROTOMY, EXPLORATORY (N/A)   2 Days Post-Op     Interval History: NAEON. Denies nausea or emesis. Admits pain is well controlled. Overall feels better.    Medications:  Continuous Infusions:   D5 and 0.45% NaCl   Intravenous Continuous PRN   Stopped at 11/23/24 0933    lactated ringers   Intravenous Continuous   Stopped at 11/24/24 1547     Scheduled Meds:   ceFEPime IV (PEDS and ADULTS)  2 g Intravenous Q12H    enoxparin  40 mg Subcutaneous Q24H (prophylaxis, 1700)    fluconazole (DIFLUCAN) IV (PEDS and ADULTS)  400 mg Intravenous Q24H    insulin glargine U-100  7 Units Subcutaneous Daily    metroNIDAZOLE IV (PEDS and ADULTS)  500 mg Intravenous Q8H    mupirocin   Nasal BID    pantoprazole  40 mg Intravenous BID    potassium phosphate IVPB  15 mmol Intravenous Once     PRN Meds:  Current Facility-Administered Medications:      [COMPLETED] calcium gluconate IVPB, 1 g, Intravenous, Once **AND** calcium gluconate IVPB, 1 g, Intravenous, Q10 Min PRN    dextrose 10%, 12.5 g, Intravenous, PRN    dextrose 10%, 25 g, Intravenous, PRN    D5 and 0.45% NaCl, , Intravenous, Continuous PRN    glucagon (human recombinant), 1 mg, Intramuscular, PRN    HYDROmorphone, 0.5 mg, Intravenous, Q3H PRN    insulin aspart U-100, 0-5 Units, Subcutaneous, Q6H PRN    labetalol, 10 mg, Intravenous, Q6H PRN    ondansetron, 4 mg, Intravenous, Q6H PRN    sodium chloride 0.9%, 10 mL, Intravenous, PRN     Review of patient's allergies indicates:   Allergen Reactions    Penicillins Rash     Objective:     Vital Signs (Most Recent):  Temp: 98 °F (36.7 °C) (11/25/24 0335)  Pulse: 91 (11/25/24 0335)  Resp: 18 (Simultaneous filing. User may not have seen previous data.) (11/25/24 0335)  BP: 135/76 (11/25/24 0335)  SpO2: 96 % (11/25/24 0443) Vital Signs (24h Range):  Temp:  [98 °F (36.7 °C)-99.1 °F (37.3 °C)] 98 °F (36.7 °C)  Pulse:  [] 91  Resp:  [16-39] 18  SpO2:  [96 %-100 %] 96 %  BP: (104-181)/(56-99) 135/76     Weight: 103 kg (227 lb 1.2 oz)  Body mass index is 28.38 kg/m².    Intake/Output - Last 3 Shifts         11/23 0700  11/24 0659 11/24 0700 11/25 0659 11/25 0700 11/26 0659    P.O. 130      I.V. (mL/kg) 2420.5 (23.5) 1481.5 (14.4)     IV Piggyback 2333.7 1291.1     Total Intake(mL/kg) 4884.1 (47.4) 2772.6 (26.9)     Urine (mL/kg/hr) 1485 (0.6) 1450 (0.6)     Drains 955 360     Total Output 2440 1810     Net +2444.1 +962.6                     Physical Exam  Vitals reviewed.   Constitutional:       General: He is not in acute distress.  HENT:      Head: Normocephalic and atraumatic.      Nose: Nose normal.   Eyes:      General:         Right eye: No discharge.         Left eye: No discharge.   Cardiovascular:      Rate and Rhythm: Normal rate and regular rhythm.   Pulmonary:      Effort: Pulmonary effort is normal. No respiratory distress.      Breath sounds:  No stridor.   Abdominal:      Comments: Soft, ND  aTTP  Ng tube in place   Incisons with dressing in place  RUQ drain benign with SS output    Musculoskeletal:         General: Normal range of motion.      Cervical back: Normal range of motion.   Skin:     General: Skin is warm and dry.      Capillary Refill: Capillary refill takes less than 2 seconds.   Neurological:      General: No focal deficit present.      Mental Status: He is alert and oriented to person, place, and time.   Psychiatric:         Mood and Affect: Mood normal.         Behavior: Behavior normal.          Significant Labs:  Labs reviewed.    Significant Diagnostics:  No new imaging to review.  Assessment/Plan:     * Free intraperitoneal air  64M here with abdominal pain found to be in DKA and has CT scan with free air. Exam consistent with peritonitis. He is now s/p ex-lap with findings of gastric perforation, aashish patch repair performed on 11/23. Doing well.    NPO  NGT, POD 3 UGI study  mIVF  IV abx  BID PPI  H. Pylori ab  Blood cultures with possible contaminant Repeat cx.   Trops down trend and echo unremarkable  LOW improving and replace K today, Trend and repeat labs.   Continue strict glucose control for post-op ulcer healing and wound healing   DVT ppx         Richie Stover MD  General Surgery  [facility] Surg

## 2024-11-25 NOTE — PROGRESS NOTES
Individual Follow-Up Form    11/25/2024    Quit Date: To be determined    Clinical Status of Patient: Inpatient    Length of Service: 30 minutes    Comments:  Smoking cessation education note: Pt is a 1 pk/day cigarette smoker x 43 yrs. He states that he cut down to 0.5 pk/day approx. 2.5 yrs ago and is ready to quit. Ambulatory referral to Smoking Cessation clinic following hospital discharge.     Diagnosis: F17.210

## 2024-11-25 NOTE — ASSESSMENT & PLAN NOTE
64M here with abdominal pain found to be in DKA and has CT scan with free air. Exam consistent with peritonitis. He is now s/p ex-lap with findings of gastric perforation, aashish patch repair performed on 11/23. Doing well.    NPO  NGT, POD 3 UGI study  mIVF  IV abx  BID PPI  H. Pylori ab  Blood cultures with possible contaminant Repeat cx.   Trops down trend and echo unremarkable  LOW improving and replace K today, Trend and repeat labs.   Continue strict glucose control for post-op ulcer healing and wound healing   DVT ppx

## 2024-11-26 ENCOUNTER — CLINICAL SUPPORT (OUTPATIENT)
Dept: SMOKING CESSATION | Facility: CLINIC | Age: 64
End: 2024-11-26
Payer: COMMERCIAL

## 2024-11-26 DIAGNOSIS — F17.210 CIGARETTE SMOKER: Primary | ICD-10-CM

## 2024-11-26 LAB
ALBUMIN SERPL BCP-MCNC: 1.8 G/DL (ref 3.5–5.2)
ALP SERPL-CCNC: 72 U/L (ref 40–150)
ALT SERPL W/O P-5'-P-CCNC: 11 U/L (ref 10–44)
ANION GAP SERPL CALC-SCNC: 13 MMOL/L (ref 8–16)
AST SERPL-CCNC: 13 U/L (ref 10–40)
BASOPHILS # BLD AUTO: 0.02 K/UL (ref 0–0.2)
BASOPHILS NFR BLD: 0.2 % (ref 0–1.9)
BILIRUB SERPL-MCNC: 0.3 MG/DL (ref 0.1–1)
BUN SERPL-MCNC: 20 MG/DL (ref 8–23)
CALCIUM SERPL-MCNC: 8.4 MG/DL (ref 8.7–10.5)
CHLORIDE SERPL-SCNC: 112 MMOL/L (ref 95–110)
CO2 SERPL-SCNC: 17 MMOL/L (ref 23–29)
CREAT SERPL-MCNC: 1 MG/DL (ref 0.5–1.4)
DIFFERENTIAL METHOD BLD: ABNORMAL
EOSINOPHIL # BLD AUTO: 0.2 K/UL (ref 0–0.5)
EOSINOPHIL NFR BLD: 2.4 % (ref 0–8)
ERYTHROCYTE [DISTWIDTH] IN BLOOD BY AUTOMATED COUNT: 15.6 % (ref 11.5–14.5)
EST. GFR  (NO RACE VARIABLE): >60 ML/MIN/1.73 M^2
GLUCOSE SERPL-MCNC: 88 MG/DL (ref 70–110)
HCT VFR BLD AUTO: 32.1 % (ref 40–54)
HGB BLD-MCNC: 10.3 G/DL (ref 14–18)
IMM GRANULOCYTES # BLD AUTO: 0.13 K/UL (ref 0–0.04)
IMM GRANULOCYTES NFR BLD AUTO: 1.5 % (ref 0–0.5)
LYMPHOCYTES # BLD AUTO: 1.3 K/UL (ref 1–4.8)
LYMPHOCYTES NFR BLD: 14.3 % (ref 18–48)
MAGNESIUM SERPL-MCNC: 1.9 MG/DL (ref 1.6–2.6)
MCH RBC QN AUTO: 27.3 PG (ref 27–31)
MCHC RBC AUTO-ENTMCNC: 32.1 G/DL (ref 32–36)
MCV RBC AUTO: 85 FL (ref 82–98)
MONOCYTES # BLD AUTO: 0.7 K/UL (ref 0.3–1)
MONOCYTES NFR BLD: 7.4 % (ref 4–15)
NEUTROPHILS # BLD AUTO: 6.5 K/UL (ref 1.8–7.7)
NEUTROPHILS NFR BLD: 74.2 % (ref 38–73)
NRBC BLD-RTO: 0 /100 WBC
PHOSPHATE SERPL-MCNC: 2.9 MG/DL (ref 2.7–4.5)
PLATELET # BLD AUTO: 187 K/UL (ref 150–450)
PMV BLD AUTO: 11 FL (ref 9.2–12.9)
POCT GLUCOSE: 125 MG/DL (ref 70–110)
POCT GLUCOSE: 85 MG/DL (ref 70–110)
POCT GLUCOSE: 98 MG/DL (ref 70–110)
POCT GLUCOSE: 98 MG/DL (ref 70–110)
POTASSIUM SERPL-SCNC: 3.1 MMOL/L (ref 3.5–5.1)
PROT SERPL-MCNC: 5.3 G/DL (ref 6–8.4)
RBC # BLD AUTO: 3.77 M/UL (ref 4.6–6.2)
SODIUM SERPL-SCNC: 142 MMOL/L (ref 136–145)
WBC # BLD AUTO: 8.75 K/UL (ref 3.9–12.7)

## 2024-11-26 PROCEDURE — 84100 ASSAY OF PHOSPHORUS: CPT

## 2024-11-26 PROCEDURE — 63600175 PHARM REV CODE 636 W HCPCS

## 2024-11-26 PROCEDURE — 36415 COLL VENOUS BLD VENIPUNCTURE: CPT

## 2024-11-26 PROCEDURE — 94761 N-INVAS EAR/PLS OXIMETRY MLT: CPT

## 2024-11-26 PROCEDURE — 63600175 PHARM REV CODE 636 W HCPCS: Mod: JZ,JG

## 2024-11-26 PROCEDURE — 94799 UNLISTED PULMONARY SVC/PX: CPT | Mod: XB

## 2024-11-26 PROCEDURE — 25000003 PHARM REV CODE 250

## 2024-11-26 PROCEDURE — 63600175 PHARM REV CODE 636 W HCPCS: Performed by: STUDENT IN AN ORGANIZED HEALTH CARE EDUCATION/TRAINING PROGRAM

## 2024-11-26 PROCEDURE — 80053 COMPREHEN METABOLIC PANEL: CPT

## 2024-11-26 PROCEDURE — 83735 ASSAY OF MAGNESIUM: CPT

## 2024-11-26 PROCEDURE — 99999 PR PBB SHADOW E&M-EST. PATIENT-LVL I: CPT | Mod: PBBFAC,,,

## 2024-11-26 PROCEDURE — 25500020 PHARM REV CODE 255: Performed by: STUDENT IN AN ORGANIZED HEALTH CARE EDUCATION/TRAINING PROGRAM

## 2024-11-26 PROCEDURE — 99900035 HC TECH TIME PER 15 MIN (STAT)

## 2024-11-26 PROCEDURE — 85025 COMPLETE CBC W/AUTO DIFF WBC: CPT

## 2024-11-26 PROCEDURE — 97530 THERAPEUTIC ACTIVITIES: CPT | Mod: CO

## 2024-11-26 PROCEDURE — 21400001 HC TELEMETRY ROOM

## 2024-11-26 RX ORDER — METRONIDAZOLE 500 MG/1
500 TABLET ORAL EVERY 8 HOURS
Status: DISCONTINUED | OUTPATIENT
Start: 2024-11-26 | End: 2024-11-28 | Stop reason: HOSPADM

## 2024-11-26 RX ORDER — ACETAMINOPHEN 500 MG
1000 TABLET ORAL EVERY 8 HOURS
Status: DISCONTINUED | OUTPATIENT
Start: 2024-11-26 | End: 2024-11-27

## 2024-11-26 RX ORDER — FLUCONAZOLE 2 MG/ML
400 INJECTION, SOLUTION INTRAVENOUS
Status: DISCONTINUED | OUTPATIENT
Start: 2024-11-26 | End: 2024-11-26

## 2024-11-26 RX ORDER — OXYCODONE HYDROCHLORIDE 5 MG/1
5 TABLET ORAL EVERY 4 HOURS PRN
Status: DISCONTINUED | OUTPATIENT
Start: 2024-11-26 | End: 2024-11-26

## 2024-11-26 RX ORDER — OXYCODONE HYDROCHLORIDE 5 MG/1
5 TABLET ORAL EVERY 4 HOURS PRN
Status: DISCONTINUED | OUTPATIENT
Start: 2024-11-26 | End: 2024-11-27

## 2024-11-26 RX ORDER — POTASSIUM CHLORIDE 7.45 MG/ML
10 INJECTION INTRAVENOUS
Status: COMPLETED | OUTPATIENT
Start: 2024-11-26 | End: 2024-11-26

## 2024-11-26 RX ORDER — CLARITHROMYCIN 500 MG/1
500 TABLET, FILM COATED ORAL EVERY 12 HOURS
Status: DISCONTINUED | OUTPATIENT
Start: 2024-11-26 | End: 2024-11-28 | Stop reason: HOSPADM

## 2024-11-26 RX ORDER — OXYCODONE HYDROCHLORIDE 5 MG/1
10 TABLET ORAL EVERY 4 HOURS PRN
Status: DISCONTINUED | OUTPATIENT
Start: 2024-11-26 | End: 2024-11-26

## 2024-11-26 RX ORDER — PANTOPRAZOLE SODIUM 40 MG/1
40 TABLET, DELAYED RELEASE ORAL 2 TIMES DAILY
Status: DISCONTINUED | OUTPATIENT
Start: 2024-11-26 | End: 2024-11-28 | Stop reason: HOSPADM

## 2024-11-26 RX ADMIN — PANTOPRAZOLE SODIUM 40 MG: 40 INJECTION, POWDER, FOR SOLUTION INTRAVENOUS at 08:11

## 2024-11-26 RX ADMIN — PANTOPRAZOLE SODIUM 40 MG: 40 TABLET, DELAYED RELEASE ORAL at 09:11

## 2024-11-26 RX ADMIN — METRONIDAZOLE 500 MG: 500 TABLET ORAL at 02:11

## 2024-11-26 RX ADMIN — ACETAMINOPHEN 1000 MG: 10 INJECTION INTRAVENOUS at 06:11

## 2024-11-26 RX ADMIN — CLARITHROMYCIN 500 MG: 500 TABLET, FILM COATED ORAL at 09:11

## 2024-11-26 RX ADMIN — ONDANSETRON 4 MG: 2 INJECTION INTRAMUSCULAR; INTRAVENOUS at 10:11

## 2024-11-26 RX ADMIN — CEFEPIME 2 G: 1 INJECTION, POWDER, FOR SOLUTION INTRAMUSCULAR; INTRAVENOUS at 05:11

## 2024-11-26 RX ADMIN — METRONIDAZOLE 500 MG: 500 TABLET ORAL at 09:11

## 2024-11-26 RX ADMIN — SODIUM CHLORIDE, POTASSIUM CHLORIDE, SODIUM LACTATE AND CALCIUM CHLORIDE: 600; 310; 30; 20 INJECTION, SOLUTION INTRAVENOUS at 06:11

## 2024-11-26 RX ADMIN — INSULIN GLARGINE 7 UNITS: 100 INJECTION, SOLUTION SUBCUTANEOUS at 08:11

## 2024-11-26 RX ADMIN — METRONIDAZOLE 500 MG: 500 INJECTION, SOLUTION INTRAVENOUS at 05:11

## 2024-11-26 RX ADMIN — POTASSIUM CHLORIDE 10 MEQ: 7.46 INJECTION, SOLUTION INTRAVENOUS at 10:11

## 2024-11-26 RX ADMIN — CEFEPIME 2 G: 1 INJECTION, POWDER, FOR SOLUTION INTRAMUSCULAR; INTRAVENOUS at 09:11

## 2024-11-26 RX ADMIN — FLUCONAZOLE 400 MG: 2 INJECTION, SOLUTION INTRAVENOUS at 03:11

## 2024-11-26 RX ADMIN — ACETAMINOPHEN 1000 MG: 500 TABLET ORAL at 02:11

## 2024-11-26 RX ADMIN — POTASSIUM CHLORIDE 10 MEQ: 7.46 INJECTION, SOLUTION INTRAVENOUS at 08:11

## 2024-11-26 RX ADMIN — POTASSIUM CHLORIDE 10 MEQ: 7.46 INJECTION, SOLUTION INTRAVENOUS at 06:11

## 2024-11-26 RX ADMIN — CEFEPIME 2 G: 1 INJECTION, POWDER, FOR SOLUTION INTRAMUSCULAR; INTRAVENOUS at 12:11

## 2024-11-26 RX ADMIN — MUPIROCIN: 20 OINTMENT TOPICAL at 09:11

## 2024-11-26 RX ADMIN — MUPIROCIN: 20 OINTMENT TOPICAL at 08:11

## 2024-11-26 RX ADMIN — IOHEXOL 100 ML: 350 INJECTION, SOLUTION INTRAVENOUS at 11:11

## 2024-11-26 RX ADMIN — POTASSIUM CHLORIDE 10 MEQ: 7.46 INJECTION, SOLUTION INTRAVENOUS at 09:11

## 2024-11-26 RX ADMIN — ENOXAPARIN SODIUM 40 MG: 40 INJECTION SUBCUTANEOUS at 05:11

## 2024-11-26 NOTE — PROGRESS NOTES
Smoking cessation education follow-up: Pt again denies nicotine withdrawal symptoms. Initial Ambulatory Smoking Cessation clinic appointment scheduled on 12/9/24 at 0900, [facility], pending hospital discharge.

## 2024-11-26 NOTE — PROGRESS NOTES
[facility]  General Surgery  Progress Note    Subjective:     History of Present Illness:  64M here with multiple days of abdominal pain. It has been much worse today. He has nausea and vomiting. Work-up revealed CT scan with free air.. He is also in DKA with surgar >500 initially. He has been started on an insulin ggt, given IV abx, and an IV PPI. He has never had abodminal surgery. He smokes 1/2 pack a day. He take naproxen but not every day. No evelyn or stroke. Walks around without issue. Independent.  Surgery consulted for free.     CT read: Findings compatible with perforated viscus with mild to moderate intraperitoneal free air and small amount of fluid more so in the upper abdomen and on the left. There is a round air-filled structure possibly representing a duodenal diverticulum. The possibility of peptic ulcer involving the adjacent gastric antrum is considered but it looks somewhat atypical for this diagnosis.     No white count but with left shift. . Elevated lactate. Elevated trops. LOW He is tachycardic to 129 but normal BP.     Post-Op Info:  Procedure(s) (LRB):  LAPAROTOMY, EXPLORATORY (N/A)  CLOSURE, ULCER, PERFORATED, DUODENUM, USING OMENTAL PATCH   3 Days Post-Op     Interval History: NAEON. Continues to admit thirst, LR increased yesterday to 125. Denies nausea or emesis. Pain is well controlled. Overall feeling good.    Medications:  Continuous Infusions:   D5 and 0.45% NaCl   Intravenous Continuous PRN   Stopped at 11/23/24 0933    lactated ringers   Intravenous Continuous 125 mL/hr at 11/26/24 0648 Rate Verify at 11/26/24 0648     Scheduled Meds:   acetaminophen  1,000 mg Intravenous Q8H    ceFEPime IV (PEDS and ADULTS)  2 g Intravenous Q8H    enoxparin  40 mg Subcutaneous Q24H (prophylaxis, 1700)    fluconazole (DIFLUCAN) IV (PEDS and ADULTS)  400 mg Intravenous Q24H    insulin glargine U-100  7 Units Subcutaneous Daily    metroNIDAZOLE IV (PEDS and ADULTS)  500 mg Intravenous Q8H     mupirocin   Nasal BID    pantoprazole  40 mg Intravenous BID    potassium chloride  10 mEq Intravenous Q1H     PRN Meds:  Current Facility-Administered Medications:     [COMPLETED] calcium gluconate IVPB, 1 g, Intravenous, Once **AND** calcium gluconate IVPB, 1 g, Intravenous, Q10 Min PRN    dextrose 10%, 12.5 g, Intravenous, PRN    dextrose 10%, 25 g, Intravenous, PRN    D5 and 0.45% NaCl, , Intravenous, Continuous PRN    glucagon (human recombinant), 1 mg, Intramuscular, PRN    HYDROmorphone, 0.5 mg, Intravenous, Q3H PRN    insulin aspart U-100, 0-5 Units, Subcutaneous, Q6H PRN    labetalol, 10 mg, Intravenous, Q6H PRN    ondansetron, 4 mg, Intravenous, Q6H PRN    sodium chloride 0.9%, 10 mL, Intravenous, PRN     Review of patient's allergies indicates:   Allergen Reactions    Penicillins Rash     Objective:     Vital Signs (Most Recent):  Temp: 97.9 °F (36.6 °C) (11/26/24 0832)  Pulse: 86 (11/26/24 0832)  Resp: 18 (11/26/24 0832)  BP: 139/66 (11/26/24 0832)  SpO2: 96 % (11/26/24 0832) Vital Signs (24h Range):  Temp:  [97.9 °F (36.6 °C)-98.8 °F (37.1 °C)] 97.9 °F (36.6 °C)  Pulse:  [86-98] 86  Resp:  [17-18] 18  SpO2:  [96 %-98 %] 96 %  BP: (139-157)/(66-81) 139/66     Weight: 103 kg (227 lb 1.2 oz)  Body mass index is 28.38 kg/m².    Intake/Output - Last 3 Shifts         11/24 0700 11/25 0659 11/25 0700 11/26 0659 11/26 0700 11/27 0659    P.O.  0     I.V. (mL/kg) 1481.5 (14.4) 910.2 (8.8)     IV Piggyback 1291.1 596.2     Total Intake(mL/kg) 2772.6 (26.9) 1506.5 (14.6)     Urine (mL/kg/hr) 1450 (0.6) 1100 (0.4)     Drains 360 450 150    Total Output 1810 1550 150    Net +962.6 -43.6 -150                    Physical Exam  HENT:      Nose:      Comments: NGT in place  Eyes:      Extraocular Movements: Extraocular movements intact.   Cardiovascular:      Rate and Rhythm: Normal rate.   Pulmonary:      Effort: Pulmonary effort is normal.   Abdominal:      General: Abdomen is flat.      Palpations: Abdomen is soft.       Comments: Incisions with dressing in place, staples in place  RUQ drain benign with SS output    Skin:     General: Skin is warm and dry.      Capillary Refill: Capillary refill takes less than 2 seconds.   Neurological:      General: No focal deficit present.      Mental Status: He is alert.   Psychiatric:         Mood and Affect: Mood normal.         Behavior: Behavior normal.          Significant Labs:  Labs reviewed.    Significant Diagnostics:  No new imaging to review.  Assessment/Plan:     * Free intraperitoneal air  64M here with abdominal pain found to be in DKA and has CT scan with free air. Exam consistent with peritonitis. He is now s/p ex-lap with findings of gastric perforation, aashish patch repair performed on 11/23. Doing well, NGT with non-bilious output. UGI to be performed today.     NPO  NGT  mIVF  IV abx, end date POD 4  BID PPI  H. Pylori ab, positive   bismuth subcitrate 420 mg QID   metronidazole 375 mg QID   tetracycline 375 mg QID   PPI   Blood cultures with contaminant, no repeat  Trops down trend and echo unremarkable  LOW improving and replace K today, Trend and repeat labs.   Continue strict glucose control for post-op ulcer healing and wound healing   DVT ppx     Dispo: 11/27 vs 11/28 pending UGI and diet toleration, will need home health based on PT/OT recs      Richie Stover MD  General Surgery  [facility]

## 2024-11-26 NOTE — CARE UPDATE
11/25/24 1957   PRE-TX-O2   Device (Oxygen Therapy) room air   SpO2 98 %   $ Pulse Oximetry - Multiple Charge Pulse Oximetry - Multiple   Incentive Spirometer   $ Incentive Spirometer Charges done with encouragement;proper technique demonstrated   Administration (IS) mouthpiece utilized;proper technique demonstrated   Number of Repetitions (IS) 5   Level Incentive Spirometer (mL) 1800   Patient Tolerance (IS) good;no adverse signs/symptoms present   Respiratory Evaluation   $ Care Plan Tech Time 15 min        no fever and no chills.

## 2024-11-26 NOTE — PLAN OF CARE
Problem: Adult Inpatient Plan of Care  Goal: Patient-Specific Goal (Individualized)  Outcome: Progressing     Problem: Wound  Goal: Optimal Pain Control and Function  Outcome: Progressing     Problem: Skin Injury Risk Increased  Goal: Skin Health and Integrity  Outcome: Progressing     Problem: Fall Injury Risk  Goal: Absence of Fall and Fall-Related Injury  Outcome: Progressing     Problem: Infection  Goal: Absence of Infection Signs and Symptoms  Outcome: Progressing     AAOx4. Medications administered per MAR. Tele monitored. Glucose monitored. ANALIA drain in place. NGT in place to LIWS. Safety precautions maintained. Call bell within reach.

## 2024-11-26 NOTE — ASSESSMENT & PLAN NOTE
64M here with abdominal pain found to be in DKA and has CT scan with free air. Exam consistent with peritonitis. He is now s/p ex-lap with findings of gastric perforation, aashish patch repair performed on 11/23. Doing well, NGT with non-bilious output. UGI to be performed today.     NPO  NGT  mIVF  IV abx, end date POD 4  BID PPI  H. Pylori ab, positive   bismuth subcitrate 420 mg QID   metronidazole 375 mg QID   tetracycline 375 mg QID   PPI   Blood cultures with contaminant, no repeat  Trops down trend and echo unremarkable  LOW improving and replace K today, Trend and repeat labs.   Continue strict glucose control for post-op ulcer healing and wound healing   DVT ppx     Dispo: 11/27 vs 11/28 pending UGI and diet toleration, will need home health based on PT/OT recs

## 2024-11-26 NOTE — PLAN OF CARE
Recommendations  1. Initiate TPN of Clinimix 4.25/5 @10ml/hr, advancing as tolerated to a goal rate of 100ml/hr with daily IVFE to provide 1316 kcals, 102g of protein, and 2400ml of fluid. GIR-0.8 mg/kg/min.     2. Advance diet as tolerated to clear liquids   3. Monitor weight and labs   4. Encourage PO intake as tolerated   5. Monitor need for additional nutrition support    Goals: Pt will meet 75% of EEN/EPN by RD follow up

## 2024-11-26 NOTE — PT/OT/SLP PROGRESS
Occupational Therapy   Treatment    Name: Al Anna  MRN: [id number]  Admitting Diagnosis:  Free intraperitoneal air  3 Days Post-Op    Recommendations:     Discharge Recommendations: Low Intensity Therapy  Discharge Equipment Recommendations:  other (see comments) (may benefit from walker)  Barriers to discharge:  Inaccessible home environment    Assessment:     Al Anna is a 64 y.o. male with a medical diagnosis of Free intraperitoneal air. Performance deficits affecting function are weakness, impaired endurance, impaired functional mobility, gait instability, impaired balance, decreased lower extremity function, decreased safety awareness, pain, decreased ROM, impaired coordination.     Rehab Prognosis:  Good; patient would benefit from acute skilled OT services to address these deficits and reach maximum level of function.       Plan:     Patient to be seen 3 x/week to address the above listed problems via self-care/home management, therapeutic activities, therapeutic exercises  Plan of Care Expires: 12/24/24  Plan of Care Reviewed with: patient, spouse    Subjective     Chief Complaint: Sleepy  Patient/Family Comments/goals: return to PLOF  Pain/Comfort:  Pain Rating 1: 0/10    Objective:     Communicated with: Emilia Leahy prior to session.  Patient found HOB elevated with bed alarm, ANALIA drain, NG tube, peripheral IV, telemetry upon OT entry to room.    General Precautions: Standard, fall    Orthopedic Precautions:N/A  Braces: N/A  Respiratory Status: Room air     Occupational Performance:     Bed Mobility:    Patient completed Rolling/Turning to Right with stand by assistance and with side rail  Patient completed Supine to Sit with stand by assistance and with side rail     Functional Mobility/Transfers:  Patient completed Sit <> Stand Transfer with minimum assistance  with  rolling walker   Patient completed Bed <> Chair Transfer using Step Transfer technique with minimum assistance with rolling  walker  Functional Mobility: Ambulated ~8 steps from bed to bedside chair using RW with Nelson; VC's for proper hand placement and transitional mvmts; educated on upright posture  Pt self-initiated ankle pumps and leg kicks before and during stance    Activities of Daily Living:  Unable this date 2/2 wife helping pt before session    [facility] 6 Click ADL: 18    Treatment & Education:  Educated pt and wife on purpose/role OT  ADL's as above; Wife enaged in helping pt, unable to grade  Mobility as above  Pt agreeable to t/f to chair this date, nsg notified    Patient left up in chair with all lines intact, call button in reach, chair alarm on, nsg notified, and wife present    GOALS:   Multidisciplinary Problems       Occupational Therapy Goals          Problem: Occupational Therapy    Goal Priority Disciplines Outcome Interventions   Occupational Therapy Goal     OT, PT/OT Progressing    Description: Goals to be met by: 12/24/24     Patient will increase functional independence with ADLs by performing:    UE Dressing with Modified [place].  LE Dressing with Modified [place] and Assistive Devices as needed.  Grooming while standing at sink with Modified [place].  Toileting from toilet with Modified [place] for hygiene and clothing management.   Toilet transfer to toilet with Modified [place].                         Time Tracking:     OT Date of Treatment: 11/26/24  OT Start Time: 1016  OT Stop Time: 1034  OT Total Time (min): 18 min    Billable Minutes:Therapeutic Activity 18    11/26/2024

## 2024-11-26 NOTE — PLAN OF CARE
SW sent HH referrals via [facility]. Cm will continue to follow pt through transitions of care and assist with any discharge needs.    Sherif Brewer, MSW  [phone]    Future Appointments   Date Time Provider Department Center   12/9/2024  9:00 AM Patricio Sheets [facility] [place]   12/17/2024  2:15 PM Smith Olmos MD [facility]        11/26/24 0855   Post-Acute Status   Post-Acute Authorization Home Health   Home Health Status Referrals Sent   Discharge Plan   Discharge Plan A Home Health

## 2024-11-26 NOTE — PROGRESS NOTES
[facility]  Adult Nutrition  Progress Note    SUMMARY     Recommendations  1. Initiate TPN of Clinimix 4.25/5 @10ml/hr, advancing as tolerated to a goal rate of 100ml/hr with daily IVFE to provide 1316 kcals, 102g of protein, and 2400ml of fluid. GIR-0.8 mg/kg/min.     2. Advance diet as tolerated to clear liquids   3. Monitor weight and labs   4. Encourage PO intake as tolerated   5. Monitor need for additional nutrition support    Goals: Pt will meet 75% of EEN/EPN by RD follow up    Nutrition Goal Status: new  Communication of RD Recs:  (POC)    Assessment and Plan  Nutrition Problem  Altered GI function     Related to (etiology):   Free intraperitoneal air     Signs and Symptoms (as evidenced by):   NPO status      Interventions/Recommendations (treatment strategy):  Collaboration by nutrition professional with other providers   Parenteral nutrition management- Clinimix 4.25/5 @100ml/hr     Nutrition Diagnosis Status:   Continues    Malnutrition Assessment  Weight Loss (Malnutrition):  (6% weight loss x 6 months)   Unable to assess, pt off unit at time of visit     Reason for Assessment  Reason For Assessment: RD follow-up  Diagnosis:  (Free intraperitoneal air)  General Information Comments: Pt is currently NPO. Juan-16/incision medial anterior upper quadrant. NG tube placed 11/23 for suction. S/p ex lap with closure of perforated duodenum. Pt presented with abdominal pain, N/V. CT scan with free air, RD consulted for TPN over the weekend, no TPN in place. Noted 20 lb weight loss in 10 months. Unable to conduct NFPE at this time, pt with radiology at time of visit.  Nutrition Discharge Planning: d/c on diabetic/cardiac diet    Nutrition Risk Screen  Nutrition Risk Screen: no indicators present    Nutrition/Diet History  Patient Reported Diet/Restrictions/Preferences: diabetic diet, heart healthy  Food Preferences: VANE  Spiritual, Cultural Beliefs, Anglican Practices, Values that Affect Care:  "no  Factors Affecting Nutritional Intake: abdominal pain, NPO, nausea/vomiting, altered gastrointestinal function    Food Insecurity: No Food Insecurity (2024)    Hunger Vital Sign     Worried About Running Out of Food in the Last Year: Never true     Ran Out of Food in the Last Year: Never true     Anthropometrics  Temp: 98 °F (36.7 °C)  Height Method: Stated  Height: 6' 3" (190.5 cm)  Height (inches): 75 in  Weight Method: Bed Scale  Weight: 103 kg (227 lb 1.2 oz)  Weight (lb): 227.08 lb  Ideal Body Weight (IBW), Male: 196 lb  % Ideal Body Weight, Male (lb): 115.86 %  BMI (Calculated): 28.4  BMI Grade: 25 - 29.9 - overweight  Usual Body Weight (UBW), k.8 kg (24)  Weight Change Amount: 14 lb 15.9 oz  % Usual Body Weight: 94  % Weight Change From Usual Weight: -6.19 %     Lab/Procedures/Meds  Pertinent Labs Reviewed: reviewed  Pertinent Labs Comments: K 3.1, , CO2 17, Ca 8.4, GFR 43  Pertinent Medications Reviewed: reviewed  Pertinent Medications Comments: enoxaparin, fluconazole, insulin, pantoprazole    Estimated/Assessed Needs  Weight Used For Calorie Calculations: 103 kg (227 lb 1.2 oz)  Energy Calorie Requirements (kcal): 2286 kcals (PAL 1.2)  Energy Need Method: [place]- [place]  Protein Requirements: 123g (1.2g/kg)  Weight Used For Protein Calculations: 103 kg (227 lb 1.2 oz)     Estimated Fluid Requirement Method: RDA Method  RDA Method (mL): 2286  CHO Requirement: 285 kcals    Nutrition Prescription Ordered  Current Diet Order: NPO    Evaluation of Received Nutrient/Fluid Intake  I/O: 0/150  Energy Calories Required: not meeting needs  Protein Required: not meeting needs  Fluid Required: not meeting needs  Comments: LBM-24  Tolerance: not tolerating  % Intake of Estimated Energy Needs: Other: NPO   % Meal Intake: NPO    Nutrition Risk  Level of Risk/Frequency of Follow-up: high (2x/week)     Monitor and Evaluation  Food and Nutrient Intake: parenteral nutrition intake  Food and " Nutrient Adminstration: enteral and parenteral nutrition administration  Physical Activity and Function: nutrition-related ADLs and IADLs  Anthropometric Measurements: weight  Biochemical Data, Medical Tests and Procedures: electrolyte and renal panel, gastrointestinal profile, glucose/endocrine profile, inflammatory profile  Nutrition-Focused Physical Findings: overall appearance     Nutrition Follow-Up  RD Follow-up?: Yes

## 2024-11-26 NOTE — PROGRESS NOTES
[facility] Medicine Progress Note       Primary Attending Physician: Dr. Carrion  Primary Team: General Surgery  Consultant Attending: Dr. Arellano  Consultant Resident: Skylar Aguilar     Reason for Consult:      Glucose control, LOW, and other comorbidities    Subjective:      Vitals remained stable overnight, no acute events reported. Patient reports feeling well this morning, no complaints at this time.     Objective:     Last 24 Hour Vital Signs:  BP  Min: 139/66  Max: 157/81  Temp  Av.5 °F (36.9 °C)  Min: 97.9 °F (36.6 °C)  Max: 98.8 °F (37.1 °C)  Pulse  Av.7  Min: 86  Max: 98  Resp  Av.8  Min: 17  Max: 18  SpO2  Av.3 %  Min: 96 %  Max: 98 %  I/O last 3 completed shifts:  In: 2769.2 [I.V.:1660.2; IV Piggyback:1109]  Out: 2520 [Urine:1900; Drains:620]    Physical Examination:  Constitutional: Awake, alert, no acute distress  Neurological: Alert and oriented, no focal deficits  Head/Neck: Full range of motion, no tenderness or stiffness, NG tube to suction in place left nare  Cardiovascular: Regular rate and rhythm, no murmur  Pulmonary: Lungs clear, equal breath sounds  Abdominal: Soft, non tender, normal bowel sounds, surgical scars healing well  Musculoskeletal: Full range of motion, no edema    Laboratory:  Laboratory Data Reviewed:   Pertinent Findings:  Reviewed, stable    Microbiology Data Reviewed:   Pertinent Findings:  Blood cx 1/2- coag negative staph, likely contaminant    Other Results:  EKG (my interpretation):   Sinus tachycardia, rate 138. No ST elevations. Possible Left atrial enlargement     Echo :    Left Ventricle: The left ventricle is normal in size. Mildly increased wall thickness. There is concentric remodeling. Unable to assess wall motion. Endocardium not well visualized however systolic function appears preserved.  Consider repeat imaging with echo enhancing agent if indicated Diastolic function cannot be reliably determined in the presence of mitral annular  calcification.    Right Ventricle: Normal right ventricular cavity size. Systolic function is normal.    Aortic Valve: There is mild stenosis. Aortic valve peak velocity is 2.2 m/s. Mean gradient is 10.5 mmHg. The dimensionless index is 0.60.    Mitral Valve: There is mild regurgitation.    Radiology Data Reviewed:   Pertinent Findings:  Upper GI pending    Current Medications:     Infusions:   D5 and 0.45% NaCl   Intravenous Continuous PRN   Stopped at 11/23/24 0933    lactated ringers   Intravenous Continuous 125 mL/hr at 11/26/24 0648 Rate Verify at 11/26/24 0648        Scheduled:   acetaminophen  1,000 mg Intravenous Q8H    ceFEPime IV (PEDS and ADULTS)  2 g Intravenous Q8H    enoxparin  40 mg Subcutaneous Q24H (prophylaxis, 1700)    fluconazole (DIFLUCAN) IV (PEDS and ADULTS)  400 mg Intravenous Q24H    insulin glargine U-100  7 Units Subcutaneous Daily    metroNIDAZOLE IV (PEDS and ADULTS)  500 mg Intravenous Q8H    mupirocin   Nasal BID    pantoprazole  40 mg Intravenous BID    potassium chloride  10 mEq Intravenous Q1H        PRN:    Current Facility-Administered Medications:     [COMPLETED] calcium gluconate IVPB, 1 g, Intravenous, Once **AND** calcium gluconate IVPB, 1 g, Intravenous, Q10 Min PRN    dextrose 10%, 12.5 g, Intravenous, PRN    dextrose 10%, 25 g, Intravenous, PRN    D5 and 0.45% NaCl, , Intravenous, Continuous PRN    glucagon (human recombinant), 1 mg, Intramuscular, PRN    HYDROmorphone, 0.5 mg, Intravenous, Q3H PRN    insulin aspart U-100, 0-5 Units, Subcutaneous, Q6H PRN    labetalol, 10 mg, Intravenous, Q6H PRN    ondansetron, 4 mg, Intravenous, Q6H PRN    sodium chloride 0.9%, 10 mL, Intravenous, PRN    Assessment/Plan:     Al Anna is a 64 y.o. male with history of poorly-controlled T2DM c/b neuropathy and retinopathy, CKD-IIIb, HTN, HLD, and PAD c/b STI of L foot s/p partial amputation who presented to [facility] on 11/22/2024 with a primary complaint of sudden  onset abdominal pain that started early the morning of presentation. He was found to be in DKA, with CT A/P highly concerning for bowel perforation. He had an ex lap with omental flap repair of 1.5cm perforated gastric ulcer, stepped down from ICU 11/24. Admitted with general surgery as primary and consulted to [facility] IM for management of DM2 and LOW.     Perforated gastric ulcer s/p omental patch repair  - continued management per primary (surgery)      Poorly-controlled Diabetes Mellitus, Type II, w/o Insulin Use c/b Neuropathy, Retinopathy, and Current Diabetic Ketoacidosis, resolved   Patient presented with serum glucose of 520, HCO3 of 13 with anion gap of 24, and beta hydroxybutyrate of 2.3 all consistent with DKA. He denies any previous hx of DKA. Although his T2DM is not well controlled, his current DKA was likely induced by his suspected bowel perforation. Current home regimen includes Metformin 1000 mg bid, Actos 45 mg daily, Dapagliflozin 10 mg daily, and Gabapentin 300 mg qhs.  - continue home Lantus 7 U + SSI for correction  - glucose checks q6 while NPO  - Can resume home Gabapentin once cleared for PO intake     Acute Kidney Injury on Chronic Kidney Disease, Stage III-b  Hyperkalemia  Patient has a hx of CKD with baseline Cr of 1.7-1.9 and GFR ~40 but presented with Cr of 2.5 and GFR of 28. Initial CrCl 45 mL/min. Etiology likely pre-renal.  Plan:  - FENa 0.2 indicating pre renal etiology, creatinine improving  - Renally dose meds as indicated  - Avoid nephrotoxins as able  - CMP with improving kidney function, continue daily     Elevated Troponin, resolved  Patient presented with initial troponin elevation at 0.046. Extremely low concern for ACS at this time as EKG is non-concerning and mild troponin elevation is most likely secondary to stress given significant abdominal pain.  Plan:  - peaked at 1.160, likely stress induced in the setting of bowel perforation  - continue to monitor for chest pain and  changes on tele     Peripheral Arterial Disease c/b Soft Tissue Infection of Left Foot s/p Partial Amputation and Arterial Stenting of Left Leg  Mixed Hyperlipidemia  Patient underwent trans-metatarsal amputation of left foot and arterial stenting of left leg in 02/2020 after developing an infection and followed along for an extended course of time with Dr. Horner due to poor wound healing with long-term abx therapy. He is currently prescribed Rosuvastatin 5 mg daily.  - Resume Rosuvastatin once cleared for PO intake  - Continue to  patient on importance of smoking cessation     Primary Hypertension  Current home regimen is Lisinopril-HCTZ 20-25 mg daily. BP stable upon admission.  Plan:  - Hold home Lisinopril-HCTZ for now as BP is normotensive  - Restart home meds at discharge     Current Tobacco Use  Patient states that he started smoking at age 17 with an average of 1 ppd until recently cutting down to 0.5 ppd. He has an approximate 45 pack year hx but has never received LDCT Chest for lung cancer screening.  Plan:  - Continue to  patient on importance of smoking cessation  - Needs LDCT Chest as it has never been performed and he is an active smoker with significant pack year hx. Discuss with PCP on discharge.      Lumbar Radiculopathy  Patient currently prescribed Gabapentin 300 mg qhs and Tramadol 50 mg TID prn.  Plan:  - Can resume home Gabapentin once cleared for PO intake    Ppx: lovenox  Dispo: per primary    Herlinda Troy MD  [facility] Internal Medicine/Pediatrics HO-2    [facility] Medicine Hospitalist Pager numbers:   [facility] Hospitalist Medicine Team A (Lorena/Hope): [phone]  [facility] Hospitalist Medicine Team B (Marvin/Radha):  [phone]

## 2024-11-26 NOTE — PT/OT/SLP PROGRESS
Physical Therapy  Visit Attempt    Patient Name:  Al Anna   MRN:  [id number]    Patient not seen today secondary to Other (Comment) (pt politely declined this date due to increased movement during testing and endorses participating in OT session this date). Pt agreeable to participate tomorrow's date. Will follow-up tomorrow's date.    11/26/2024

## 2024-11-26 NOTE — SUBJECTIVE & OBJECTIVE
Interval History: NAEON. Continues to admit thirst, LR increased yesterday to 125. Denies nausea or emesis. Pain is well controlled. Overall feeling good.    Medications:  Continuous Infusions:   D5 and 0.45% NaCl   Intravenous Continuous PRN   Stopped at 11/23/24 0933    lactated ringers   Intravenous Continuous 125 mL/hr at 11/26/24 0648 Rate Verify at 11/26/24 0648     Scheduled Meds:   acetaminophen  1,000 mg Intravenous Q8H    ceFEPime IV (PEDS and ADULTS)  2 g Intravenous Q8H    enoxparin  40 mg Subcutaneous Q24H (prophylaxis, 1700)    fluconazole (DIFLUCAN) IV (PEDS and ADULTS)  400 mg Intravenous Q24H    insulin glargine U-100  7 Units Subcutaneous Daily    metroNIDAZOLE IV (PEDS and ADULTS)  500 mg Intravenous Q8H    mupirocin   Nasal BID    pantoprazole  40 mg Intravenous BID    potassium chloride  10 mEq Intravenous Q1H     PRN Meds:  Current Facility-Administered Medications:     [COMPLETED] calcium gluconate IVPB, 1 g, Intravenous, Once **AND** calcium gluconate IVPB, 1 g, Intravenous, Q10 Min PRN    dextrose 10%, 12.5 g, Intravenous, PRN    dextrose 10%, 25 g, Intravenous, PRN    D5 and 0.45% NaCl, , Intravenous, Continuous PRN    glucagon (human recombinant), 1 mg, Intramuscular, PRN    HYDROmorphone, 0.5 mg, Intravenous, Q3H PRN    insulin aspart U-100, 0-5 Units, Subcutaneous, Q6H PRN    labetalol, 10 mg, Intravenous, Q6H PRN    ondansetron, 4 mg, Intravenous, Q6H PRN    sodium chloride 0.9%, 10 mL, Intravenous, PRN     Review of patient's allergies indicates:   Allergen Reactions    Penicillins Rash     Objective:     Vital Signs (Most Recent):  Temp: 97.9 °F (36.6 °C) (11/26/24 0832)  Pulse: 86 (11/26/24 0832)  Resp: 18 (11/26/24 0832)  BP: 139/66 (11/26/24 0832)  SpO2: 96 % (11/26/24 0832) Vital Signs (24h Range):  Temp:  [97.9 °F (36.6 °C)-98.8 °F (37.1 °C)] 97.9 °F (36.6 °C)  Pulse:  [86-98] 86  Resp:  [17-18] 18  SpO2:  [96 %-98 %] 96 %  BP: (139-157)/(66-81) 139/66     Weight: 103 kg (227 lb  1.2 oz)  Body mass index is 28.38 kg/m².    Intake/Output - Last 3 Shifts         11/24 0700 11/25 0659 11/25 0700 11/26 0659 11/26 0700 11/27 0659    P.O.  0     I.V. (mL/kg) 1481.5 (14.4) 910.2 (8.8)     IV Piggyback 1291.1 596.2     Total Intake(mL/kg) 2772.6 (26.9) 1506.5 (14.6)     Urine (mL/kg/hr) 1450 (0.6) 1100 (0.4)     Drains 360 450 150    Total Output 1810 1550 150    Net +962.6 -43.6 -150                    Physical Exam  HENT:      Nose:      Comments: NGT in place  Eyes:      Extraocular Movements: Extraocular movements intact.   Cardiovascular:      Rate and Rhythm: Normal rate.   Pulmonary:      Effort: Pulmonary effort is normal.   Abdominal:      General: Abdomen is flat.      Palpations: Abdomen is soft.      Comments: Incisions with dressing in place, staples in place  RUQ drain benign with SS output    Skin:     General: Skin is warm and dry.      Capillary Refill: Capillary refill takes less than 2 seconds.   Neurological:      General: No focal deficit present.      Mental Status: He is alert.   Psychiatric:         Mood and Affect: Mood normal.         Behavior: Behavior normal.          Significant Labs:  Labs reviewed.    Significant Diagnostics:  No new imaging to review.

## 2024-11-27 ENCOUNTER — CLINICAL SUPPORT (OUTPATIENT)
Dept: SMOKING CESSATION | Facility: CLINIC | Age: 64
End: 2024-11-27

## 2024-11-27 DIAGNOSIS — F17.210 CIGARETTE SMOKER: Primary | ICD-10-CM

## 2024-11-27 LAB
ALBUMIN SERPL BCP-MCNC: 1.9 G/DL (ref 3.5–5.2)
ALP SERPL-CCNC: 99 U/L (ref 40–150)
ALT SERPL W/O P-5'-P-CCNC: 10 U/L (ref 10–44)
ANION GAP SERPL CALC-SCNC: 10 MMOL/L (ref 8–16)
AST SERPL-CCNC: 21 U/L (ref 10–40)
BACTERIA SPEC ANAEROBE CULT: NORMAL
BASOPHILS # BLD AUTO: 0.04 K/UL (ref 0–0.2)
BASOPHILS NFR BLD: 0.4 % (ref 0–1.9)
BILIRUB SERPL-MCNC: 0.4 MG/DL (ref 0.1–1)
BUN SERPL-MCNC: 17 MG/DL (ref 8–23)
CALCIUM SERPL-MCNC: 8.5 MG/DL (ref 8.7–10.5)
CHLORIDE SERPL-SCNC: 109 MMOL/L (ref 95–110)
CO2 SERPL-SCNC: 20 MMOL/L (ref 23–29)
CREAT SERPL-MCNC: 0.9 MG/DL (ref 0.5–1.4)
DIFFERENTIAL METHOD BLD: ABNORMAL
EOSINOPHIL # BLD AUTO: 0.4 K/UL (ref 0–0.5)
EOSINOPHIL NFR BLD: 3.9 % (ref 0–8)
ERYTHROCYTE [DISTWIDTH] IN BLOOD BY AUTOMATED COUNT: 15.8 % (ref 11.5–14.5)
EST. GFR  (NO RACE VARIABLE): >60 ML/MIN/1.73 M^2
ESTIMATED AVG GLUCOSE: 226 MG/DL (ref 68–131)
FUNGUS SPEC CULT: ABNORMAL
GLUCOSE SERPL-MCNC: 85 MG/DL (ref 70–110)
HBA1C MFR BLD: 9.5 % (ref 4–5.6)
HCT VFR BLD AUTO: 34.3 % (ref 40–54)
HGB BLD-MCNC: 11.1 G/DL (ref 14–18)
IMM GRANULOCYTES # BLD AUTO: 0.46 K/UL (ref 0–0.04)
IMM GRANULOCYTES NFR BLD AUTO: 4.9 % (ref 0–0.5)
LYMPHOCYTES # BLD AUTO: 1.4 K/UL (ref 1–4.8)
LYMPHOCYTES NFR BLD: 14.6 % (ref 18–48)
MAGNESIUM SERPL-MCNC: 1.8 MG/DL (ref 1.6–2.6)
MCH RBC QN AUTO: 27.5 PG (ref 27–31)
MCHC RBC AUTO-ENTMCNC: 32.4 G/DL (ref 32–36)
MCV RBC AUTO: 85 FL (ref 82–98)
MONOCYTES # BLD AUTO: 1.1 K/UL (ref 0.3–1)
MONOCYTES NFR BLD: 11.3 % (ref 4–15)
NEUTROPHILS # BLD AUTO: 6.1 K/UL (ref 1.8–7.7)
NEUTROPHILS NFR BLD: 64.9 % (ref 38–73)
NRBC BLD-RTO: 0 /100 WBC
PHOSPHATE SERPL-MCNC: 2.2 MG/DL (ref 2.7–4.5)
PLATELET # BLD AUTO: 204 K/UL (ref 150–450)
PMV BLD AUTO: 10.6 FL (ref 9.2–12.9)
POCT GLUCOSE: 142 MG/DL (ref 70–110)
POCT GLUCOSE: 146 MG/DL (ref 70–110)
POCT GLUCOSE: 88 MG/DL (ref 70–110)
POTASSIUM SERPL-SCNC: 3.6 MMOL/L (ref 3.5–5.1)
PROT SERPL-MCNC: 5.4 G/DL (ref 6–8.4)
RBC # BLD AUTO: 4.03 M/UL (ref 4.6–6.2)
SODIUM SERPL-SCNC: 139 MMOL/L (ref 136–145)
WBC # BLD AUTO: 9.4 K/UL (ref 3.9–12.7)

## 2024-11-27 PROCEDURE — 25000003 PHARM REV CODE 250

## 2024-11-27 PROCEDURE — 63600175 PHARM REV CODE 636 W HCPCS: Performed by: STUDENT IN AN ORGANIZED HEALTH CARE EDUCATION/TRAINING PROGRAM

## 2024-11-27 PROCEDURE — 97530 THERAPEUTIC ACTIVITIES: CPT

## 2024-11-27 PROCEDURE — 99900035 HC TECH TIME PER 15 MIN (STAT)

## 2024-11-27 PROCEDURE — 36415 COLL VENOUS BLD VENIPUNCTURE: CPT

## 2024-11-27 PROCEDURE — 83735 ASSAY OF MAGNESIUM: CPT

## 2024-11-27 PROCEDURE — 85025 COMPLETE CBC W/AUTO DIFF WBC: CPT

## 2024-11-27 PROCEDURE — 83036 HEMOGLOBIN GLYCOSYLATED A1C: CPT

## 2024-11-27 PROCEDURE — 94799 UNLISTED PULMONARY SVC/PX: CPT | Mod: XB

## 2024-11-27 PROCEDURE — 84100 ASSAY OF PHOSPHORUS: CPT

## 2024-11-27 PROCEDURE — 94761 N-INVAS EAR/PLS OXIMETRY MLT: CPT

## 2024-11-27 PROCEDURE — 21400001 HC TELEMETRY ROOM

## 2024-11-27 PROCEDURE — 97110 THERAPEUTIC EXERCISES: CPT

## 2024-11-27 PROCEDURE — 97116 GAIT TRAINING THERAPY: CPT

## 2024-11-27 PROCEDURE — 80053 COMPREHEN METABOLIC PANEL: CPT

## 2024-11-27 RX ORDER — ACETAMINOPHEN 500 MG
1000 TABLET ORAL EVERY 8 HOURS PRN
Status: DISCONTINUED | OUTPATIENT
Start: 2024-11-27 | End: 2024-11-28

## 2024-11-27 RX ORDER — SODIUM,POTASSIUM PHOSPHATES 280-250MG
2 POWDER IN PACKET (EA) ORAL ONCE
Status: COMPLETED | OUTPATIENT
Start: 2024-11-27 | End: 2024-11-27

## 2024-11-27 RX ADMIN — POTASSIUM & SODIUM PHOSPHATES POWDER PACK 280-160-250 MG 2 PACKET: 280-160-250 PACK at 08:11

## 2024-11-27 RX ADMIN — METRONIDAZOLE 500 MG: 500 TABLET ORAL at 03:11

## 2024-11-27 RX ADMIN — INSULIN GLARGINE 7 UNITS: 100 INJECTION, SOLUTION SUBCUTANEOUS at 08:11

## 2024-11-27 RX ADMIN — CLARITHROMYCIN 500 MG: 500 TABLET, FILM COATED ORAL at 08:11

## 2024-11-27 RX ADMIN — ACETAMINOPHEN 1000 MG: 500 TABLET ORAL at 05:11

## 2024-11-27 RX ADMIN — MUPIROCIN: 20 OINTMENT TOPICAL at 09:11

## 2024-11-27 RX ADMIN — MUPIROCIN: 20 OINTMENT TOPICAL at 08:11

## 2024-11-27 RX ADMIN — METRONIDAZOLE 500 MG: 500 TABLET ORAL at 05:11

## 2024-11-27 RX ADMIN — ENOXAPARIN SODIUM 40 MG: 40 INJECTION SUBCUTANEOUS at 04:11

## 2024-11-27 RX ADMIN — PANTOPRAZOLE SODIUM 40 MG: 40 TABLET, DELAYED RELEASE ORAL at 08:11

## 2024-11-27 RX ADMIN — METRONIDAZOLE 500 MG: 500 TABLET ORAL at 09:11

## 2024-11-27 RX ADMIN — PANTOPRAZOLE SODIUM 40 MG: 40 TABLET, DELAYED RELEASE ORAL at 09:11

## 2024-11-27 RX ADMIN — CLARITHROMYCIN 500 MG: 500 TABLET, FILM COATED ORAL at 09:11

## 2024-11-27 NOTE — PLAN OF CARE
PITA attempted to call Ruth with [facility] Phone: [phone] there was no answer. PITA was unable to leave Ruth a VM. Cm will continue to follow pt through transitions of care and assist with any discharge needs.    LANG Maloney  [phone]    Future Appointments   Date Time Provider Department Center   12/9/2024  9:00 AM Patricio Sheets [facility]   12/17/2024  2:15 PM Smith Olmos MD [facility]        11/27/24 1311   Post-Acute Status   Post-Acute Authorization Home Health   Home Health Status Referrals Sent   Discharge Plan   Discharge Plan A Home Health

## 2024-11-27 NOTE — ASSESSMENT & PLAN NOTE
64M here with abdominal pain found to be in DKA and gastric perforation, ex-lap with findings of gastric perforation, aashish patch repair performed on 11/23. H. Pylori positive. POD 3 UGI without evidence of leak. Doing well.    CLD  BID PPI  H. Pylori ab, positive   Clarithromycin   Metronidazole (penicillin allergy)   PPI   Replace phos  Continue strict glucose control for post-op ulcer healing and wound healing   DVT ppx     Dispo: 11/27 vs 11/28 pending toleration of diet and HH set up

## 2024-11-27 NOTE — PT/OT/SLP PROGRESS
Physical Therapy Treatment    Patient Name:  Al Anna   MRN:  [id number]    Recommendations:     Discharge Recommendations: Low Intensity Therapy (HH PT/OT)  Discharge Equipment Recommendations: walker, rolling  Barriers to discharge:  impaired balance    Assessment:     Al Anna is a 64 y.o. male admitted with a medical diagnosis of Free intraperitoneal air.  He presents with the following impairments/functional limitations: weakness, impaired balance, decreased safety awareness, impaired skin, pain, impaired functional mobility, gait instability, decreased lower extremity function, decreased upper extremity function, impaired self care skills, impaired endurance, decreased ROM, orthopedic precautions, impaired sensation, decreased coordination . Pt is progressing well towards goals. Pt did have 1 LOB posteriorly when turning to sit onto bed with RW and required ModA to control descent. Pt is unsteady at times and REQUIRES use of a RW to maintain stability.    The mobility limitation cannot be sufficiently resolved by the use of a cane.   Patient's functional mobility deficit can be sufficiently resolved with the use of a rolling walker. Patient's mobility limitation significantly impairs their ability to participate in one of more activities of daily living. The use of a rolling walker will significantly improve the patient's ability to participate in MRADLS and the patient will use it on regular basis in the home.       Rehab Prognosis: Good; patient would benefit from acute skilled PT services to address these deficits and reach maximum level of function.    Recent Surgery: Procedure(s) (LRB):  LAPAROTOMY, EXPLORATORY (N/A)  CLOSURE, ULCER, PERFORATED, DUODENUM, USING OMENTAL PATCH 4 Days Post-Op    Plan:     During this hospitalization, patient to be seen 3 x/week to address the identified rehab impairments via gait training, therapeutic activities, therapeutic exercises, neuromuscular re-education and  "progress toward the following goals:    Plan of Care Expires:  12/22/24    Subjective     Chief Complaint: discomfort near incisions   Patient/Family Comments/goals: pt agreeable to therapy with increased encouragement   Pain/Comfort:  Pain Rating 1:  (discomfort near incisions, did not rate)  Location - Orientation 1: generalized  Location 1: abdomen  Pain Addressed 1: Reposition, Distraction, Cessation of Activity, Nurse notified  Pain Rating Post-Intervention 1:  (reports discomfort but did not rate pain)      Objective:     Communicated with nsg prior to session.  Patient found HOB elevated with bed alarm, ANALIA drain, peripheral IV, telemetry upon PT entry to room.     General Precautions: Standard, fall  Orthopedic Precautions: Full weight bearing (per Dr. Horner)  Braces: N/A  Respiratory Status: Room air     Functional Mobility:  Bed Mobility:     Scooting: stand by assistance  Supine to Sit: stand by assistance  Sit to Supine: minimum assistance for leg lift  Transfers:     Sit to Stand:  contact guard assistance and minimum assistance with rolling walker  Bed to Chair: contact guard assistance with  no AD  using  Stand Pivot and Squat Pivot X 2 trials  Gait: pt ambulated ~25 ft x 2 with RW and CGA-SBA; increased B knee flexion and toe out noted, VC's for upright posture and safe use of RW for approaching surfaces due to LOB with turn towards bed  Stairs:  Pt ascended/descended 4" curb step and 6" curb step X 3 and X 2, respectively, with No Assistive Device with bilateral handrails with Contact Guard Assistance.       AM-PAC 6 CLICK MOBILITY  Turning over in bed (including adjusting bedclothes, sheets and blankets)?: 4  Sitting down on and standing up from a chair with arms (e.g., wheelchair, bedside commode, etc.): 3  Moving from lying on back to sitting on the side of the bed?: 3  Moving to and from a bed to a chair (including a wheelchair)?: 3  Need to walk in hospital room?: 3  Climbing 3-5 steps with a " railing?: 3  Basic Mobility Total Score: 19       Treatment & Education:  Pt agreeable to therapy  Pt fitted for B darco shoes due to L foot wound, Dr. Horner reports pt is full weight-bearing per secure chat  Seated therex 15-20 reps BLE LAQs and ankle pumps  Pt ambulated gait trials as above  Pt agreeable to practice stair training on 4th floor with wooden staircase   Pt transferred to transport chair via stand/squat pivot and VC's for hand placement for transfer  Pt able to perform stairs as above  Pt transferred chair to bed and returned supine  Provided and educated caregiver on use of gait belt for safe entry into home and for transfers.      Patient left HOB elevated with all lines intact, call button in reach, bed alarm on, and nsg notified..    GOALS:   Multidisciplinary Problems       Physical Therapy Goals          Problem: Physical Therapy    Goal Priority Disciplines Outcome Interventions   Physical Therapy Goal     PT, PT/OT Progressing    Description: Goals to be met by: 2024     Patient will increase functional independence with mobility by performin. Supine to sit with Modified [place]  2. Sit to supine with Modified [place]  3. Sit to stand transfer with Modified [place]  4. Gait  x 150 feet with Modified [place] using LRAD.   5. Stand for 15 minutes with Modified [place] using LRAD                   Multidisciplinary Problems (Resolved)          Problem: Physical Therapy    Goal Priority Disciplines Outcome Interventions   Physical Therapy Goal   (Resolved)     PT, PT/OT Met           Problem: Physical Therapy    Goal Priority Disciplines Outcome Interventions   Physical Therapy Goal   (Resolved)     PT, PT/OT Met    Description: Pt with no acute care PT needs                       Time Tracking:     PT Received On: 24  PT Start Time: 1017     PT Stop Time: 1103  PT Total Time (min): 46 min     Billable Minutes: Gait Training 23, Therapeutic Activity  15, and Therapeutic Exercise 8    Treatment Type: Treatment  PT/PTA: PT     Number of PTA visits since last PT visit: 0     11/27/2024

## 2024-11-27 NOTE — PROGRESS NOTES
Individual Follow-Up Form    11/27/2024    Quit Date: To be determined    Clinical Status of Patient: Inpatient    Length of Service: 30 minutes    Comments: Smoking cessation education follow-up: Pt again denies nicotine withdrawal symptoms. Initial Ambultory Smoking Cessation clinic appointment scheduled for 12/9/24 at 0900, [facility].,     Diagnosis: F17.210

## 2024-11-27 NOTE — PROGRESS NOTES
[facility] Medicine Progress Note       Primary Attending Physician: Dr. Carrion  Primary Team: General Surgery  Consultant Attending: Dr. Arellano  Consultant Resident: Skylar Aguilar     Reason for Consult:      Glucose control, LOW, and other comorbidities    Subjective:      Vitals remained stable overnight, no acute events reported. Patient reports feeling well this morning, no complaints at this time. Diet was started yesterday with clear liquids, he is tolerating well.      Objective:     Last 24 Hour Vital Signs:  BP  Min: 133/70  Max: 173/82  Temp  Av.8 °F (37.1 °C)  Min: 97.9 °F (36.6 °C)  Max: 100.2 °F (37.9 °C)  Pulse  Av.4  Min: 88  Max: 100  Resp  Av  Min: 16  Max: 20  SpO2  Av.8 %  Min: 96 %  Max: 99 %  I/O last 3 completed shifts:  In: 2206.5 [P.O.:700; I.V.:910.2; IV Piggyback:596.2]  Out: 2855 [Urine:2200; Drains:655]    Physical Examination:  Constitutional: Awake, alert, no acute distress  Neurological: Alert and oriented, no focal deficits  Head/Neck: Full range of motion, no tenderness or stiffness  Cardiovascular: Regular rate and rhythm, no murmur  Pulmonary: Lungs clear, equal breath sounds  Abdominal: Soft, non tender, normal bowel sounds, surgical scars healing well  Musculoskeletal: Full range of motion, no edema    Laboratory:  Laboratory Data Reviewed:   Pertinent Findings:  Reviewed, stable    Microbiology Data Reviewed:   Pertinent Findings:  Blood cx 1/2- coag negative staph, likely contaminant    Other Results:  EKG (my interpretation):   Sinus tachycardia, rate 138. No ST elevations. Possible Left atrial enlargement     Echo :    Left Ventricle: The left ventricle is normal in size. Mildly increased wall thickness. There is concentric remodeling. Unable to assess wall motion. Endocardium not well visualized however systolic function appears preserved.  Consider repeat imaging with echo enhancing agent if indicated Diastolic function cannot be reliably  determined in the presence of mitral annular calcification.    Right Ventricle: Normal right ventricular cavity size. Systolic function is normal.    Aortic Valve: There is mild stenosis. Aortic valve peak velocity is 2.2 m/s. Mean gradient is 10.5 mmHg. The dimensionless index is 0.60.    Mitral Valve: There is mild regurgitation.    Radiology Data Reviewed:   Pertinent Findings:  Upper GI pending    Current Medications:     Infusions:   D5 and 0.45% NaCl   Intravenous Continuous PRN   Stopped at 11/23/24 0933        Scheduled:   clarithromycin  500 mg Oral Q12H    enoxparin  40 mg Subcutaneous Q24H (prophylaxis, 1700)    insulin glargine U-100  7 Units Subcutaneous Daily    metroNIDAZOLE  500 mg Oral Q8H    mupirocin   Nasal BID    pantoprazole  40 mg Oral BID        PRN:    Current Facility-Administered Medications:     acetaminophen, 1,000 mg, Oral, Q8H PRN    dextrose 10%, 12.5 g, Intravenous, PRN    dextrose 10%, 25 g, Intravenous, PRN    D5 and 0.45% NaCl, , Intravenous, Continuous PRN    glucagon (human recombinant), 1 mg, Intramuscular, PRN    insulin aspart U-100, 0-5 Units, Subcutaneous, Q6H PRN    labetalol, 10 mg, Intravenous, Q6H PRN    ondansetron, 4 mg, Intravenous, Q6H PRN    sodium chloride 0.9%, 10 mL, Intravenous, PRN    Assessment/Plan:     Al Anna is a 64 y.o. male with history of poorly-controlled T2DM c/b neuropathy and retinopathy, CKD-IIIb, HTN, HLD, and PAD c/b STI of L foot s/p partial amputation who presented to [facility] on 11/22/2024 with a primary complaint of sudden onset abdominal pain that started early the morning of presentation. He was found to be in DKA, with CT A/P highly concerning for bowel perforation. He had an ex lap with omental flap repair of 1.5cm perforated gastric ulcer, stepped down from ICU 11/24. Admitted with general surgery as primary and consulted to [facility] IM for management of DM2 and LOW.     Perforated gastric ulcer s/p omental patch  repair  - continued management per primary (surgery)      Poorly-controlled Diabetes Mellitus, Type II, w/o Insulin Use c/b Neuropathy, Retinopathy, and Current Diabetic Ketoacidosis, resolved   Patient presented with serum glucose of 520, HCO3 of 13 with anion gap of 24, and beta hydroxybutyrate of 2.3 all consistent with DKA. He denies any previous hx of DKA. Although his T2DM is not well controlled, his current DKA was likely induced by his suspected bowel perforation. Current home regimen includes Metformin 1000 mg bid, Actos 45 mg daily, Dapagliflozin 10 mg daily, and Gabapentin 300 mg qhs.  - continue home Lantus 7 U + SSI for correction  - glucose checks with meals and at bedtime  - Can resume home Gabapentin once cleared for PO intake  - will adjust SSI as needed once eating regular diet, glucose so far stable     Acute Kidney Injury on Chronic Kidney Disease, Stage III-b  Hyperkalemia  Patient has a hx of CKD with baseline Cr of 1.7-1.9 and GFR ~40 but presented with Cr of 2.5 and GFR of 28. Initial CrCl 45 mL/min. Etiology likely pre-renal.  Plan:  - FENa 0.2 indicating pre renal etiology, creatinine improving  - Renally dose meds as indicated  - Avoid nephrotoxins as able  - CMP with improving kidney function, continue daily  - creatinine back at baseline     Elevated Troponin, resolved  Patient presented with initial troponin elevation at 0.046. Extremely low concern for ACS at this time as EKG is non-concerning and mild troponin elevation is most likely secondary to stress given significant abdominal pain.  Plan:  - peaked at 1.160, likely stress induced in the setting of bowel perforation  - continue to monitor for chest pain and changes on tele     Peripheral Arterial Disease c/b Soft Tissue Infection of Left Foot s/p Partial Amputation and Arterial Stenting of Left Leg  Mixed Hyperlipidemia  Patient underwent trans-metatarsal amputation of left foot and arterial stenting of left leg in 02/2020 after  developing an infection and followed along for an extended course of time with Dr. Horner due to poor wound healing with long-term abx therapy. He is currently prescribed Rosuvastatin 5 mg daily.  - Resume Rosuvastatin once cleared for PO intake  - Continue to  patient on importance of smoking cessation     Primary Hypertension  Current home regimen is Lisinopril-HCTZ 20-25 mg daily. BP stable upon admission.  Plan:  - Recommend restarting home Lisinopril-HCTZ      Current Tobacco Use  Patient states that he started smoking at age 17 with an average of 1 ppd until recently cutting down to 0.5 ppd. He has an approximate 45 pack year hx but has never received LDCT Chest for lung cancer screening.  Plan:  - Continue to  patient on importance of smoking cessation  - Needs LDCT Chest as it has never been performed and he is an active smoker with significant pack year hx. Discuss with PCP on discharge.      Lumbar Radiculopathy  Patient currently prescribed Gabapentin 300 mg qhs and Tramadol 50 mg TID prn.  Plan:  - Can resume home Gabapentin once cleared for PO intake    Ppx: lovenox  Dispo: per primary    Herlinda Troy MD  [facility] Internal Medicine/Pediatrics HO-2    [facility] Medicine Hospitalist Pager numbers:   [facility] Hospitalist Medicine Team A (Lorena/Hope): [phone]  [facility] Hospitalist Medicine Team B (Marvin/Radha):  [phone]

## 2024-11-27 NOTE — CARE UPDATE
11/26/24 1954   PRE-TX-O2   Device (Oxygen Therapy) room air   SpO2 99 %   $ Pulse Oximetry - Multiple Charge Pulse Oximetry - Multiple   Incentive Spirometer   $ Incentive Spirometer Charges done independently per patient;ready for self-administration;proper technique demonstrated;breath hold utilized   Administration (IS) self-administered;mouthpiece utilized   Number of Repetitions (IS) 10   Level Incentive Spirometer (mL) 1900   Patient Tolerance (IS) no adverse signs/symptoms present

## 2024-11-27 NOTE — PROGRESS NOTES
[facility]  [facility]  Progress Note    Subjective:     History of Present Illness:  64M here with multiple days of abdominal pain. It has been much worse today. He has nausea and vomiting. Work-up revealed CT scan with free air.. He is also in DKA with surgar >500 initially. He has been started on an insulin ggt, given IV abx, and an IV PPI. He has never had abodminal surgery. He smokes 1/2 pack a day. He take naproxen but not every day. No evelyn or stroke. Walks around without issue. Independent.  Surgery consulted for free.     CT read: Findings compatible with perforated viscus with mild to moderate intraperitoneal free air and small amount of fluid more so in the upper abdomen and on the left. There is a round air-filled structure possibly representing a duodenal diverticulum. The possibility of peptic ulcer involving the adjacent gastric antrum is considered but it looks somewhat atypical for this diagnosis.     No white count but with left shift. . Elevated lactate. Elevated trops. LOW He is tachycardic to 129 but normal BP.     Post-Op Info:  Procedure(s) (LRB):  LAPAROTOMY, EXPLORATORY (N/A)  CLOSURE, ULCER, PERFORATED, DUODENUM, USING OMENTAL PATCH   4 Days Post-Op     Interval History: NAEON. Does admit one episode of nausea that was quickly relieved with zofran. Tolerating ice chips, water, and some clear liquids. Tired this morning. Overall doing well.    Medications:  Continuous Infusions:   D5 and 0.45% NaCl   Intravenous Continuous PRN   Stopped at 11/23/24 0933     Scheduled Meds:   clarithromycin  500 mg Oral Q12H    enoxparin  40 mg Subcutaneous Q24H (prophylaxis, 1700)    insulin glargine U-100  7 Units Subcutaneous Daily    metroNIDAZOLE  500 mg Oral Q8H    mupirocin   Nasal BID    pantoprazole  40 mg Oral BID     PRN Meds:  Current Facility-Administered Medications:     acetaminophen, 1,000 mg, Oral, Q8H PRN    dextrose 10%, 12.5 g, Intravenous, PRN    dextrose 10%, 25 g,  Intravenous, PRN    D5 and 0.45% NaCl, , Intravenous, Continuous PRN    glucagon (human recombinant), 1 mg, Intramuscular, PRN    insulin aspart U-100, 0-5 Units, Subcutaneous, Q6H PRN    labetalol, 10 mg, Intravenous, Q6H PRN    ondansetron, 4 mg, Intravenous, Q6H PRN    sodium chloride 0.9%, 10 mL, Intravenous, PRN     Review of patient's allergies indicates:   Allergen Reactions    Penicillins Rash     Objective:     Vital Signs (Most Recent):  Temp: 100.2 °F (37.9 °C) (11/27/24 0726)  Pulse: 92 (11/27/24 0726)  Resp: 18 (11/27/24 0726)  BP: (!) 154/69 (11/27/24 0726)  SpO2: 98 % (11/27/24 0736) Vital Signs (24h Range):  Temp:  [97.9 °F (36.6 °C)-100.2 °F (37.9 °C)] 100.2 °F (37.9 °C)  Pulse:  [] 92  Resp:  [16-20] 18  SpO2:  [96 %-99 %] 98 %  BP: (133-173)/(69-88) 154/69     Weight: 103 kg (227 lb 1.2 oz)  Body mass index is 28.38 kg/m².    Intake/Output - Last 3 Shifts         11/25 0700  11/26 0659 11/26 0700 11/27 0659 11/27 0700 11/28 0659    P.O. 0 700     I.V. (mL/kg) 910.2 (8.8)      IV Piggyback 596.2      Total Intake(mL/kg) 1506.5 (14.6) 700 (6.8)     Urine (mL/kg/hr) 1100 (0.4) 1100 (0.4)     Drains 450 355     Total Output 1550 1455     Net -43.6 -755                     Physical Exam  HENT:      Nose:      Comments: NGT in place  Eyes:      Extraocular Movements: Extraocular movements intact.   Cardiovascular:      Rate and Rhythm: Normal rate.   Pulmonary:      Effort: Pulmonary effort is normal.   Abdominal:      General: Abdomen is flat.      Palpations: Abdomen is soft.      Comments: Incisions with dressing in place, staples in place  RUQ drain benign with SS output    Skin:     General: Skin is warm and dry.      Capillary Refill: Capillary refill takes less than 2 seconds.   Neurological:      General: No focal deficit present.      Mental Status: He is alert.   Psychiatric:         Mood and Affect: Mood normal.         Behavior: Behavior normal.          Significant Labs:  Labs  reviewed.    Significant Diagnostics:  UGI reviewed.   Assessment/Plan:     * Free intraperitoneal air  64M here with abdominal pain found to be in DKA and gastric perforation, ex-lap with findings of gastric perforation, aashish patch repair performed on 11/23. H. Pylori positive. POD 3 UGI without evidence of leak. Doing well.    CLD  BID PPI  H. Pylori ab, positive   Clarithromycin   Metronidazole (penicillin allergy)   PPI   Replace phos  Continue strict glucose control for post-op ulcer healing and wound healing   DVT ppx     Dispo: 11/27 vs 11/28 pending toleration of diet and HH set up        Richie Stover MD  General Surgery  [facility]

## 2024-11-27 NOTE — SUBJECTIVE & OBJECTIVE
Interval History: NAEON. Does admit one episode of nausea that was quickly relieved with zofran. Tolerating ice chips, water, and some clear liquids. Tired this morning. Overall doing well.    Medications:  Continuous Infusions:   D5 and 0.45% NaCl   Intravenous Continuous PRN   Stopped at 11/23/24 0933     Scheduled Meds:   clarithromycin  500 mg Oral Q12H    enoxparin  40 mg Subcutaneous Q24H (prophylaxis, 1700)    insulin glargine U-100  7 Units Subcutaneous Daily    metroNIDAZOLE  500 mg Oral Q8H    mupirocin   Nasal BID    pantoprazole  40 mg Oral BID     PRN Meds:  Current Facility-Administered Medications:     acetaminophen, 1,000 mg, Oral, Q8H PRN    dextrose 10%, 12.5 g, Intravenous, PRN    dextrose 10%, 25 g, Intravenous, PRN    D5 and 0.45% NaCl, , Intravenous, Continuous PRN    glucagon (human recombinant), 1 mg, Intramuscular, PRN    insulin aspart U-100, 0-5 Units, Subcutaneous, Q6H PRN    labetalol, 10 mg, Intravenous, Q6H PRN    ondansetron, 4 mg, Intravenous, Q6H PRN    sodium chloride 0.9%, 10 mL, Intravenous, PRN     Review of patient's allergies indicates:   Allergen Reactions    Penicillins Rash     Objective:     Vital Signs (Most Recent):  Temp: 100.2 °F (37.9 °C) (11/27/24 0726)  Pulse: 92 (11/27/24 0726)  Resp: 18 (11/27/24 0726)  BP: (!) 154/69 (11/27/24 0726)  SpO2: 98 % (11/27/24 0736) Vital Signs (24h Range):  Temp:  [97.9 °F (36.6 °C)-100.2 °F (37.9 °C)] 100.2 °F (37.9 °C)  Pulse:  [] 92  Resp:  [16-20] 18  SpO2:  [96 %-99 %] 98 %  BP: (133-173)/(69-88) 154/69     Weight: 103 kg (227 lb 1.2 oz)  Body mass index is 28.38 kg/m².    Intake/Output - Last 3 Shifts         11/25 0700 11/26 0659 11/26 0700 11/27 0659 11/27 0700  11/28 0659    P.O. 0 700     I.V. (mL/kg) 910.2 (8.8)      IV Piggyback 596.2      Total Intake(mL/kg) 1506.5 (14.6) 700 (6.8)     Urine (mL/kg/hr) 1100 (0.4) 1100 (0.4)     Drains 450 355     Total Output 1550 1455     Net -43.6 -755                      Physical Exam  HENT:      Nose:      Comments: NGT in place  Eyes:      Extraocular Movements: Extraocular movements intact.   Cardiovascular:      Rate and Rhythm: Normal rate.   Pulmonary:      Effort: Pulmonary effort is normal.   Abdominal:      General: Abdomen is flat.      Palpations: Abdomen is soft.      Comments: Incisions with dressing in place, staples in place  RUQ drain benign with SS output    Skin:     General: Skin is warm and dry.      Capillary Refill: Capillary refill takes less than 2 seconds.   Neurological:      General: No focal deficit present.      Mental Status: He is alert.   Psychiatric:         Mood and Affect: Mood normal.         Behavior: Behavior normal.          Significant Labs:  Labs reviewed.    Significant Diagnostics:  UGI reviewed.

## 2024-11-27 NOTE — PLAN OF CARE
Problem: Adult Inpatient Plan of Care  Goal: Patient-Specific Goal (Individualized)  Outcome: Progressing     Problem: Wound  Goal: Optimal Functional Ability  Outcome: Progressing  Goal: Optimal Pain Control and Function  Outcome: Progressing     Problem: Skin Injury Risk Increased  Goal: Skin Health and Integrity  Outcome: Progressing     Problem: Fall Injury Risk  Goal: Absence of Fall and Fall-Related Injury  Outcome: Progressing     Aaox4. Medications administered per MAR. Clear liquid diet with 800mL fluid restriction. Tele monitored. ANALIA drain in place. Glucose monitored. Safety precautions maintained. Call bell within reach. 1x episode of nausea, no emesis.

## 2024-11-28 VITALS
SYSTOLIC BLOOD PRESSURE: 122 MMHG | WEIGHT: 242.94 LBS | BODY MASS INDEX: 30.21 KG/M2 | DIASTOLIC BLOOD PRESSURE: 62 MMHG | HEIGHT: 75 IN | HEART RATE: 94 BPM | RESPIRATION RATE: 18 BRPM | OXYGEN SATURATION: 98 % | TEMPERATURE: 98 F

## 2024-11-28 LAB
ALBUMIN SERPL BCP-MCNC: 1.8 G/DL (ref 3.5–5.2)
ALP SERPL-CCNC: 127 U/L (ref 40–150)
ALT SERPL W/O P-5'-P-CCNC: 15 U/L (ref 10–44)
ANION GAP SERPL CALC-SCNC: 11 MMOL/L (ref 8–16)
ANISOCYTOSIS BLD QL SMEAR: SLIGHT
AST SERPL-CCNC: 30 U/L (ref 10–40)
BACTERIA BLD CULT: NORMAL
BASOPHILS NFR BLD: 0 % (ref 0–1.9)
BILIRUB SERPL-MCNC: 0.3 MG/DL (ref 0.1–1)
BUN SERPL-MCNC: 12 MG/DL (ref 8–23)
CALCIUM SERPL-MCNC: 8.3 MG/DL (ref 8.7–10.5)
CHLORIDE SERPL-SCNC: 107 MMOL/L (ref 95–110)
CO2 SERPL-SCNC: 19 MMOL/L (ref 23–29)
CREAT SERPL-MCNC: 0.8 MG/DL (ref 0.5–1.4)
DIFFERENTIAL METHOD BLD: ABNORMAL
EOSINOPHIL NFR BLD: 2 % (ref 0–8)
ERYTHROCYTE [DISTWIDTH] IN BLOOD BY AUTOMATED COUNT: 15.4 % (ref 11.5–14.5)
EST. GFR  (NO RACE VARIABLE): >60 ML/MIN/1.73 M^2
GLUCOSE SERPL-MCNC: 98 MG/DL (ref 70–110)
HCT VFR BLD AUTO: 33.3 % (ref 40–54)
HGB BLD-MCNC: 11 G/DL (ref 14–18)
HYPOCHROMIA BLD QL SMEAR: ABNORMAL
IMM GRANULOCYTES # BLD AUTO: ABNORMAL K/UL (ref 0–0.04)
IMM GRANULOCYTES NFR BLD AUTO: ABNORMAL % (ref 0–0.5)
LYMPHOCYTES NFR BLD: 23 % (ref 18–48)
MAGNESIUM SERPL-MCNC: 1.9 MG/DL (ref 1.6–2.6)
MCH RBC QN AUTO: 28 PG (ref 27–31)
MCHC RBC AUTO-ENTMCNC: 33 G/DL (ref 32–36)
MCV RBC AUTO: 85 FL (ref 82–98)
METAMYELOCYTES NFR BLD MANUAL: 1 %
MONOCYTES NFR BLD: 11 % (ref 4–15)
MYELOCYTES NFR BLD MANUAL: 2 %
NEUTROPHILS NFR BLD: 60 % (ref 38–73)
NEUTS BAND NFR BLD MANUAL: 1 %
NRBC BLD-RTO: 0 /100 WBC
PHOSPHATE SERPL-MCNC: 2.5 MG/DL (ref 2.7–4.5)
PLATELET # BLD AUTO: 217 K/UL (ref 150–450)
PLATELET BLD QL SMEAR: ABNORMAL
PMV BLD AUTO: 10.8 FL (ref 9.2–12.9)
POCT GLUCOSE: 134 MG/DL (ref 70–110)
POCT GLUCOSE: 166 MG/DL (ref 70–110)
POTASSIUM SERPL-SCNC: 3.4 MMOL/L (ref 3.5–5.1)
PROT SERPL-MCNC: 5.3 G/DL (ref 6–8.4)
RBC # BLD AUTO: 3.93 M/UL (ref 4.6–6.2)
SODIUM SERPL-SCNC: 137 MMOL/L (ref 136–145)
WBC # BLD AUTO: 11.58 K/UL (ref 3.9–12.7)

## 2024-11-28 PROCEDURE — 80053 COMPREHEN METABOLIC PANEL: CPT

## 2024-11-28 PROCEDURE — 85007 BL SMEAR W/DIFF WBC COUNT: CPT

## 2024-11-28 PROCEDURE — 94799 UNLISTED PULMONARY SVC/PX: CPT

## 2024-11-28 PROCEDURE — 36415 COLL VENOUS BLD VENIPUNCTURE: CPT

## 2024-11-28 PROCEDURE — 25000003 PHARM REV CODE 250

## 2024-11-28 PROCEDURE — 83735 ASSAY OF MAGNESIUM: CPT

## 2024-11-28 PROCEDURE — 85027 COMPLETE CBC AUTOMATED: CPT

## 2024-11-28 PROCEDURE — 94761 N-INVAS EAR/PLS OXIMETRY MLT: CPT

## 2024-11-28 PROCEDURE — 84100 ASSAY OF PHOSPHORUS: CPT

## 2024-11-28 RX ORDER — SODIUM,POTASSIUM PHOSPHATES 280-250MG
2 POWDER IN PACKET (EA) ORAL ONCE
Status: COMPLETED | OUTPATIENT
Start: 2024-11-28 | End: 2024-11-28

## 2024-11-28 RX ORDER — HYDROCODONE BITARTRATE AND ACETAMINOPHEN 5; 325 MG/1; MG/1
1 TABLET ORAL EVERY 6 HOURS PRN
Qty: 10 TABLET | Refills: 0 | Status: SHIPPED | OUTPATIENT
Start: 2024-11-28 | End: 2024-12-05

## 2024-11-28 RX ORDER — POTASSIUM CHLORIDE 20 MEQ/1
20 TABLET, EXTENDED RELEASE ORAL ONCE
Status: COMPLETED | OUTPATIENT
Start: 2024-11-28 | End: 2024-11-28

## 2024-11-28 RX ORDER — METRONIDAZOLE 500 MG/1
500 TABLET ORAL EVERY 8 HOURS
Qty: 36 TABLET | Refills: 0 | Status: SHIPPED | OUTPATIENT
Start: 2024-11-28 | End: 2024-12-10

## 2024-11-28 RX ORDER — PANTOPRAZOLE SODIUM 40 MG/1
40 TABLET, DELAYED RELEASE ORAL 2 TIMES DAILY
Qty: 180 TABLET | Refills: 3 | Status: SHIPPED | OUTPATIENT
Start: 2024-11-28 | End: 2024-11-28

## 2024-11-28 RX ORDER — PANTOPRAZOLE SODIUM 40 MG/1
40 TABLET, DELAYED RELEASE ORAL 2 TIMES DAILY
Qty: 180 TABLET | Refills: 3 | Status: SHIPPED | OUTPATIENT
Start: 2024-11-28 | End: 2025-11-28

## 2024-11-28 RX ORDER — CLARITHROMYCIN 500 MG/1
500 TABLET, FILM COATED ORAL EVERY 12 HOURS
Qty: 24 TABLET | Refills: 0 | Status: SHIPPED | OUTPATIENT
Start: 2024-11-28 | End: 2024-11-28

## 2024-11-28 RX ORDER — ACETAMINOPHEN 325 MG/1
650 TABLET ORAL EVERY 8 HOURS PRN
Status: DISCONTINUED | OUTPATIENT
Start: 2024-11-28 | End: 2024-11-28 | Stop reason: HOSPADM

## 2024-11-28 RX ORDER — CLARITHROMYCIN 500 MG/1
500 TABLET, FILM COATED ORAL EVERY 12 HOURS
Qty: 24 TABLET | Refills: 0 | Status: SHIPPED | OUTPATIENT
Start: 2024-11-28 | End: 2024-12-10

## 2024-11-28 RX ORDER — METRONIDAZOLE 500 MG/1
500 TABLET ORAL EVERY 8 HOURS
Qty: 36 TABLET | Refills: 0 | Status: SHIPPED | OUTPATIENT
Start: 2024-11-28 | End: 2024-11-28

## 2024-11-28 RX ADMIN — METRONIDAZOLE 500 MG: 500 TABLET ORAL at 05:11

## 2024-11-28 RX ADMIN — CLARITHROMYCIN 500 MG: 500 TABLET, FILM COATED ORAL at 08:11

## 2024-11-28 RX ADMIN — PANTOPRAZOLE SODIUM 40 MG: 40 TABLET, DELAYED RELEASE ORAL at 08:11

## 2024-11-28 RX ADMIN — METRONIDAZOLE 500 MG: 500 TABLET ORAL at 01:11

## 2024-11-28 RX ADMIN — POTASSIUM & SODIUM PHOSPHATES POWDER PACK 280-160-250 MG 2 PACKET: 280-160-250 PACK at 10:11

## 2024-11-28 RX ADMIN — POTASSIUM CHLORIDE 20 MEQ: 1500 TABLET, EXTENDED RELEASE ORAL at 10:11

## 2024-11-28 RX ADMIN — INSULIN GLARGINE 7 UNITS: 100 INJECTION, SOLUTION SUBCUTANEOUS at 08:11

## 2024-11-28 NOTE — HOSPITAL COURSE
Mr. Anna presented to [facility] on the morning of 11/23/2024 for abdominal pain which was found to be a perforated gastric ulcer, he was taken for exploratory laparotomy and aashish patch repair. The procedure was performed without complication and he was transferred to the floor for further post op care and monitoring. His postoperative course was uneventful and he progressed according to plan.  His pain was controlled with a combination of IV and PO multimodal pain medications. His diet has been advanced throughout his hospital stay. He is now ambulating without assistance, tolerating a regular diet, pain is well controlled with PO pain medication, and he is voiding appropriately. He now meets all criteria for discharge.

## 2024-11-28 NOTE — DISCHARGE SUMMARY
[facility] Surg  General Surgery  Discharge Summary      Patient Name: Al Anna  MRN: [id number]  Admission Date: 11/22/2024  Hospital Length of Stay: 6 days  Discharge Date and Time:  11/28/2024 9:06 AM  Attending Physician: Ghanshyam Arellano MD   Discharging Provider: Richie Stover MD  Primary Care Provider: Smith Olmos MD    HPI:   64M here with multiple days of abdominal pain. It has been much worse today. He has nausea and vomiting. Work-up revealed CT scan with free air.. He is also in DKA with surgar >500 initially. He has been started on an insulin ggt, given IV abx, and an IV PPI. He has never had abodminal surgery. He smokes 1/2 pack a day. He take naproxen but not every day. No evelyn or stroke. Walks around without issue. Independent.  Surgery consulted for free.     CT read: Findings compatible with perforated viscus with mild to moderate intraperitoneal free air and small amount of fluid more so in the upper abdomen and on the left. There is a round air-filled structure possibly representing a duodenal diverticulum. The possibility of peptic ulcer involving the adjacent gastric antrum is considered but it looks somewhat atypical for this diagnosis.     No white count but with left shift. . Elevated lactate. Elevated trops. LOW He is tachycardic to 129 but normal BP.     Procedure(s) (LRB):  LAPAROTOMY, EXPLORATORY (N/A)  CLOSURE, ULCER, PERFORATED, DUODENUM, USING OMENTAL PATCH      Indwelling Lines/Drains at time of discharge:   Lines/Drains/Airways       Drain  Duration                  Closed/Suction Drain 11/23/24 0233 Tube - 1 Inferior;Medial Bulb 10 Fr. 5 days                  Hospital Course: Mr. Anna presented to [facility] on the morning of 11/23/2024 for abdominal pain which was found to be a perforated gastric ulcer, he was taken for exploratory laparotomy and aashish patch repair. The procedure was performed without complication and he was transferred to the floor for further post op  care and monitoring. His postoperative course was uneventful and he progressed according to plan.  His pain was controlled with a combination of IV and PO multimodal pain medications. His diet has been advanced throughout his hospital stay. He is now ambulating without assistance, tolerating a regular diet, pain is well controlled with PO pain medication, and he is voiding appropriately. He now meets all criteria for discharge.    Physical Exam  Constitutional:       Appearance: Normal appearance.   HENT:      Head: Normocephalic and atraumatic.      Mouth/Throat:      Mouth: Mucous membranes are moist.   Eyes:      Extraocular Movements: Extraocular movements intact.   Cardiovascular:      Rate and Rhythm: Normal rate and regular rhythm.   Pulmonary:      Effort: Pulmonary effort is normal.   Abdominal:      General: Abdomen is flat.      Palpations: Abdomen is soft.      Comments: Drain in place  Abdomen soft, nontender, non-distended  Staple line clean and dry   Skin:     General: Skin is warm and dry.   Neurological:      General: No focal deficit present.      Mental Status: He is alert.   Psychiatric:         Mood and Affect: Mood normal.         Behavior: Behavior normal.           Goals of Care Treatment Preferences:  Code Status: Full Code      Consults:   Consults (From admission, onward)          Status Ordering Provider     Inpatient consult to Internal Medicine-[facility]  Once        Provider:  Neil Garcia MD    Completed MERON AVITIA     Inpatient consult to Registered Dietitian/Nutritionist  Once        Provider:  (Not yet assigned)    WILIAM Long            Significant Diagnostic Studies: Labs: CMP   Recent Labs   Lab 11/27/24  0458 11/28/24  0536    137   K 3.6 3.4*    107   CO2 20* 19*   GLU 85 98   BUN 17 12   CREATININE 0.9 0.8   CALCIUM 8.5* 8.3*   PROT 5.4* 5.3*   ALBUMIN 1.9* 1.8*   BILITOT 0.4 0.3   ALKPHOS 99 127   AST 21 30   ALT 10 15   ANIONGAP 10 11    and  CBC   Recent Labs   Lab 11/27/24  0458 11/28/24  0536   WBC 9.40 11.58   HGB 11.1* 11.0*   HCT 34.3* 33.3*    217       Pending Diagnostic Studies:       None          Final Active Diagnoses:    Diagnosis Date Noted POA    PRINCIPAL PROBLEM:  Free intraperitoneal air [K66.8] 11/22/2024 Yes    Hyperglycemia [R73.9] 11/23/2024 Yes    Hyperkalemia [E87.5] 11/23/2024 Yes    Perforated abdominal viscus [R19.8] 11/22/2024 Yes    Diabetic ketoacidosis without coma associated with type 2 diabetes mellitus [E11.10] 11/22/2024 Yes    Type 2 diabetes with peripheral artery disease [E11.51] 05/21/2018 Yes     Chronic    High cholesterol [E78.00] 02/01/2017 Yes    Essential hypertension [I10] 04/13/2016 Yes     Chronic      Problems Resolved During this Admission:      Discharged Condition: good    Disposition: [place] [facility]    Follow Up:   Follow-up Information       Ghanshyam Arellano MD Follow up in 1 week(s).    Specialties: General Surgery, Endocrinology, Surgery  Why: For staple removal  Contact information:  [address] [facility]  [address]  [facility] [id number] [phone]                           Patient Instructions:      Ambulatory referral/consult to Smoking Cessation Program   Standing Status: Future   Referral Priority: Routine Referral Type: Consultation   Referral Reason: Specialty Services Required   Requested Specialty: CTTS   Number of Visits Requested: 1     Ambulatory referral/consult to Gastroenterology   Standing Status: Future   Referral Priority: Routine Referral Type: Consultation   Referral Reason: Specialty Services Required   Requested Specialty: Gastroenterology   Number of Visits Requested: 1     Notify your health care provider if you experience any of the following:  increased confusion or weakness     Notify your health care provider if you experience any of the following:  persistent dizziness, light-headedness, or visual disturbances     Notify your health care provider if you experience  any of the following:  worsening rash     Notify your health care provider if you experience any of the following:  severe persistent headache     Notify your health care provider if you experience any of the following:  difficulty breathing or increased cough     Notify your health care provider if you experience any of the following:  redness, tenderness, or signs of infection (pain, swelling, redness, odor or green/yellow discharge around incision site)     Notify your health care provider if you experience any of the following:  severe uncontrolled pain     Notify your health care provider if you experience any of the following:  temperature >100.4     Notify your health care provider if you experience any of the following:  persistent nausea and vomiting or diarrhea     Reason for not Prescribing Nicotine Replacement     Order Specific Question Answer Comments   Reason for not Prescribing: Not medically appropriate at this time      Medications:  Reconciled Home Medications:      Medication List        START taking these medications      clarithromycin 500 MG tablet  Commonly known as: BIAXIN  Take 1 tablet (500 mg total) by mouth every 12 (twelve) hours. for 12 days     metroNIDAZOLE 500 MG tablet  Commonly known as: FLAGYL  Take 1 tablet (500 mg total) by mouth every 8 (eight) hours. for 12 days     pantoprazole 40 MG tablet  Commonly known as: PROTONIX  Take 1 tablet (40 mg total) by mouth 2 (two) times daily.            CONTINUE taking these medications      acetaminophen 500 MG tablet  Commonly known as: TYLENOL  Take 1,000 mg by mouth every 6 (six) hours as needed for Pain.     FARXIGA 10 mg tablet  Generic drug: dapagliflozin propanediol  TAKE 1 TABLET BY MOUTH ONCE DAILY FOR DIABETES     gabapentin 100 MG capsule  Commonly known as: NEURONTIN  Take 3 capsules (300 mg total) by mouth every evening.     gentamicin 0.1 % ointment  Commonly known as: GARAMYCIN  Apply topically 3 (three) times daily.      HYDROcodone-acetaminophen 5-325 mg per tablet  Commonly known as: NORCO  Take 1 tablet by mouth every 6 (six) hours as needed for Pain.     ibuprofen 800 MG tablet  Commonly known as: ADVIL,MOTRIN  Take 800 mg by mouth 3 (three) times daily.     lisinopriL-hydrochlorothiazide 20-25 mg Tab  Commonly known as: PRINZIDE,ZESTORETIC  Take 1 tablet by mouth once daily     meclizine 25 mg tablet  Commonly known as: ANTIVERT  TAKE 1 TABLET BY MOUTH THREE TIMES DAILY AS NEEDED FOR DIZZINESS     metFORMIN 500 MG ER 24hr tablet  Commonly known as: GLUCOPHAGE-XR  Take 1,000 mg by mouth 2 (two) times daily.     mupirocin 2 % ointment  Commonly known as: BACTROBAN  Apply topically 3 (three) times daily. Apply locally every other day     ondansetron 4 MG Tbdl  Commonly known as: ZOFRAN-ODT  Take 1 tablet (4 mg total) by mouth every 6 (six) hours as needed (Nausea).     OZEMPIC 2 mg/dose (8 mg/3 mL) Pnij  Generic drug: semaglutide  Inject 2 mg into the skin every 7 days.     pioglitazone 45 MG tablet  Commonly known as: ACTOS  Take 1 tablet (45 mg total) by mouth once daily.     rosuvastatin 5 MG tablet  Commonly known as: CRESTOR  Take 1 tablet by mouth once daily     SantyL ointment  Generic drug: collagenase  Apply topically once daily. Apply locally evru other day.     traMADoL 50 mg tablet  Commonly known as: ULTRAM  Take 1 tablet (50 mg total) by mouth 3 (three) times daily as needed for Pain.     traZODone 100 MG tablet  Commonly known as: DESYREL  Take 1 tablet (100 mg total) by mouth every evening.     triamcinolone acetonide 0.1% 0.1 % paste  Commonly known as: KENALOG  SMARTSIG:Patch(s) Topical 4 Times Daily            STOP taking these medications      ciprofloxacin HCl 500 MG tablet  Commonly known as: CIPRO     doxycycline 100 MG tablet  Commonly known as: VIBRA-TABS            Time spent on the discharge of patient: 15 minutes    Richie Stover MD  General Surgery  [facility] Surg

## 2024-11-28 NOTE — PLAN OF CARE
Problem: Adult Inpatient Plan of Care  Goal: Plan of Care Review  Outcome: Progressing     Problem: Wound  Goal: Skin Health and Integrity  Outcome: Progressing  Goal: Optimal Wound Healing  Outcome: Progressing     Problem: Fall Injury Risk  Goal: Absence of Fall and Fall-Related Injury  Outcome: Progressing     Problem: Infection  Goal: Absence of Infection Signs and Symptoms  Outcome: Progressing     Problem: Comorbidity Management  Goal: Blood Pressure in Desired Range  Outcome: Progressing

## 2024-11-28 NOTE — PLAN OF CARE
VN note: Care plan, orders and labs reviewed.  AVS and educational attachments shared with patient and wife via [employer]. Discussed thoroughly. Patient and wife verbalized clear understanding using teach back method. Notified bedside nurse of completion of education. At present no distress noted. Patient to be discharged via w/c with escort service and family with all of patient's belonings. Will cont to be available to patient and family and intervene prn.

## 2024-11-28 NOTE — PLAN OF CARE
VN note: VN completed AVS and attachments and notified bedside nurse, Dali. Will cont to be available and intervene prn.

## 2024-11-28 NOTE — PLAN OF CARE
[facility] Hospital Medicine Plan of Care Note     Primary Attending Physician: Dr. Carrion  Primary Team: General Surgery  Consultant Attending: Dr. Carrillo  Consultant Resident: Herlinda Richey/Skylar Aguilar    Reason for Consult:      Glucose control, LOW, and other comorbidities    Assessment/Plan:      Al Anna is a 64 y.o. male with history of poorly-controlled T2DM c/b neuropathy and retinopathy, CKD-IIIb, HTN, HLD, and PAD c/b STI of L foot s/p partial amputation who presented to [facility] on 11/22/2024 with a small bowel obstruction and was found to be in DKA. He had an omental flap repair of a 1.5cm perforated gastric ulcer, stepped down from ICU 11/24, and admitted to general surgery. U medicine was consulted for DM2 and LOW.     Poorly-controlled Diabetes Mellitus, Type II, w/o Insulin Use c/b Neuropathy, Retinopathy  DKA, resolved  - while inpatient, continued home Lantus 7 U + SSI for correction  - glucose have been well controlled during admission, however, patient was NPO for majority of the time   - patient reports poor adherence to diabetic medications at home. Repeat A1c while here of 9.5.   - final recommendations: continue regimen:    - dapagliflozin (farxiga) 10 mg once daily    - metformin XR 1000 mg BID    - pioglitazone 45 mg daily    - semaglutide (ozempic) 2 mg subQ weekly   - continue home gabapentin 300 mg nightly   - continue home crestor 5 mg daily   - encourage close follow up with PCP    Acute Kidney Injury on Chronic Kidney Disease, Stage III-b   - creatinine back to baseline on day of discharge     Final recommendations were discussed with staff, Dr. Carrillo.     Skylar Aguilar MD   [facility] Internal Medicine, PGY-I

## 2024-12-09 ENCOUNTER — OFFICE VISIT (OUTPATIENT)
Dept: SURGERY | Facility: CLINIC | Age: 64
End: 2024-12-09
Payer: MEDICARE

## 2024-12-09 ENCOUNTER — LAB VISIT (OUTPATIENT)
Dept: LAB | Facility: HOSPITAL | Age: 64
End: 2024-12-09
Attending: FAMILY MEDICINE
Payer: MEDICARE

## 2024-12-09 VITALS
SYSTOLIC BLOOD PRESSURE: 121 MMHG | WEIGHT: 192.44 LBS | DIASTOLIC BLOOD PRESSURE: 80 MMHG | HEART RATE: 125 BPM | BODY MASS INDEX: 23.93 KG/M2 | HEIGHT: 75 IN

## 2024-12-09 DIAGNOSIS — E11.65 TYPE 2 DIABETES MELLITUS WITH HYPERGLYCEMIA, WITHOUT LONG-TERM CURRENT USE OF INSULIN: ICD-10-CM

## 2024-12-09 DIAGNOSIS — R19.8 PERFORATED ABDOMINAL VISCUS: Primary | ICD-10-CM

## 2024-12-09 LAB
ANION GAP SERPL CALC-SCNC: 16 MMOL/L (ref 8–16)
BUN SERPL-MCNC: 9 MG/DL (ref 8–23)
CALCIUM SERPL-MCNC: 9.2 MG/DL (ref 8.7–10.5)
CHLORIDE SERPL-SCNC: 104 MMOL/L (ref 95–110)
CO2 SERPL-SCNC: 22 MMOL/L (ref 23–29)
CREAT SERPL-MCNC: 1.2 MG/DL (ref 0.5–1.4)
EST. GFR  (NO RACE VARIABLE): >60 ML/MIN/1.73 M^2
ESTIMATED AVG GLUCOSE: 226 MG/DL (ref 68–131)
GLUCOSE SERPL-MCNC: 288 MG/DL (ref 70–110)
HBA1C MFR BLD: 9.5 % (ref 4–5.6)
POTASSIUM SERPL-SCNC: 3.4 MMOL/L (ref 3.5–5.1)
SODIUM SERPL-SCNC: 142 MMOL/L (ref 136–145)

## 2024-12-09 PROCEDURE — 3074F SYST BP LT 130 MM HG: CPT | Mod: CPTII,S$GLB,, | Performed by: STUDENT IN AN ORGANIZED HEALTH CARE EDUCATION/TRAINING PROGRAM

## 2024-12-09 PROCEDURE — 3079F DIAST BP 80-89 MM HG: CPT | Mod: CPTII,S$GLB,, | Performed by: STUDENT IN AN ORGANIZED HEALTH CARE EDUCATION/TRAINING PROGRAM

## 2024-12-09 PROCEDURE — 80048 BASIC METABOLIC PNL TOTAL CA: CPT | Performed by: FAMILY MEDICINE

## 2024-12-09 PROCEDURE — 99024 POSTOP FOLLOW-UP VISIT: CPT | Mod: S$GLB,,, | Performed by: STUDENT IN AN ORGANIZED HEALTH CARE EDUCATION/TRAINING PROGRAM

## 2024-12-09 PROCEDURE — 3060F POS MICROALBUMINURIA REV: CPT | Mod: CPTII,S$GLB,, | Performed by: STUDENT IN AN ORGANIZED HEALTH CARE EDUCATION/TRAINING PROGRAM

## 2024-12-09 PROCEDURE — 83036 HEMOGLOBIN GLYCOSYLATED A1C: CPT | Performed by: FAMILY MEDICINE

## 2024-12-09 PROCEDURE — 1159F MED LIST DOCD IN RCRD: CPT | Mod: CPTII,S$GLB,, | Performed by: STUDENT IN AN ORGANIZED HEALTH CARE EDUCATION/TRAINING PROGRAM

## 2024-12-09 PROCEDURE — 4010F ACE/ARB THERAPY RXD/TAKEN: CPT | Mod: CPTII,S$GLB,, | Performed by: STUDENT IN AN ORGANIZED HEALTH CARE EDUCATION/TRAINING PROGRAM

## 2024-12-09 PROCEDURE — 3066F NEPHROPATHY DOC TX: CPT | Mod: CPTII,S$GLB,, | Performed by: STUDENT IN AN ORGANIZED HEALTH CARE EDUCATION/TRAINING PROGRAM

## 2024-12-09 PROCEDURE — 3046F HEMOGLOBIN A1C LEVEL >9.0%: CPT | Mod: CPTII,S$GLB,, | Performed by: STUDENT IN AN ORGANIZED HEALTH CARE EDUCATION/TRAINING PROGRAM

## 2024-12-09 PROCEDURE — 99999 PR PBB SHADOW E&M-EST. PATIENT-LVL III: CPT | Mod: PBBFAC,,, | Performed by: STUDENT IN AN ORGANIZED HEALTH CARE EDUCATION/TRAINING PROGRAM

## 2024-12-09 NOTE — PROGRESS NOTES
[facility] General Surgery - Post operative visit note          Mr. Anna returns to clinic for routine postoperative visit after undergoing aashish patch on 11/23/2024.    He has no acute complaints; he is tolerating a diet without issue, pain is minimal and controlled with OTC medications, ambulating and performing ADLs without issue, and incisions are clean/dry/intact with no gross signs of infection.     Staples removed    Lifting restrictions for another 4 weeks.     He has follow up with his PCP soon - they can consider referral to GI.     We will plan to followup in clinic PRN.         Ghanshyam Arellano MD, FACS  General and Endocrine Surgery

## 2024-12-20 ENCOUNTER — TELEPHONE (OUTPATIENT)
Dept: GASTROENTEROLOGY | Facility: CLINIC | Age: 64
End: 2024-12-20
Payer: MEDICARE

## 2024-12-20 NOTE — TELEPHONE ENCOUNTER
Clinic appt scheduled with Cassia, wife, on Tuesday, January 7, 2025 at 230pm from referral by Dr. Olmos.  Clinic address given with instructions to check in on 1st floor MOB prior to coming to [address].  Cassia repeated correctly.

## 2025-01-06 ENCOUNTER — TELEPHONE (OUTPATIENT)
Dept: SMOKING CESSATION | Facility: CLINIC | Age: 65
End: 2025-01-06
Payer: MEDICARE

## 2025-01-06 NOTE — TELEPHONE ENCOUNTER
Called and left message about missed appointment. Left message to call BHUMIKA Sheets @ [phone].

## 2025-01-07 ENCOUNTER — OFFICE VISIT (OUTPATIENT)
Dept: GASTROENTEROLOGY | Facility: CLINIC | Age: 65
End: 2025-01-07
Payer: MEDICARE

## 2025-01-07 VITALS
HEIGHT: 75 IN | BODY MASS INDEX: 26.15 KG/M2 | SYSTOLIC BLOOD PRESSURE: 119 MMHG | WEIGHT: 210.31 LBS | HEART RATE: 115 BPM | DIASTOLIC BLOOD PRESSURE: 74 MMHG

## 2025-01-07 DIAGNOSIS — K25.9 ULCER OF PYLORIC ANTRUM, UNSPECIFIED CHRONICITY: ICD-10-CM

## 2025-01-07 DIAGNOSIS — Z87.11 HISTORY OF GASTRIC ULCER: Primary | ICD-10-CM

## 2025-01-07 PROCEDURE — 99999 PR PBB SHADOW E&M-EST. PATIENT-LVL IV: CPT | Mod: PBBFAC,,, | Performed by: INTERNAL MEDICINE

## 2025-01-07 PROCEDURE — 1159F MED LIST DOCD IN RCRD: CPT | Mod: CPTII,S$GLB,, | Performed by: INTERNAL MEDICINE

## 2025-01-07 PROCEDURE — 3008F BODY MASS INDEX DOCD: CPT | Mod: CPTII,S$GLB,, | Performed by: INTERNAL MEDICINE

## 2025-01-07 PROCEDURE — 3074F SYST BP LT 130 MM HG: CPT | Mod: CPTII,S$GLB,, | Performed by: INTERNAL MEDICINE

## 2025-01-07 PROCEDURE — 3078F DIAST BP <80 MM HG: CPT | Mod: CPTII,S$GLB,, | Performed by: INTERNAL MEDICINE

## 2025-01-07 PROCEDURE — 99214 OFFICE O/P EST MOD 30 MIN: CPT | Mod: S$GLB,,, | Performed by: INTERNAL MEDICINE

## 2025-01-07 NOTE — PATIENT INSTRUCTIONS
EGD Instructions     [facility] [address]  Clinic Office [phone]  Endoscopy Lab [phone]     You are scheduled for an EGD with Dr. Boyd on  Thursday, March 13, 2025 at [facility] in [place].    Check in at the Hospital -1st floor, Information desk.   Call [phone] to reschedule.     You cannot have anything to eat or drink after Midnight. You can brush your teeth with a sip of water.      An adult friend/family member must come with you to drive you home.  You cannot drive, take a taxi, Uber/Lyft or bus to leave the Endoscopy Center alone.  If you do not have someone to drive you home, your test will be cancelled.      Please follow the directions of your doctor if you take any pills that thin your blood. If you take these meds: Aggrenox, Brilinta, Effient, Eliquis, Lovenox, Plavix, Pletal, Pradaxa, Ticilid, Xarelto or Coumadin, let the doctor's office know.     Please hold any GLP-1 medications prior to the procedure: Dulaglutide Trulicity(hold week prior), Exenatide Byetta (hold the morning of procedure), Semaglutide Ozempic (hold week prior), Liraglutide Victoza, Saxenda(hold week prior), Lixisenatide Adlyxin (hold the morning of procedure), Semaglutide Rybelsus (hold the morning of procedure), Tirzepatide Mounjaro (hold week prior)      DON'T: On the morning of the test do not take insulin or pills for diabetes.      DO: On the morning of the test, do take any pills for blood pressure, heart, anti-rejection and or seizures with a small sip of water. Bring any inhalers with you.     Leave all valuables and jewelry at home. You will be at the hospital for 2-4 hours.     Call the Endoscopy department at [phone] with any questions about your procedure.     Thank you for choosing [facility].

## 2025-01-07 NOTE — PROGRESS NOTES
"[facility]    CC: History of perforated gastric ulcer    HPI 64 y.o. male with a history of tobacco use and DM. He was referred to clinic following and admission for a perforated gastric ulcer s/p repair, suspected to be NSAID induced.  He continues to have intermittent episodes of epigastric abdominal pain. He is a current smoker and is attempting to wean his use.  Currently with normal bowel habits. No prior endoscopy or family history of malignancy.    Past Medical History  Past Medical History:   Diagnosis Date    Coronary artery disease     MI (myocardial infarction)    Diabetes mellitus     Neuropathy - Retinopathy - PAD    Glaucoma     High cholesterol     Hypertension        Physical Examination  /74 (BP Location: Left arm, Patient Position: Sitting)   Pulse (!) 115   Ht 6' 3" (1.905 m)   Wt 95.4 kg (210 lb 5.1 oz)   BMI 26.29 kg/m²   General appearance: alert, cooperative, no distress  Lungs: clear to auscultation bilaterally, no dullness to percussion bilaterally  Heart: regular rate and rhythm without rub; no displacement of the PMI   Abdomen: soft, non-tender; bowel sounds normoactive; no organomegaly    Assessment:   64 y.o. male with a history of tobacco use and DM. He was referred to clinic following and admission for a perforated gastric ulcer s/p repair, suspected to be NSAID induced. He continues to have intermittent episodes of epigastric pain. He has avoided NSAIDs but the healing of his gastric ulcer is complicated by his continued tobacco use.     Plan:  - Starting 6 week trial of misoprostol to ensure healing of gastric ulcer/repair  - EGD scheduled (3/13/25 patient requested date) after completion of therapy  - Discussed smoking cessation at length   - He is open to routine screening colonoscopy but wishes to delay scheduling until current symptoms improve.     Xander Boyd MD   [address] [place] [address], [address]  [place] [address] [phone]    "

## 2025-01-13 ENCOUNTER — TELEPHONE (OUTPATIENT)
Dept: GASTROENTEROLOGY | Facility: CLINIC | Age: 65
End: 2025-01-13
Payer: MEDICARE

## 2025-01-13 NOTE — TELEPHONE ENCOUNTER
----- Message from Vanessa sent at 1/13/2025  3:19 PM CST -----  Type:  Call    Who Called:wife  Does the patient know what this is regarding?:med  Would the patient rather a call back or a response via [facility]? call  Best Call Back Number:[phone]  Additional Information: Pt is still waiting for GI med that would heal him quicker.

## 2025-01-13 NOTE — TELEPHONE ENCOUNTER
Wife requesting status of medication recommended at visit on 1/7/25.  Request to be forward to Dr. Boyd.

## 2025-01-14 ENCOUNTER — TELEPHONE (OUTPATIENT)
Dept: GASTROENTEROLOGY | Facility: CLINIC | Age: 65
End: 2025-01-14
Payer: MEDICARE

## 2025-01-14 DIAGNOSIS — K25.9 GASTRIC ULCER, UNSPECIFIED CHRONICITY, UNSPECIFIED WHETHER GASTRIC ULCER HEMORRHAGE OR PERFORATION PRESENT: Primary | ICD-10-CM

## 2025-01-14 RX ORDER — MISOPROSTOL 200 UG/1
200 TABLET ORAL
Qty: 224 TABLET | Refills: 0 | Status: SHIPPED | OUTPATIENT
Start: 2025-01-14 | End: 2025-03-11

## 2025-01-14 NOTE — TELEPHONE ENCOUNTER
----- Message from Alea sent at 1/14/2025 11:09 AM CST -----  Type:  Pharmacy Calling to Clarify an RX    Name of Caller:  Pharmacy Name:Pharmacy   Prescription Name:miSOPROStoL (CYTOTEC) 200 MCG Tab  What do they need to clarify?:diagnosis code   Best Call Back Number:[phone]  Additional Information:

## 2025-01-14 NOTE — TELEPHONE ENCOUNTER
[facility] requesting dx code for misoprostol.  Dx code given and repeated correctly by pharmacy.

## 2025-03-06 ENCOUNTER — TELEPHONE (OUTPATIENT)
Dept: GASTROENTEROLOGY | Facility: CLINIC | Age: 65
End: 2025-03-06
Payer: MEDICARE

## 2025-03-06 NOTE — TELEPHONE ENCOUNTER
Messages placed on both patent and wife voicemail.  Arrival time @ 8am on Thursday, March 13, 2025 to [facility] 1st floor Admit.        Prep instructions reviewed: the day before the procedure.   Pt must be completely NPO  at midnight.               Medications: Do not take Insulin or oral diabetic medications the day of the procedure.  Take as prescribed: heart, seizure and blood pressure medication in the morning with a sip of water (less than an ounce).  Take any breathing medications and bring inhalers to hospital with you Leave all valuables and jewelry at home.      Wear comfortable clothes to procedure to change into hospital gown You cannot drive for 24 hours after your procedure because you will receive sedation for your procedure to make you comfortable.  A ride must be provided at discharge.      Pt denies taking glp-1 meds. Hold farxiga for 3 days and ozempic for 8 days prior to procedure.

## 2025-03-07 ENCOUNTER — TELEPHONE (OUTPATIENT)
Dept: GASTROENTEROLOGY | Facility: CLINIC | Age: 65
End: 2025-03-07
Payer: MEDICARE

## 2025-03-07 NOTE — TELEPHONE ENCOUNTER
Cassia, wife, given arrival time @ 8am on Thursday, March 13, 2025 to [facility] 1st floor Admit.        Prep instructions reviewed: the day before the procedure.   Pt must be completely NPO  at midnight.               Medications: Do not take Insulin or oral diabetic medications the day of the procedure.  Take as prescribed: heart, seizure and blood pressure medication in the morning with a sip of water (less than an ounce).  Take any breathing medications and bring inhalers to hospital with you Leave all valuables and jewelry at home.      Wear comfortable clothes to procedure to change into hospital gown You cannot drive for 24 hours after your procedure because you will receive sedation for your procedure to make you comfortable.  A ride must be provided at discharge.      Pt denies taking glp-1 meds. Hold farxiga for 3 days and ozempic for 8 days prior to procedure.

## 2025-03-12 NOTE — H&P
[facility]    CC: History of perforated gastric ulcer     HPI 64 y.o. male with a history of tobacco use and DM. He was referred to clinic following and admission for a perforated gastric ulcer s/p repair, suspected to be NSAID induced.  He continues to have intermittent episodes of epigastric abdominal pain. He is a current smoker and is attempting to wean his use.  Currently with normal bowel habits. No prior endoscopy or family history of malignancy.    Past Medical History  Past Medical History:   Diagnosis Date    Coronary artery disease     MI (myocardial infarction)    Diabetes mellitus     Neuropathy - Retinopathy - PAD    Glaucoma     High cholesterol     Hypertension          Physical Examination  There were no vitals taken for this visit.  General appearance: alert, cooperative, no distress  Lungs: clear to auscultation bilaterally, no dullness to percussion bilaterally  Heart: regular rate and rhythm without rub; no displacement of the PMI   Abdomen: soft, non-tender; bowel sounds normoactive; no organomegaly      Assessment:   64 y.o. male with a history of tobacco use and DM. He was referred to clinic following and admission for a perforated gastric ulcer s/p repair, suspected to be NSAID induced. He continues to have intermittent episodes of epigastric pain. He has avoided NSAIDs but the healing of his gastric ulcer is complicated by his continued tobacco use.     Plan:  - Evaluate if completed misoprostol therapy  - Proceed with EGD today     Xander Boyd MD   [address] [place] [address], [address]  [place] [address] [phone]

## 2025-03-13 ENCOUNTER — HOSPITAL ENCOUNTER (OUTPATIENT)
Facility: HOSPITAL | Age: 65
Discharge: HOME | End: 2025-03-13
Attending: INTERNAL MEDICINE | Admitting: INTERNAL MEDICINE
Payer: MEDICARE

## 2025-03-13 ENCOUNTER — ANESTHESIA (OUTPATIENT)
Dept: ENDOSCOPY | Facility: HOSPITAL | Age: 65
End: 2025-03-13
Payer: MEDICARE

## 2025-03-13 ENCOUNTER — ANESTHESIA EVENT (OUTPATIENT)
Dept: ENDOSCOPY | Facility: HOSPITAL | Age: 65
End: 2025-03-13
Payer: MEDICARE

## 2025-03-13 VITALS
HEART RATE: 62 BPM | HEIGHT: 75 IN | RESPIRATION RATE: 19 BRPM | WEIGHT: 234 LBS | TEMPERATURE: 98 F | SYSTOLIC BLOOD PRESSURE: 113 MMHG | OXYGEN SATURATION: 98 % | DIASTOLIC BLOOD PRESSURE: 68 MMHG | BODY MASS INDEX: 29.09 KG/M2

## 2025-03-13 DIAGNOSIS — K26.9 DUODENAL ULCER: Primary | ICD-10-CM

## 2025-03-13 DIAGNOSIS — K25.9 GASTRIC ULCER: ICD-10-CM

## 2025-03-13 LAB — POCT GLUCOSE: 191 MG/DL (ref 70–110)

## 2025-03-13 PROCEDURE — 27201012 HC FORCEPS, HOT/COLD, DISP: Performed by: INTERNAL MEDICINE

## 2025-03-13 PROCEDURE — 88305 TISSUE EXAM BY PATHOLOGIST: CPT | Mod: 26,,, | Performed by: PATHOLOGY

## 2025-03-13 PROCEDURE — 82962 GLUCOSE BLOOD TEST: CPT | Performed by: INTERNAL MEDICINE

## 2025-03-13 PROCEDURE — 25000003 PHARM REV CODE 250

## 2025-03-13 PROCEDURE — 25000003 PHARM REV CODE 250: Performed by: INTERNAL MEDICINE

## 2025-03-13 PROCEDURE — 63600175 PHARM REV CODE 636 W HCPCS

## 2025-03-13 PROCEDURE — 37000009 HC ANESTHESIA EA ADD 15 MINS: Performed by: INTERNAL MEDICINE

## 2025-03-13 PROCEDURE — 37000008 HC ANESTHESIA 1ST 15 MINUTES: Performed by: INTERNAL MEDICINE

## 2025-03-13 PROCEDURE — 88305 TISSUE EXAM BY PATHOLOGIST: CPT | Performed by: PATHOLOGY

## 2025-03-13 PROCEDURE — 43239 EGD BIOPSY SINGLE/MULTIPLE: CPT | Performed by: INTERNAL MEDICINE

## 2025-03-13 RX ORDER — DEXTROMETHORPHAN/PSEUDOEPHED 2.5-7.5/.8
DROPS ORAL
Status: COMPLETED | OUTPATIENT
Start: 2025-03-13 | End: 2025-03-13

## 2025-03-13 RX ORDER — LIDOCAINE HYDROCHLORIDE 20 MG/ML
INJECTION INTRAVENOUS
Status: DISCONTINUED | OUTPATIENT
Start: 2025-03-13 | End: 2025-03-13

## 2025-03-13 RX ORDER — SODIUM CHLORIDE 9 MG/ML
INJECTION, SOLUTION INTRAVENOUS CONTINUOUS
Status: DISCONTINUED | OUTPATIENT
Start: 2025-03-13 | End: 2025-03-13 | Stop reason: HOSPADM

## 2025-03-13 RX ORDER — PROPOFOL 10 MG/ML
VIAL (ML) INTRAVENOUS
Status: DISCONTINUED | OUTPATIENT
Start: 2025-03-13 | End: 2025-03-13

## 2025-03-13 RX ORDER — PHENYLEPHRINE HYDROCHLORIDE 10 MG/ML
INJECTION INTRAVENOUS
Status: DISCONTINUED | OUTPATIENT
Start: 2025-03-13 | End: 2025-03-13

## 2025-03-13 RX ORDER — SODIUM CHLORIDE 0.9 % (FLUSH) 0.9 %
10 SYRINGE (ML) INJECTION
Status: DISCONTINUED | OUTPATIENT
Start: 2025-03-13 | End: 2025-03-13 | Stop reason: HOSPADM

## 2025-03-13 RX ORDER — DEXMEDETOMIDINE HYDROCHLORIDE 100 UG/ML
INJECTION, SOLUTION INTRAVENOUS
Status: DISCONTINUED | OUTPATIENT
Start: 2025-03-13 | End: 2025-03-13

## 2025-03-13 RX ORDER — PROPOFOL 10 MG/ML
VIAL (ML) INTRAVENOUS CONTINUOUS PRN
Status: DISCONTINUED | OUTPATIENT
Start: 2025-03-13 | End: 2025-03-13

## 2025-03-13 RX ADMIN — SODIUM CHLORIDE: 0.9 INJECTION, SOLUTION INTRAVENOUS at 09:03

## 2025-03-13 RX ADMIN — PHENYLEPHRINE HYDROCHLORIDE 100 MCG: 10 INJECTION INTRAVENOUS at 09:03

## 2025-03-13 RX ADMIN — LIDOCAINE HYDROCHLORIDE 100 MG: 20 INJECTION, SOLUTION INTRAVENOUS at 09:03

## 2025-03-13 RX ADMIN — DEXMEDETOMIDINE HYDROCHLORIDE 8 MCG: 100 INJECTION, SOLUTION, CONCENTRATE INTRAVENOUS at 09:03

## 2025-03-13 RX ADMIN — PROPOFOL 20 MG: 10 INJECTION, EMULSION INTRAVENOUS at 09:03

## 2025-03-13 RX ADMIN — GLYCOPYRROLATE 0.2 MG: 0.2 INJECTION, SOLUTION INTRAMUSCULAR; INTRAVITREAL at 09:03

## 2025-03-13 RX ADMIN — PROPOFOL 150 MCG/KG/MIN: 10 INJECTION, EMULSION INTRAVENOUS at 09:03

## 2025-03-13 RX ADMIN — PROPOFOL 70 MG: 10 INJECTION, EMULSION INTRAVENOUS at 09:03

## 2025-03-13 NOTE — ANESTHESIA POSTPROCEDURE EVALUATION
Anesthesia Post Evaluation    Patient: Al Anna    Procedure(s) Performed: Procedure(s) (LRB):  EGD (ESOPHAGOGASTRODUODENOSCOPY) (N/A)    Final Anesthesia Type: general      Patient location during evaluation: PACU  Patient participation: Yes- Able to Participate  Level of consciousness: awake and alert  Post-procedure vital signs: reviewed and stable  Pain management: adequate  Airway patency: patent    PONV status at discharge: No PONV  Anesthetic complications: no      Cardiovascular status: stable  Respiratory status: room air  Hydration status: euvolemic  Follow-up not needed.              Vitals Value Taken Time   /68 03/13/25 10:20   Temp 36.8 °C (98.2 °F) 03/13/25 09:50   Pulse 62 03/13/25 10:20   Resp 19 03/13/25 10:20   SpO2 98 % 03/13/25 10:20         Event Time   Out of Recovery 10:22:17         Pain/Darby Score: Darby Score: 10 (3/13/2025 10:20 AM)

## 2025-03-13 NOTE — ANESTHESIA PREPROCEDURE EVALUATION
03/13/2025  Al Anna is a 64 y.o., male.      Pre-op Assessment    I have reviewed the Patient Summary Reports.     I have reviewed the Nursing Notes. I have reviewed the NPO Status.   I have reviewed the Medications.     Review of Systems  Anesthesia Hx:  No problems with previous Anesthesia               Denies Personal Hx of Anesthesia complications.                    Social:  Smoker       Cardiovascular:  Exercise tolerance: good   Hypertension   CAD          PVD hyperlipidemia                               Pulmonary:  Pulmonary Normal                       Renal/:  Chronic Renal Disease, CKD                Hepatic/GI:     GERD                Neurological:    Neuromuscular Disease,                                   Endocrine:  Diabetes               Physical Exam  General: Cooperative, Alert and Oriented    Airway:  Mallampati: III / II  Mouth Opening: Normal  TM Distance: Normal  Tongue: Large  Neck ROM: Normal ROM    Dental:  Dentures        Anesthesia Plan  Type of Anesthesia, risks & benefits discussed:    Anesthesia Type: Gen Natural Airway  Intra-op Monitoring Plan: Standard ASA Monitors  Post Op Pain Control Plan: multimodal analgesia  Induction:  IV  Informed Consent: Informed consent signed with the Patient and all parties understand the risks and agree with anesthesia plan.  All questions answered.   ASA Score: 3  Day of Surgery Review of History & Physical: H&P Update referred to the surgeon/provider.    Ready For Surgery From Anesthesia Perspective.     .

## 2025-03-13 NOTE — TRANSFER OF CARE
"Anesthesia Transfer of Care Note    Patient: Al Anna    Procedure(s) Performed: Procedure(s) (LRB):  EGD (ESOPHAGOGASTRODUODENOSCOPY) (N/A)    Patient location: GI    Anesthesia Type: general    Transport from OR: Transported from OR on room air with adequate spontaneous ventilation    Post pain: adequate analgesia    Post assessment: no apparent anesthetic complications and tolerated procedure well    Post vital signs: stable    Level of consciousness: sedated and responds to stimulation    Nausea/Vomiting: no nausea/vomiting    Complications: none    Transfer of care protocol was followed      Last vitals: Visit Vitals  BP 98/76 (BP Location: Left arm, Patient Position: Lying)   Pulse 70   Temp 36.6 °C (97.9 °F) (Oral)   Resp 18   Ht 6' 3" (1.905 m)   Wt 106.1 kg (234 lb)   SpO2 100%   BMI 29.25 kg/m²     "

## 2025-03-13 NOTE — [employer] PATIENT INSTRUCTIONS
Discharge Summary/Instructions after an Endoscopic Procedure  Patient Name: Al Anna  Patient MRN: [id number]  Patient YOB: 1960  Thursday, March 13, 2025  Xander Boyd MD  Dear patient,  As a result of recent federal legislation (The Federal Cures Act), you may   receive lab or pathology results from your procedure in your [facility]   account before your physician is able to contact you. Your physician or   their representative will relay the results to you with their   recommendations at their soonest availability.  Thank you,  Your health is very important to us during the Covid Crisis. Following your   procedure today, you will receive a daily text for 2 weeks asking about   signs or symptoms of Covid 19.  Please respond to this text when you   receive it so we can follow up and keep you as safe as possible.   RESTRICTIONS:  During your procedure today, you received medications for sedation.  These   medications may affect your judgment, balance and coordination.  Therefore,   for 24 hours, you have the following restrictions:   - DO NOT drive a car, operate machinery, make legal/financial decisions,   sign important papers or drink alcohol.    ACTIVITY:  Today: no heavy lifting, straining or running due to procedural   sedation/anesthesia.  The following day: return to full activity including work.  DIET:  Eat and drink normally unless instructed otherwise.     TREATMENT FOR COMMON SIDE EFFECTS:  - Mild abdominal pain, nausea, belching, bloating or excessive gas:  rest,   eat lightly and use a heating pad.  - Sore Throat: treat with throat lozenges and/or gargle with warm salt   water.  - Because air was used during the procedure, expelling large amounts of air   from your rectum or belching is normal.  - If a bowel prep was taken, you may not have a bowel movement for 1-3 days.    This is normal.  SYMPTOMS TO WATCH FOR AND REPORT TO YOUR PHYSICIAN:  1. Abdominal pain or bloating, other than  gas cramps.  2. Chest pain.  3. Back pain.  4. Signs of infection such as: chills or fever occurring within 24 hours   after the procedure.  5. Rectal bleeding, which would show as bright red, maroon, or black stools.   (A tablespoon of blood from the rectum is not serious, especially if   hemorrhoids are present.)  6. Vomiting.  7. Weakness or dizziness.  GO DIRECTLY TO THE NEAREST EMERGENCY ROOM IF YOU HAVE ANY OF THE FOLLOWING:      Difficulty breathing              Chills and/or fever over 101 F   Persistent vomiting and/or vomiting blood   Severe abdominal pain   Severe chest pain   Black, tarry stools   Bleeding- more than one tablespoon   Any other symptom or condition that you feel may need urgent attention  Your doctor recommends these additional instructions:  If any biopsies were taken, your doctors clinic will contact you in 1 to 2   weeks with any results.  - Discharge patient to home.   - Resume previous diet.   - Continue present medications.   - Avoid NSAIDs  - Tobacco cessation  - Await pathology results. Treat with Bismuth quad therapy if positive for H   pylori  For questions, problems or results please call your physician - Xander Boyd MD.  EMERGENCY PHONE NUMBER: [phone],  LAB RESULTS: [phone]  IF A COMPLICATION OR EMERGENCY SITUATION ARISES AND YOU ARE UNABLE TO REACH   YOUR PHYSICIAN - GO DIRECTLY TO THE EMERGENCY ROOM.  MD Xander Diop MD  3/13/2025 11:31:28 AM  This report has been verified and signed electronically.  Dear patient,  As a result of recent federal legislation (The Federal Cures Act), you may   receive lab or pathology results from your procedure in your [facility]   account before your physician is able to contact you. Your physician or   their representative will relay the results to you with their   recommendations at their soonest availability.  Thank you,  [employer]

## 2025-03-14 LAB
FINAL PATHOLOGIC DIAGNOSIS: NORMAL
GROSS: NORMAL
Lab: NORMAL

## 2025-03-17 ENCOUNTER — LAB VISIT (OUTPATIENT)
Dept: LAB | Facility: HOSPITAL | Age: 65
End: 2025-03-17
Attending: FAMILY MEDICINE
Payer: MEDICARE

## 2025-03-17 DIAGNOSIS — E11.65 TYPE 2 DIABETES MELLITUS WITH HYPERGLYCEMIA, WITHOUT LONG-TERM CURRENT USE OF INSULIN: ICD-10-CM

## 2025-03-17 PROBLEM — M86.672 OTHER CHRONIC OSTEOMYELITIS, LEFT ANKLE AND FOOT: Status: RESOLVED | Noted: 2024-03-05 | Resolved: 2025-03-17

## 2025-03-17 LAB
ALBUMIN/CREAT UR: 8 UG/MG (ref 0–30)
CREAT UR-MCNC: 88 MG/DL (ref 23–375)
MICROALBUMIN UR DL<=1MG/L-MCNC: 7 UG/ML

## 2025-03-17 PROCEDURE — 82570 ASSAY OF URINE CREATININE: CPT | Performed by: FAMILY MEDICINE

## 2025-03-20 ENCOUNTER — TELEPHONE (OUTPATIENT)
Dept: GASTROENTEROLOGY | Facility: CLINIC | Age: 65
End: 2025-03-20
Payer: MEDICARE

## 2025-03-20 NOTE — TELEPHONE ENCOUNTER
Audio appt scheduled with randy Antony, on Tuesday, April 1, 2025 at 3pm to review path results and treat for H pylori.

## 2025-03-20 NOTE — TELEPHONE ENCOUNTER
----- Message from Xander Boyd MD sent at 3/18/2025 10:11 AM CDT -----  Please schedule for a telemed visit in 14 days to review path results and treat for H pylori

## 2025-04-01 ENCOUNTER — OFFICE VISIT (OUTPATIENT)
Dept: GASTROENTEROLOGY | Facility: CLINIC | Age: 65
End: 2025-04-01
Payer: MEDICARE

## 2025-04-01 ENCOUNTER — TELEPHONE (OUTPATIENT)
Dept: GASTROENTEROLOGY | Facility: CLINIC | Age: 65
End: 2025-04-01
Payer: MEDICARE

## 2025-04-01 DIAGNOSIS — A04.8 H. PYLORI INFECTION: Primary | ICD-10-CM

## 2025-04-01 PROCEDURE — 3066F NEPHROPATHY DOC TX: CPT | Mod: CPTII,95,, | Performed by: INTERNAL MEDICINE

## 2025-04-01 PROCEDURE — 98005 SYNCH AUDIO-VIDEO EST LOW 20: CPT | Mod: 95,,, | Performed by: INTERNAL MEDICINE

## 2025-04-01 PROCEDURE — 3046F HEMOGLOBIN A1C LEVEL >9.0%: CPT | Mod: CPTII,95,, | Performed by: INTERNAL MEDICINE

## 2025-04-01 PROCEDURE — 4010F ACE/ARB THERAPY RXD/TAKEN: CPT | Mod: CPTII,95,, | Performed by: INTERNAL MEDICINE

## 2025-04-01 PROCEDURE — 3061F NEG MICROALBUMINURIA REV: CPT | Mod: CPTII,95,, | Performed by: INTERNAL MEDICINE

## 2025-04-01 RX ORDER — BISMUTH SUBCITRATE POTASSIUM, METRONIDAZOLE AND TETRACYCLINE HYDROCHLORIDE 140; 125; 125 MG/1; MG/1; MG/1
3 CAPSULE ORAL
Qty: 120 CAPSULE | Refills: 0 | Status: SHIPPED | OUTPATIENT
Start: 2025-04-01 | End: 2025-04-11

## 2025-04-01 NOTE — TELEPHONE ENCOUNTER
----- Message from Alea sent at 4/1/2025  3:59 PM CDT -----  Type:  Patient Returning CallWho Called:Cassia / wife Would the patient rather a call back or a response via [facility]? Call back Best Call Back Number: [phone] Information: spoke with  Pharmacy... medication needs a prior authorization for approval .. bismuth-metronidazole-tetracycline (PLYERA) 140-125-125 mg per capsule ..   none

## 2025-04-01 NOTE — PROGRESS NOTES
Audio Visual Telehealth Visit     The patient location is: Home  The chief complaint leading to consultation is: Pathology follow up  Visit type: Virtual visit with [facility]   Total time spent in medical discussion with patient: 15 minutes  Total time spent on date of the encounter:20 minutes         Each patient to whom I provide medical services by telemedicine is:  (1) informed of the relationship between the physician and patient and the respective role of any other health care provider with respect to management of the patient; and (2) notified that they may decline to receive medical services by telemedicine and may withdraw from such care at any time.        HPI: 64 y.o. male with a history of tobacco use and DM. He was referred to clinic following and admission for a perforated gastric ulcer s/p repair, suspected to be NSAID induced. He continues to have intermittent episodes of epigastric abdominal pain. He is a current smoker and is attempting to wean his use. Currently with normal bowel habits. EGD from 3/13 with diffuse gastritis, positive for H pylori as well as a known duodenal bulb ulceration with signs of healing. He reports no symptoms currently, and states he feels well.      Assessment and plan:  64 y.o. male with a history of tobacco use and DM. He was referred to clinic following and admission for a perforated gastric ulcer s/p repair, suspected to be NSAID induced. EGD on 3/13 noted to be positive for H pylori.    Start Bismuth quad therapy  Colonoscopy being deferring by patient due to issues with ulceration          This service was not originating from a related E/M service provided within the previous 7 days nor will  to an E/M service or procedure within the next 24 hours or my soonest available appointment.

## 2025-04-03 ENCOUNTER — TELEPHONE (OUTPATIENT)
Dept: GASTROENTEROLOGY | Facility: CLINIC | Age: 65
End: 2025-04-03
Payer: MEDICARE

## 2025-04-03 NOTE — TELEPHONE ENCOUNTER
Kasia with [facility], unable to separate pylera, pa required.  [facility] to be contacted to complete pa.

## 2025-04-03 NOTE — TELEPHONE ENCOUNTER
----- Message from Fiordaliza sent at 4/3/2025  3:35 PM CDT -----  .Type:  Pharmacy Calling to Clarify an RXName of Caller:genaro from [facility]  REJI LAKHANI [[id number]]Pharmacy Name:[facility] Prescription Name:bismuth-metronidazole-tetracyclineWhat do they need to clarify?:medication can not be split up Best Call Back Number:[phone] Information:

## 2025-04-03 NOTE — TELEPHONE ENCOUNTER
----- Message from Mirela sent at 4/3/2025 12:26 PM CDT -----  Type:  Pharmacy Calling to Clarify an RXName of Caller: [facility] Name:[address] [facility] [address] [address]Xktgp: [phone] Fax: [phone] Name:bismuth-metronidazole-tetracycline (PLYERA) 140-125-125 mg per capsule What do they need to clarify?:not covered , need PA, unless sent separately Best Call Back Number:[phone] Information:

## 2025-04-04 ENCOUNTER — TELEPHONE (OUTPATIENT)
Dept: GASTROENTEROLOGY | Facility: CLINIC | Age: 65
End: 2025-04-04
Payer: MEDICARE

## 2025-04-07 ENCOUNTER — TELEPHONE (OUTPATIENT)
Dept: GASTROENTEROLOGY | Facility: CLINIC | Age: 65
End: 2025-04-07
Payer: MEDICARE

## 2025-04-07 NOTE — TELEPHONE ENCOUNTER
Pylera PA with [facility] at [phone]. PA completed via call.  Pending review, ref # [id number].

## 2025-04-09 DIAGNOSIS — A04.8 H. PYLORI INFECTION: Primary | ICD-10-CM

## 2025-04-09 RX ORDER — PANTOPRAZOLE SODIUM 40 MG/1
40 TABLET, DELAYED RELEASE ORAL 2 TIMES DAILY
Qty: 180 TABLET | Refills: 3 | Status: SHIPPED | OUTPATIENT
Start: 2025-04-09 | End: 2026-04-09

## 2025-04-09 RX ORDER — TETRACYCLINE HYDROCHLORIDE 500 MG/1
500 CAPSULE ORAL 4 TIMES DAILY
Qty: 56 CAPSULE | Refills: 0 | Status: SHIPPED | OUTPATIENT
Start: 2025-04-09 | End: 2025-04-23

## 2025-04-09 RX ORDER — METRONIDAZOLE 500 MG/1
500 TABLET ORAL 4 TIMES DAILY
Qty: 56 TABLET | Refills: 0 | Status: SHIPPED | OUTPATIENT
Start: 2025-04-09 | End: 2025-04-23

## 2025-06-19 ENCOUNTER — LAB VISIT (OUTPATIENT)
Dept: LAB | Facility: HOSPITAL | Age: 65
End: 2025-06-19
Attending: FAMILY MEDICINE
Payer: MEDICARE

## 2025-06-19 DIAGNOSIS — Z12.5 SCREENING FOR PROSTATE CANCER: ICD-10-CM

## 2025-06-19 DIAGNOSIS — I10 ESSENTIAL HYPERTENSION: ICD-10-CM

## 2025-06-19 DIAGNOSIS — E11.51 TYPE 2 DIABETES MELLITUS WITH PERIPHERAL ANGIOPATHY: ICD-10-CM

## 2025-06-19 LAB
ANION GAP (OHS): 9 MMOL/L (ref 8–16)
BUN SERPL-MCNC: 20 MG/DL (ref 8–23)
CALCIUM SERPL-MCNC: 9.6 MG/DL (ref 8.7–10.5)
CHLORIDE SERPL-SCNC: 109 MMOL/L (ref 95–110)
CO2 SERPL-SCNC: 22 MMOL/L (ref 23–29)
CREAT SERPL-MCNC: 1.2 MG/DL (ref 0.5–1.4)
EAG (OHS): 206 MG/DL (ref 68–131)
GFR SERPLBLD CREATININE-BSD FMLA CKD-EPI: >60 ML/MIN/1.73/M2
GLUCOSE SERPL-MCNC: 174 MG/DL (ref 70–110)
HBA1C MFR BLD: 8.8 % (ref 4–5.6)
POTASSIUM SERPL-SCNC: 5.1 MMOL/L (ref 3.5–5.1)
PSA SERPL-MCNC: 31.41 NG/ML
SODIUM SERPL-SCNC: 140 MMOL/L (ref 136–145)

## 2025-06-19 PROCEDURE — 84520 ASSAY OF UREA NITROGEN: CPT

## 2025-06-19 PROCEDURE — 83036 HEMOGLOBIN GLYCOSYLATED A1C: CPT

## 2025-06-19 PROCEDURE — 36415 COLL VENOUS BLD VENIPUNCTURE: CPT

## 2025-06-19 PROCEDURE — 84153 ASSAY OF PSA TOTAL: CPT

## 2025-06-25 ENCOUNTER — TELEPHONE (OUTPATIENT)
Dept: GASTROENTEROLOGY | Facility: CLINIC | Age: 65
End: 2025-06-25
Payer: MEDICARE

## 2025-06-25 PROBLEM — E87.5 HYPERKALEMIA: Status: RESOLVED | Noted: 2024-11-23 | Resolved: 2025-06-25

## 2025-06-25 PROBLEM — E66.01 MORBID OBESITY: Chronic | Status: RESOLVED | Noted: 2019-02-26 | Resolved: 2025-06-25

## 2025-06-25 PROBLEM — T81.49XA WOUND, SURGICAL, INFECTED: Status: RESOLVED | Noted: 2022-05-19 | Resolved: 2025-06-25

## 2025-06-25 PROBLEM — K66.8 FREE INTRAPERITONEAL AIR: Status: RESOLVED | Noted: 2024-11-22 | Resolved: 2025-06-25

## 2025-06-25 PROBLEM — E11.10 DIABETIC KETOACIDOSIS WITHOUT COMA ASSOCIATED WITH TYPE 2 DIABETES MELLITUS: Status: RESOLVED | Noted: 2024-11-22 | Resolved: 2025-06-25

## 2025-06-25 PROBLEM — R73.9 HYPERGLYCEMIA: Status: RESOLVED | Noted: 2024-11-23 | Resolved: 2025-06-25

## 2025-06-25 NOTE — TELEPHONE ENCOUNTER
Cassia, wife, pt continues to have abdominal discomfort.  Pt did not start quad therapy, due to cost of medication and continues to smoke.  Clinic appt scheduled on Wednesday, July 9, 2025 at 9am.  Advised to check in at 1st floor [facility] prior to coming to [address].

## 2025-08-14 ENCOUNTER — OFFICE VISIT (OUTPATIENT)
Dept: URGENT CARE | Facility: CLINIC | Age: 65
End: 2025-08-14
Payer: MEDICARE

## 2025-08-14 ENCOUNTER — HOSPITAL ENCOUNTER (INPATIENT)
Facility: HOSPITAL | Age: 65
LOS: 2 days | Discharge: HOME | DRG: 564 | End: 2025-08-16
Attending: EMERGENCY MEDICINE | Admitting: INTERNAL MEDICINE
Payer: MEDICARE

## 2025-08-14 VITALS
BODY MASS INDEX: 26.86 KG/M2 | TEMPERATURE: 99 F | RESPIRATION RATE: 18 BRPM | WEIGHT: 216 LBS | OXYGEN SATURATION: 98 % | HEART RATE: 116 BPM | HEIGHT: 75 IN

## 2025-08-14 DIAGNOSIS — E11.40 TYPE 2 DIABETES MELLITUS WITH DIABETIC NEUROPATHY, WITH LONG-TERM CURRENT USE OF INSULIN: Chronic | ICD-10-CM

## 2025-08-14 DIAGNOSIS — A41.9 SEPSIS WITH ACUTE RENAL FAILURE WITHOUT SEPTIC SHOCK, DUE TO UNSPECIFIED ORGANISM, UNSPECIFIED ACUTE RENAL FAILURE TYPE: ICD-10-CM

## 2025-08-14 DIAGNOSIS — A41.9 SEPSIS, DUE TO UNSPECIFIED ORGANISM, UNSPECIFIED WHETHER ACUTE ORGAN DYSFUNCTION PRESENT: Primary | ICD-10-CM

## 2025-08-14 DIAGNOSIS — S91.302A: ICD-10-CM

## 2025-08-14 DIAGNOSIS — L97.529 ULCER OF LEFT FOOT, UNSPECIFIED ULCER STAGE: ICD-10-CM

## 2025-08-14 DIAGNOSIS — N17.9 SEPSIS WITH ACUTE RENAL FAILURE WITHOUT SEPTIC SHOCK, DUE TO UNSPECIFIED ORGANISM, UNSPECIFIED ACUTE RENAL FAILURE TYPE: ICD-10-CM

## 2025-08-14 DIAGNOSIS — A41.9 SEVERE SEPSIS: ICD-10-CM

## 2025-08-14 DIAGNOSIS — E78.00 HIGH CHOLESTEROL: ICD-10-CM

## 2025-08-14 DIAGNOSIS — L97.526 ULCER OF LEFT FOOT WITH BONE INVOLVEMENT WITHOUT EVIDENCE OF NECROSIS: ICD-10-CM

## 2025-08-14 DIAGNOSIS — R10.9 ABDOMINAL PAIN, UNSPECIFIED ABDOMINAL LOCATION: ICD-10-CM

## 2025-08-14 DIAGNOSIS — R11.2 NAUSEA AND VOMITING, UNSPECIFIED VOMITING TYPE: ICD-10-CM

## 2025-08-14 DIAGNOSIS — L97.509 DIABETIC FOOT ULCER ASSOCIATED WITH TYPE 2 DIABETES MELLITUS: ICD-10-CM

## 2025-08-14 DIAGNOSIS — Z79.4 TYPE 2 DIABETES MELLITUS WITH DIABETIC NEUROPATHY, WITH LONG-TERM CURRENT USE OF INSULIN: Chronic | ICD-10-CM

## 2025-08-14 DIAGNOSIS — R65.20 SEVERE SEPSIS: ICD-10-CM

## 2025-08-14 DIAGNOSIS — R65.20 SEPSIS WITH ACUTE RENAL FAILURE WITHOUT SEPTIC SHOCK, DUE TO UNSPECIFIED ORGANISM, UNSPECIFIED ACUTE RENAL FAILURE TYPE: ICD-10-CM

## 2025-08-14 DIAGNOSIS — R74.01 TRANSAMINITIS: ICD-10-CM

## 2025-08-14 DIAGNOSIS — Z13.6 SCREENING FOR CARDIOVASCULAR CONDITION: ICD-10-CM

## 2025-08-14 DIAGNOSIS — R68.83 CHILLS: Primary | ICD-10-CM

## 2025-08-14 DIAGNOSIS — N17.9 AKI (ACUTE KIDNEY INJURY): ICD-10-CM

## 2025-08-14 DIAGNOSIS — E61.1 IRON DEFICIENCY: ICD-10-CM

## 2025-08-14 DIAGNOSIS — Z89.432 HISTORY OF AMPUTATION OF LEFT FOOT: Chronic | ICD-10-CM

## 2025-08-14 DIAGNOSIS — E11.621 DIABETIC FOOT ULCER ASSOCIATED WITH TYPE 2 DIABETES MELLITUS: ICD-10-CM

## 2025-08-14 DIAGNOSIS — D70.3 NEUTROPENIA ASSOCIATED WITH INFECTION: ICD-10-CM

## 2025-08-14 DIAGNOSIS — D69.6 THROMBOCYTOPENIA: ICD-10-CM

## 2025-08-14 DIAGNOSIS — I73.9 PVD (PERIPHERAL VASCULAR DISEASE): ICD-10-CM

## 2025-08-14 PROBLEM — F17.200 TOBACCO DEPENDENCY: Status: ACTIVE | Noted: 2025-08-14

## 2025-08-14 PROBLEM — D64.9 ANEMIA: Status: ACTIVE | Noted: 2025-08-14

## 2025-08-14 PROBLEM — Z87.11 HISTORY OF GASTRIC ULCER: Status: ACTIVE | Noted: 2025-08-14

## 2025-08-14 PROBLEM — E87.20 LACTIC ACIDOSIS: Status: ACTIVE | Noted: 2025-08-14

## 2025-08-14 PROBLEM — D70.9 NEUTROPENIA: Status: ACTIVE | Noted: 2025-08-14

## 2025-08-14 LAB
ABSOLUTE NEUTROPHIL MANUAL (OHS): 1.3 K/UL (ref 1.8–7.7)
ALBUMIN SERPL BCP-MCNC: 3.4 G/DL (ref 3.5–5.2)
ALP SERPL-CCNC: 124 UNIT/L (ref 40–150)
ALT SERPL W/O P-5'-P-CCNC: 61 UNIT/L (ref 10–44)
ANION GAP (OHS): 13 MMOL/L (ref 8–16)
AST SERPL-CCNC: 85 UNIT/L (ref 11–45)
B-OH-BUTYR BLD STRIP-SCNC: 0.3 MMOL/L
BACTERIA #/AREA URNS AUTO: NORMAL /HPF
BASOPHILS NFR BLD MANUAL: 2 %
BILIRUB SERPL-MCNC: 0.9 MG/DL (ref 0.1–1)
BILIRUB UR QL STRIP.AUTO: NEGATIVE
BUN SERPL-MCNC: 19 MG/DL (ref 8–23)
CALCIUM SERPL-MCNC: 9.1 MG/DL (ref 8.7–10.5)
CHLORIDE SERPL-SCNC: 107 MMOL/L (ref 95–110)
CK SERPL-CCNC: 206 U/L (ref 20–200)
CLARITY UR: ABNORMAL
CO2 SERPL-SCNC: 21 MMOL/L (ref 23–29)
COLOR UR AUTO: YELLOW
CREAT SERPL-MCNC: 1.5 MG/DL (ref 0.5–1.4)
CRP SERPL-MCNC: 163.1 MG/L
EAG (OHS): 212 MG/DL (ref 68–131)
ERYTHROCYTE [DISTWIDTH] IN BLOOD BY AUTOMATED COUNT: 15.6 % (ref 11.5–14.5)
ERYTHROCYTE [SEDIMENTATION RATE] IN BLOOD BY PHOTOMETRIC METHOD: 42 MM/HR
FERRITIN SERPL-MCNC: 218.2 NG/ML (ref 20–300)
FLUAV AG UPPER RESP QL IA.RAPID: NEGATIVE
FLUBV AG UPPER RESP QL IA.RAPID: NEGATIVE
GFR SERPLBLD CREATININE-BSD FMLA CKD-EPI: 52 ML/MIN/1.73/M2
GLUCOSE SERPL-MCNC: 150 MG/DL (ref 70–110)
GLUCOSE UR QL STRIP: ABNORMAL
HBA1C MFR BLD: 9 % (ref 4–5.6)
HCT VFR BLD AUTO: 42.6 % (ref 40–54)
HGB BLD-MCNC: 13.9 GM/DL (ref 14–18)
HGB UR QL STRIP: ABNORMAL
HOLD SPECIMEN: NORMAL
HYALINE CASTS UR QL AUTO: 0 /LPF (ref 0–1)
KETONES UR QL STRIP: NEGATIVE
LACTATE SERPL-SCNC: 2.1 MMOL/L (ref 0.5–2.2)
LDH SERPL L TO P-CCNC: 4.2 MMOL/L (ref 0.5–2.2)
LEUKOCYTE ESTERASE UR QL STRIP: ABNORMAL
LYMPHOCYTES NFR BLD MANUAL: 20 % (ref 18–48)
MAGNESIUM SERPL-MCNC: 1.8 MG/DL (ref 1.6–2.6)
MCH RBC QN AUTO: 27.8 PG (ref 27–31)
MCHC RBC AUTO-ENTMCNC: 32.6 G/DL (ref 32–36)
MCV RBC AUTO: 85 FL (ref 82–98)
MICROSCOPIC COMMENT: NORMAL
NEUTROPHILS NFR BLD MANUAL: 72 % (ref 38–73)
NEUTS BAND NFR BLD MANUAL: 6 %
NITRITE UR QL STRIP: NEGATIVE
NUCLEATED RBC (/100WBC) (OHS): 0 /100 WBC
PCO2 BLDA: 34.3 MMHG (ref 35–45)
PH SMN: 7.42 [PH] (ref 7.35–7.45)
PH UR STRIP: 6 [PH]
PLATELET # BLD AUTO: 117 K/UL (ref 150–450)
PLATELET BLD QL SMEAR: ABNORMAL
PMV BLD AUTO: 10.7 FL (ref 9.2–12.9)
PO2 BLDA: 30.1 MMHG (ref 40–60)
POC BASE DEFICIT: -1.4 MMOL/L (ref -2–2)
POC HCO3: 22.4 MMOL/L (ref 24–28)
POC PERFORMED BY: ABNORMAL
POC SATURATED O2: 59.7 % (ref 95–100)
POCT GLUCOSE: 183 MG/DL (ref 70–110)
POCT GLUCOSE: 241 MG/DL (ref 70–110)
POTASSIUM SERPL-SCNC: 3.5 MMOL/L (ref 3.5–5.1)
PROCALCITONIN SERPL-MCNC: 60.69 NG/ML
PROT SERPL-MCNC: 7.6 GM/DL (ref 6–8.4)
PROT UR QL STRIP: ABNORMAL
RBC # BLD AUTO: 5 M/UL (ref 4.6–6.2)
RBC #/AREA URNS AUTO: 3 /HPF (ref 0–4)
SARS-COV-2 RDRP RESP QL NAA+PROBE: NEGATIVE
SODIUM SERPL-SCNC: 141 MMOL/L (ref 136–145)
SP GR UR STRIP: 1.01
SPECIMEN SOURCE: ABNORMAL
SQUAMOUS #/AREA URNS AUTO: 1 /HPF
UROBILINOGEN UR STRIP-ACNC: NEGATIVE EU/DL
WBC # BLD AUTO: 1.61 K/UL (ref 3.9–12.7)
WBC #/AREA URNS AUTO: 5 /HPF (ref 0–5)
YEAST UR QL AUTO: NORMAL /HPF

## 2025-08-14 PROCEDURE — 96365 THER/PROPH/DIAG IV INF INIT: CPT

## 2025-08-14 PROCEDURE — 87205 SMEAR GRAM STAIN: CPT

## 2025-08-14 PROCEDURE — 99285 EMERGENCY DEPT VISIT HI MDM: CPT | Mod: 25

## 2025-08-14 PROCEDURE — 87116 MYCOBACTERIA CULTURE: CPT

## 2025-08-14 PROCEDURE — 93005 ELECTROCARDIOGRAM TRACING: CPT

## 2025-08-14 PROCEDURE — 87154 CUL TYP ID BLD PTHGN 6+ TRGT: CPT

## 2025-08-14 PROCEDURE — 25000003 PHARM REV CODE 250

## 2025-08-14 PROCEDURE — 81003 URINALYSIS AUTO W/O SCOPE: CPT | Performed by: NURSE PRACTITIONER

## 2025-08-14 PROCEDURE — 99203 OFFICE O/P NEW LOW 30 MIN: CPT | Mod: S$GLB,,,

## 2025-08-14 PROCEDURE — 87502 INFLUENZA DNA AMP PROBE: CPT | Performed by: NURSE PRACTITIONER

## 2025-08-14 PROCEDURE — 36415 COLL VENOUS BLD VENIPUNCTURE: CPT

## 2025-08-14 PROCEDURE — 99233 SBSQ HOSP IP/OBS HIGH 50: CPT | Mod: ,,, | Performed by: STUDENT IN AN ORGANIZED HEALTH CARE EDUCATION/TRAINING PROGRAM

## 2025-08-14 PROCEDURE — 85652 RBC SED RATE AUTOMATED: CPT

## 2025-08-14 PROCEDURE — 83735 ASSAY OF MAGNESIUM: CPT

## 2025-08-14 PROCEDURE — 93010 ELECTROCARDIOGRAM REPORT: CPT | Mod: ,,, | Performed by: INTERNAL MEDICINE

## 2025-08-14 PROCEDURE — 84466 ASSAY OF TRANSFERRIN: CPT

## 2025-08-14 PROCEDURE — 11000001 HC ACUTE MED/SURG PRIVATE ROOM

## 2025-08-14 PROCEDURE — 87077 CULTURE AEROBIC IDENTIFY: CPT

## 2025-08-14 PROCEDURE — 96375 TX/PRO/DX INJ NEW DRUG ADDON: CPT

## 2025-08-14 PROCEDURE — U0002 COVID-19 LAB TEST NON-CDC: HCPCS | Performed by: NURSE PRACTITIONER

## 2025-08-14 PROCEDURE — 86140 C-REACTIVE PROTEIN: CPT

## 2025-08-14 PROCEDURE — 82962 GLUCOSE BLOOD TEST: CPT

## 2025-08-14 PROCEDURE — 87075 CULTR BACTERIA EXCEPT BLOOD: CPT

## 2025-08-14 PROCEDURE — 87070 CULTURE OTHR SPECIMN AEROBIC: CPT

## 2025-08-14 PROCEDURE — 63600175 PHARM REV CODE 636 W HCPCS: Performed by: INTERNAL MEDICINE

## 2025-08-14 PROCEDURE — 83036 HEMOGLOBIN GLYCOSYLATED A1C: CPT

## 2025-08-14 PROCEDURE — 63600175 PHARM REV CODE 636 W HCPCS

## 2025-08-14 PROCEDURE — 87206 SMEAR FLUORESCENT/ACID STAI: CPT

## 2025-08-14 PROCEDURE — 83605 ASSAY OF LACTIC ACID: CPT | Performed by: NURSE PRACTITIONER

## 2025-08-14 PROCEDURE — 82728 ASSAY OF FERRITIN: CPT

## 2025-08-14 PROCEDURE — 84145 PROCALCITONIN (PCT): CPT

## 2025-08-14 PROCEDURE — 85007 BL SMEAR W/DIFF WBC COUNT: CPT | Performed by: NURSE PRACTITIONER

## 2025-08-14 PROCEDURE — 84075 ASSAY ALKALINE PHOSPHATASE: CPT | Performed by: NURSE PRACTITIONER

## 2025-08-14 PROCEDURE — 25000003 PHARM REV CODE 250: Performed by: INTERNAL MEDICINE

## 2025-08-14 PROCEDURE — 25000003 PHARM REV CODE 250: Performed by: NURSE PRACTITIONER

## 2025-08-14 PROCEDURE — 27000207 HC ISOLATION

## 2025-08-14 PROCEDURE — 87102 FUNGUS ISOLATION CULTURE: CPT

## 2025-08-14 PROCEDURE — 82550 ASSAY OF CK (CPK): CPT

## 2025-08-14 PROCEDURE — 82010 KETONE BODYS QUAN: CPT

## 2025-08-14 PROCEDURE — 36415 COLL VENOUS BLD VENIPUNCTURE: CPT | Performed by: NURSE PRACTITIONER

## 2025-08-14 PROCEDURE — 80074 ACUTE HEPATITIS PANEL: CPT

## 2025-08-14 RX ORDER — ACETAMINOPHEN 325 MG/1
650 TABLET ORAL EVERY 6 HOURS PRN
Status: DISCONTINUED | OUTPATIENT
Start: 2025-08-14 | End: 2025-08-16 | Stop reason: HOSPADM

## 2025-08-14 RX ORDER — GLUCAGON 1 MG
1 KIT INJECTION
Status: DISCONTINUED | OUTPATIENT
Start: 2025-08-14 | End: 2025-08-16 | Stop reason: HOSPADM

## 2025-08-14 RX ORDER — MAGNESIUM SULFATE HEPTAHYDRATE 40 MG/ML
2 INJECTION, SOLUTION INTRAVENOUS ONCE
Status: COMPLETED | OUTPATIENT
Start: 2025-08-14 | End: 2025-08-14

## 2025-08-14 RX ORDER — IBUPROFEN 200 MG
16 TABLET ORAL
Status: DISCONTINUED | OUTPATIENT
Start: 2025-08-14 | End: 2025-08-16 | Stop reason: HOSPADM

## 2025-08-14 RX ORDER — PANTOPRAZOLE SODIUM 40 MG/1
40 TABLET, DELAYED RELEASE ORAL 2 TIMES DAILY
Status: DISCONTINUED | OUTPATIENT
Start: 2025-08-14 | End: 2025-08-16 | Stop reason: HOSPADM

## 2025-08-14 RX ORDER — ACETAMINOPHEN 325 MG/1
650 TABLET ORAL
Status: COMPLETED | OUTPATIENT
Start: 2025-08-14 | End: 2025-08-14

## 2025-08-14 RX ORDER — SODIUM CHLORIDE 0.9 % (FLUSH) 0.9 %
10 SYRINGE (ML) INJECTION EVERY 12 HOURS PRN
Status: DISCONTINUED | OUTPATIENT
Start: 2025-08-14 | End: 2025-08-16 | Stop reason: HOSPADM

## 2025-08-14 RX ORDER — ATORVASTATIN CALCIUM 40 MG/1
40 TABLET, FILM COATED ORAL DAILY
Status: DISCONTINUED | OUTPATIENT
Start: 2025-08-15 | End: 2025-08-16 | Stop reason: HOSPADM

## 2025-08-14 RX ORDER — NAPROXEN SODIUM 220 MG/1
81 TABLET, FILM COATED ORAL DAILY
Status: DISCONTINUED | OUTPATIENT
Start: 2025-08-14 | End: 2025-08-16

## 2025-08-14 RX ORDER — CEFEPIME HYDROCHLORIDE 1 G/1
1 INJECTION, POWDER, FOR SOLUTION INTRAMUSCULAR; INTRAVENOUS
Status: DISCONTINUED | OUTPATIENT
Start: 2025-08-14 | End: 2025-08-14

## 2025-08-14 RX ORDER — INSULIN ASPART 100 [IU]/ML
0-5 INJECTION, SOLUTION INTRAVENOUS; SUBCUTANEOUS
Status: DISCONTINUED | OUTPATIENT
Start: 2025-08-14 | End: 2025-08-16 | Stop reason: HOSPADM

## 2025-08-14 RX ORDER — IBUPROFEN 200 MG
24 TABLET ORAL
Status: DISCONTINUED | OUTPATIENT
Start: 2025-08-14 | End: 2025-08-16 | Stop reason: HOSPADM

## 2025-08-14 RX ADMIN — VANCOMYCIN HYDROCHLORIDE 2000 MG: 10 INJECTION, POWDER, LYOPHILIZED, FOR SOLUTION INTRAVENOUS at 03:08

## 2025-08-14 RX ADMIN — PIPERACILLIN AND TAZOBACTAM 4.5 G: 4; .5 INJECTION, POWDER, FOR SOLUTION INTRAVENOUS at 09:08

## 2025-08-14 RX ADMIN — ACETAMINOPHEN 650 MG: 325 TABLET ORAL at 02:08

## 2025-08-14 RX ADMIN — MAGNESIUM SULFATE HEPTAHYDRATE 2 G: 40 INJECTION, SOLUTION INTRAVENOUS at 09:08

## 2025-08-14 RX ADMIN — INSULIN ASPART 1 UNITS: 100 INJECTION, SOLUTION INTRAVENOUS; SUBCUTANEOUS at 09:08

## 2025-08-14 RX ADMIN — PIPERACILLIN AND TAZOBACTAM 4.5 G: 4; .5 INJECTION, POWDER, FOR SOLUTION INTRAVENOUS at 03:08

## 2025-08-14 RX ADMIN — PANTOPRAZOLE SODIUM 40 MG: 40 TABLET, DELAYED RELEASE ORAL at 09:08

## 2025-08-14 RX ADMIN — ASPIRIN 81 MG CHEWABLE TABLET 81 MG: 81 TABLET CHEWABLE at 09:08

## 2025-08-15 PROBLEM — E61.1 IRON DEFICIENCY: Status: ACTIVE | Noted: 2025-08-15

## 2025-08-15 PROBLEM — L97.529 ULCER OF LEFT FOOT: Status: ACTIVE | Noted: 2025-08-15

## 2025-08-15 PROBLEM — E53.8 FOLATE DEFICIENCY: Status: ACTIVE | Noted: 2025-08-15

## 2025-08-15 LAB
ABSOLUTE NEUTROPHIL MANUAL (OHS): 11.3 K/UL (ref 1.8–7.7)
ALBUMIN SERPL BCP-MCNC: 2.5 G/DL (ref 3.5–5.2)
ALP SERPL-CCNC: 91 UNIT/L (ref 40–150)
ALT SERPL W/O P-5'-P-CCNC: 71 UNIT/L (ref 10–44)
ANION GAP (OHS): 8 MMOL/L (ref 8–16)
AST SERPL-CCNC: 103 UNIT/L (ref 11–45)
BILIRUB SERPL-MCNC: 0.7 MG/DL (ref 0.1–1)
BUN SERPL-MCNC: 21 MG/DL (ref 8–23)
CALCIUM SERPL-MCNC: 8.2 MG/DL (ref 8.7–10.5)
CHLORIDE SERPL-SCNC: 107 MMOL/L (ref 95–110)
CO2 SERPL-SCNC: 24 MMOL/L (ref 23–29)
CREAT SERPL-MCNC: 1.5 MG/DL (ref 0.5–1.4)
ERYTHROCYTE [DISTWIDTH] IN BLOOD BY AUTOMATED COUNT: 15.6 % (ref 11.5–14.5)
FOLATE SERPL-MCNC: 3.6 NG/ML (ref 4–24)
GFR SERPLBLD CREATININE-BSD FMLA CKD-EPI: 52 ML/MIN/1.73/M2
GLUCOSE SERPL-MCNC: 213 MG/DL (ref 70–110)
GRAM STN SPEC: NORMAL
GRAM STN SPEC: NORMAL
HAV IGM SERPL QL IA: NORMAL
HBV CORE IGM SERPL QL IA: NORMAL
HBV SURFACE AG SERPL QL IA: NORMAL
HCT VFR BLD AUTO: 32.7 % (ref 40–54)
HCV AB SERPL QL IA: NORMAL
HGB BLD-MCNC: 10.9 GM/DL (ref 14–18)
IRON SATN MFR SERPL: 3 % (ref 20–50)
IRON SERPL-MCNC: 8 UG/DL (ref 45–160)
LYMPHOCYTES NFR BLD MANUAL: 19 % (ref 18–48)
MAGNESIUM SERPL-MCNC: 2.4 MG/DL (ref 1.6–2.6)
MCH RBC QN AUTO: 27.9 PG (ref 27–31)
MCHC RBC AUTO-ENTMCNC: 33.3 G/DL (ref 32–36)
MCV RBC AUTO: 84 FL (ref 82–98)
MONOCYTES NFR BLD MANUAL: 2 % (ref 4–15)
NEUTROPHILS NFR BLD MANUAL: 75 % (ref 38–73)
NEUTS BAND NFR BLD MANUAL: 4 %
NUCLEATED RBC (/100WBC) (OHS): 0 /100 WBC
PHOSPHATE SERPL-MCNC: 3.2 MG/DL (ref 2.7–4.5)
PLATELET # BLD AUTO: 101 K/UL (ref 150–450)
PLATELET BLD QL SMEAR: ABNORMAL
PMV BLD AUTO: 12.6 FL (ref 9.2–12.9)
POCT GLUCOSE: 145 MG/DL (ref 70–110)
POCT GLUCOSE: 172 MG/DL (ref 70–110)
POCT GLUCOSE: 178 MG/DL (ref 70–110)
POCT GLUCOSE: 215 MG/DL (ref 70–110)
POTASSIUM SERPL-SCNC: 3.7 MMOL/L (ref 3.5–5.1)
PROT SERPL-MCNC: 5.9 GM/DL (ref 6–8.4)
RBC # BLD AUTO: 3.9 M/UL (ref 4.6–6.2)
SODIUM SERPL-SCNC: 139 MMOL/L (ref 136–145)
TIBC SERPL-MCNC: 266 UG/DL (ref 250–450)
TRANSFERRIN SERPL-MCNC: 180 MG/DL (ref 200–375)
VIT B12 SERPL-MCNC: 474 PG/ML (ref 210–950)
WBC # BLD AUTO: 14.26 K/UL (ref 3.9–12.7)

## 2025-08-15 PROCEDURE — 85007 BL SMEAR W/DIFF WBC COUNT: CPT

## 2025-08-15 PROCEDURE — 82607 VITAMIN B-12: CPT

## 2025-08-15 PROCEDURE — 63600175 PHARM REV CODE 636 W HCPCS: Performed by: INTERNAL MEDICINE

## 2025-08-15 PROCEDURE — 97535 SELF CARE MNGMENT TRAINING: CPT

## 2025-08-15 PROCEDURE — 97161 PT EVAL LOW COMPLEX 20 MIN: CPT

## 2025-08-15 PROCEDURE — 80053 COMPREHEN METABOLIC PANEL: CPT

## 2025-08-15 PROCEDURE — 84100 ASSAY OF PHOSPHORUS: CPT

## 2025-08-15 PROCEDURE — 63600175 PHARM REV CODE 636 W HCPCS

## 2025-08-15 PROCEDURE — 25000003 PHARM REV CODE 250

## 2025-08-15 PROCEDURE — 97530 THERAPEUTIC ACTIVITIES: CPT

## 2025-08-15 PROCEDURE — 99233 SBSQ HOSP IP/OBS HIGH 50: CPT | Mod: ,,, | Performed by: STUDENT IN AN ORGANIZED HEALTH CARE EDUCATION/TRAINING PROGRAM

## 2025-08-15 PROCEDURE — 82746 ASSAY OF FOLIC ACID SERUM: CPT

## 2025-08-15 PROCEDURE — 97165 OT EVAL LOW COMPLEX 30 MIN: CPT

## 2025-08-15 PROCEDURE — 36415 COLL VENOUS BLD VENIPUNCTURE: CPT

## 2025-08-15 PROCEDURE — 83735 ASSAY OF MAGNESIUM: CPT

## 2025-08-15 PROCEDURE — 25000003 PHARM REV CODE 250: Performed by: INTERNAL MEDICINE

## 2025-08-15 PROCEDURE — 97116 GAIT TRAINING THERAPY: CPT

## 2025-08-15 PROCEDURE — 11000001 HC ACUTE MED/SURG PRIVATE ROOM

## 2025-08-15 RX ORDER — ENOXAPARIN SODIUM 100 MG/ML
40 INJECTION SUBCUTANEOUS EVERY 24 HOURS
Status: DISCONTINUED | OUTPATIENT
Start: 2025-08-15 | End: 2025-08-16 | Stop reason: HOSPADM

## 2025-08-15 RX ORDER — FERROUS SULFATE 325(65) MG
325 TABLET, DELAYED RELEASE (ENTERIC COATED) ORAL EVERY OTHER DAY
Qty: 45 TABLET | Refills: 3 | Status: SHIPPED | OUTPATIENT
Start: 2025-08-15 | End: 2025-08-16

## 2025-08-15 RX ORDER — INSULIN GLARGINE 100 [IU]/ML
5 INJECTION, SOLUTION SUBCUTANEOUS NIGHTLY
Status: DISCONTINUED | OUTPATIENT
Start: 2025-08-15 | End: 2025-08-16 | Stop reason: HOSPADM

## 2025-08-15 RX ORDER — FOLIC ACID 1 MG/1
1 TABLET ORAL DAILY
Status: DISCONTINUED | OUTPATIENT
Start: 2025-08-15 | End: 2025-08-16 | Stop reason: HOSPADM

## 2025-08-15 RX ORDER — FOLIC ACID 1 MG/1
1 TABLET ORAL DAILY
Qty: 90 TABLET | Refills: 3 | Status: SHIPPED | OUTPATIENT
Start: 2025-08-16 | End: 2025-08-16

## 2025-08-15 RX ORDER — LANOLIN ALCOHOL/MO/W.PET/CERES
1 CREAM (GRAM) TOPICAL EVERY OTHER DAY
Status: DISCONTINUED | OUTPATIENT
Start: 2025-08-16 | End: 2025-08-16 | Stop reason: HOSPADM

## 2025-08-15 RX ADMIN — ASPIRIN 81 MG CHEWABLE TABLET 81 MG: 81 TABLET CHEWABLE at 08:08

## 2025-08-15 RX ADMIN — PIPERACILLIN AND TAZOBACTAM 4.5 G: 4; .5 INJECTION, POWDER, FOR SOLUTION INTRAVENOUS at 06:08

## 2025-08-15 RX ADMIN — ATORVASTATIN CALCIUM 40 MG: 40 TABLET, FILM COATED ORAL at 08:08

## 2025-08-15 RX ADMIN — FOLIC ACID 1 MG: 1 TABLET ORAL at 02:08

## 2025-08-15 RX ADMIN — ENOXAPARIN SODIUM 40 MG: 40 INJECTION SUBCUTANEOUS at 05:08

## 2025-08-15 RX ADMIN — INSULIN ASPART 2 UNITS: 100 INJECTION, SOLUTION INTRAVENOUS; SUBCUTANEOUS at 06:08

## 2025-08-15 RX ADMIN — PIPERACILLIN AND TAZOBACTAM 4.5 G: 4; .5 INJECTION, POWDER, FOR SOLUTION INTRAVENOUS at 01:08

## 2025-08-15 RX ADMIN — PANTOPRAZOLE SODIUM 40 MG: 40 TABLET, DELAYED RELEASE ORAL at 08:08

## 2025-08-15 RX ADMIN — PIPERACILLIN AND TAZOBACTAM 4.5 G: 4; .5 INJECTION, POWDER, FOR SOLUTION INTRAVENOUS at 10:08

## 2025-08-15 RX ADMIN — INSULIN GLARGINE 5 UNITS: 100 INJECTION, SOLUTION SUBCUTANEOUS at 10:08

## 2025-08-15 RX ADMIN — PANTOPRAZOLE SODIUM 40 MG: 40 TABLET, DELAYED RELEASE ORAL at 10:08

## 2025-08-15 RX ADMIN — VANCOMYCIN HYDROCHLORIDE 2000 MG: 10 INJECTION, POWDER, LYOPHILIZED, FOR SOLUTION INTRAVENOUS at 05:08

## 2025-08-16 VITALS
SYSTOLIC BLOOD PRESSURE: 167 MMHG | OXYGEN SATURATION: 98 % | RESPIRATION RATE: 18 BRPM | WEIGHT: 216.06 LBS | HEIGHT: 75 IN | DIASTOLIC BLOOD PRESSURE: 80 MMHG | HEART RATE: 62 BPM | BODY MASS INDEX: 26.86 KG/M2 | TEMPERATURE: 98 F

## 2025-08-16 PROBLEM — N17.9 SEPSIS WITH ACUTE RENAL FAILURE WITHOUT SEPTIC SHOCK: Status: ACTIVE | Noted: 2025-08-16

## 2025-08-16 PROBLEM — A41.9 SEPSIS WITH ACUTE RENAL FAILURE WITHOUT SEPTIC SHOCK: Status: ACTIVE | Noted: 2025-08-16

## 2025-08-16 PROBLEM — A41.9 SEPSIS: Status: RESOLVED | Noted: 2025-08-14 | Resolved: 2025-08-16

## 2025-08-16 PROBLEM — R65.20 SEPSIS WITH ACUTE RENAL FAILURE WITHOUT SEPTIC SHOCK: Status: ACTIVE | Noted: 2025-08-16

## 2025-08-16 LAB
ABSOLUTE EOSINOPHIL (OHS): 0.33 K/UL
ABSOLUTE MONOCYTE (OHS): 1.29 K/UL (ref 0.3–1)
ABSOLUTE NEUTROPHIL COUNT (OHS): 7.71 K/UL (ref 1.8–7.7)
ACB COMPLEX DNA BLD POS QL NAA+NON-PROBE: NOT DETECTED
ALBUMIN SERPL BCP-MCNC: 2.4 G/DL (ref 3.5–5.2)
ALP SERPL-CCNC: 94 UNIT/L (ref 40–150)
ALT SERPL W/O P-5'-P-CCNC: 51 UNIT/L (ref 10–44)
ANION GAP (OHS): 7 MMOL/L (ref 8–16)
AST SERPL-CCNC: 51 UNIT/L (ref 11–45)
B FRAGILIS DNA BLD POS QL NAA+PROBE: NOT DETECTED
BASOPHILS # BLD AUTO: 0.09 K/UL
BASOPHILS NFR BLD AUTO: 0.8 %
BILIRUB SERPL-MCNC: 0.5 MG/DL (ref 0.1–1)
BUN SERPL-MCNC: 20 MG/DL (ref 8–23)
C ALBICANS DNA BLD POS QL NAA+PROBE: NOT DETECTED
C AURIS DNA BLD POS QL NAA+NON-PROBE: NOT DETECTED
C GATTII+NEOFOR DNA CSF QL NAA+NON-PROBE: NOT DETECTED
C GLABRATA DNA BLD POS QL NAA+PROBE: NOT DETECTED
C KRUSEI DNA BLD POS QL NAA+PROBE: NOT DETECTED
C PARAP DNA BLD POS QL NAA+PROBE: NOT DETECTED
C TROPICLS DNA BLD POS QL NAA+PROBE: NOT DETECTED
CALCIUM SERPL-MCNC: 8.4 MG/DL (ref 8.7–10.5)
CHLORIDE SERPL-SCNC: 109 MMOL/L (ref 95–110)
CO2 SERPL-SCNC: 25 MMOL/L (ref 23–29)
COLISTIN RES MCR-1 ISLT/SPM QL: NORMAL
CREAT SERPL-MCNC: 1.2 MG/DL (ref 0.5–1.4)
E CLOAC COMP DNA BLD POS QL NAA+PROBE: NOT DETECTED
E COLI DNA BLD POS QL NAA+PROBE: NOT DETECTED
E FAECALIS+OTHR E SP RRNA BLD POS FISH: NOT DETECTED
E FAECIUM HSP60 BLD POS QL PROBE: NOT DETECTED
ENTEROBACTERALES DNA BLD POS NAA+N-PRB: NOT DETECTED
ERYTHROCYTE [DISTWIDTH] IN BLOOD BY AUTOMATED COUNT: 15.6 % (ref 11.5–14.5)
ESBL CFT TO CFT-CLAV IC RTO BD POS IMP: NORMAL
GFR SERPLBLD CREATININE-BSD FMLA CKD-EPI: >60 ML/MIN/1.73/M2
GLUCOSE SERPL-MCNC: 122 MG/DL (ref 70–110)
GP B STREP DNA CSF QL NAA+NON-PROBE: NOT DETECTED
HAEM INFLU DNA CSF QL NAA+NON-PROBE: NOT DETECTED
HCT VFR BLD AUTO: 35.9 % (ref 40–54)
HGB BLD-MCNC: 11.5 GM/DL (ref 14–18)
IMM GRANULOCYTES # BLD AUTO: 0.14 K/UL (ref 0–0.04)
IMM GRANULOCYTES NFR BLD AUTO: 1.2 % (ref 0–0.5)
IMP CARBAPENEMASE ISLT QL IA.RAPID: NORMAL
K OXYTOCA OMPA BLD POS QL PROBE: NOT DETECTED
KLEBSIELLA SP DNA BLD POS QL NAA+NON-PRB: NOT DETECTED
KLEBSIELLA SP DNA BLD POS QL NAA+NON-PRB: NOT DETECTED
KPC CARBAPENEMASE ISLT QL IA.RAPID: NORMAL
L MONOCYTOG DNA CSF QL NAA+NON-PROBE: NOT DETECTED
LYMPHOCYTES # BLD AUTO: 1.78 K/UL (ref 1–4.8)
MAGNESIUM SERPL-MCNC: 2.3 MG/DL (ref 1.6–2.6)
MCH RBC QN AUTO: 27.5 PG (ref 27–31)
MCHC RBC AUTO-ENTMCNC: 32 G/DL (ref 32–36)
MCV RBC AUTO: 86 FL (ref 82–98)
MECA+MECC NOSE QL NAA+PROBE: NORMAL
MECA+MECC+MREJ ISLT/SPM QL: NORMAL
N MEN DNA CSF QL NAA+NON-PROBE: NOT DETECTED
NDM CARBAPENEMASE ISLT QL IA.RAPID: NORMAL
NUCLEATED RBC (/100WBC) (OHS): 0 /100 WBC
OXA-48-LIKE CRBPNASE ISLT QL IA.RAPID: NORMAL
P AERUGINOSA DNA BLD POS QL NAA+PROBE: NOT DETECTED
PHOSPHATE SERPL-MCNC: 2.4 MG/DL (ref 2.7–4.5)
PLATELET # BLD AUTO: 111 K/UL (ref 150–450)
PMV BLD AUTO: 12 FL (ref 9.2–12.9)
POCT GLUCOSE: 132 MG/DL (ref 70–110)
POCT GLUCOSE: 145 MG/DL (ref 70–110)
POTASSIUM SERPL-SCNC: 3.5 MMOL/L (ref 3.5–5.1)
PROT SERPL-MCNC: 5.7 GM/DL (ref 6–8.4)
PROTEUS SP DNA BLD POS QL NAA+PROBE: NOT DETECTED
RBC # BLD AUTO: 4.18 M/UL (ref 4.6–6.2)
RELATIVE EOSINOPHIL (OHS): 2.9 %
RELATIVE LYMPHOCYTE (OHS): 15.7 % (ref 18–48)
RELATIVE MONOCYTE (OHS): 11.4 % (ref 4–15)
RELATIVE NEUTROPHIL (OHS): 68 % (ref 38–73)
S AUREUS DNA BLD POS QL NAA+PROBE: NOT DETECTED
S ENT+BONG DNA STL QL NAA+NON-PROBE: NOT DETECTED
S EPIDERMIDIS HSP60 BLD POS QL PROBE: NOT DETECTED
S LUGDUNENSIS SODA BLD POS QL PROBE: NOT DETECTED
S MALTOPH DNA BLD POS QL NAA+PROBE: NOT DETECTED
S MARCESCENS DNA BLD POS QL NAA+PROBE: NOT DETECTED
S PNEUM DNA CSF QL NAA+NON-PROBE: NOT DETECTED
S PYOGENES HSP60 BLD POS QL PROBE: NOT DETECTED
SODIUM SERPL-SCNC: 141 MMOL/L (ref 136–145)
STAPH SP TUF BLD POS QL PROBE: NOT DETECTED
STREPTOCOCCUS SP TUF BLD POS QL PROBE: NOT DETECTED
VAN(A+B+C1+C2) GENES ISLT/SPM: NORMAL
VANCOMYCIN TROUGH SERPL-MCNC: 11.5 UG/ML (ref 10–22)
VIM CARBAPENEMASE ISLT QL IA.RAPID: NORMAL
WBC # BLD AUTO: 11.34 K/UL (ref 3.9–12.7)

## 2025-08-16 PROCEDURE — 97116 GAIT TRAINING THERAPY: CPT

## 2025-08-16 PROCEDURE — 25000003 PHARM REV CODE 250

## 2025-08-16 PROCEDURE — 63600175 PHARM REV CODE 636 W HCPCS: Performed by: INTERNAL MEDICINE

## 2025-08-16 PROCEDURE — 85025 COMPLETE CBC W/AUTO DIFF WBC: CPT

## 2025-08-16 PROCEDURE — 83735 ASSAY OF MAGNESIUM: CPT

## 2025-08-16 PROCEDURE — 36415 COLL VENOUS BLD VENIPUNCTURE: CPT

## 2025-08-16 PROCEDURE — 25000003 PHARM REV CODE 250: Performed by: INTERNAL MEDICINE

## 2025-08-16 PROCEDURE — 84100 ASSAY OF PHOSPHORUS: CPT

## 2025-08-16 PROCEDURE — 80202 ASSAY OF VANCOMYCIN: CPT

## 2025-08-16 PROCEDURE — 82435 ASSAY OF BLOOD CHLORIDE: CPT

## 2025-08-16 RX ORDER — FOLIC ACID 1 MG/1
1 TABLET ORAL DAILY
Qty: 90 TABLET | Refills: 3 | Status: SHIPPED | OUTPATIENT
Start: 2025-08-16 | End: 2026-08-16

## 2025-08-16 RX ORDER — DOXYCYCLINE HYCLATE 100 MG
100 TABLET ORAL EVERY 12 HOURS
Qty: 16 TABLET | Refills: 0 | Status: SHIPPED | OUTPATIENT
Start: 2025-08-16 | End: 2025-08-16

## 2025-08-16 RX ORDER — FERROUS SULFATE 325(65) MG
325 TABLET, DELAYED RELEASE (ENTERIC COATED) ORAL EVERY OTHER DAY
Qty: 45 TABLET | Refills: 3 | Status: SHIPPED | OUTPATIENT
Start: 2025-08-16

## 2025-08-16 RX ORDER — AMOXICILLIN AND CLAVULANATE POTASSIUM 875; 125 MG/1; MG/1
1 TABLET, FILM COATED ORAL EVERY 12 HOURS
Qty: 16 TABLET | Refills: 0 | Status: SHIPPED | OUTPATIENT
Start: 2025-08-16 | End: 2025-08-24

## 2025-08-16 RX ORDER — AMOXICILLIN AND CLAVULANATE POTASSIUM 875; 125 MG/1; MG/1
1 TABLET, FILM COATED ORAL EVERY 12 HOURS
Qty: 16 TABLET | Refills: 0 | Status: SHIPPED | OUTPATIENT
Start: 2025-08-16 | End: 2025-08-16

## 2025-08-16 RX ORDER — DOXYCYCLINE HYCLATE 100 MG
100 TABLET ORAL EVERY 12 HOURS
Status: DISCONTINUED | OUTPATIENT
Start: 2025-08-16 | End: 2025-08-16 | Stop reason: HOSPADM

## 2025-08-16 RX ORDER — DOXYCYCLINE HYCLATE 100 MG
100 TABLET ORAL EVERY 12 HOURS
Qty: 16 TABLET | Refills: 0 | Status: SHIPPED | OUTPATIENT
Start: 2025-08-16 | End: 2025-08-24

## 2025-08-16 RX ORDER — FOLIC ACID 1 MG/1
1 TABLET ORAL DAILY
Qty: 90 TABLET | Refills: 3 | Status: SHIPPED | OUTPATIENT
Start: 2025-08-16 | End: 2025-08-16

## 2025-08-16 RX ORDER — AMOXICILLIN AND CLAVULANATE POTASSIUM 875; 125 MG/1; MG/1
1 TABLET, FILM COATED ORAL EVERY 12 HOURS
Status: DISCONTINUED | OUTPATIENT
Start: 2025-08-16 | End: 2025-08-16 | Stop reason: HOSPADM

## 2025-08-16 RX ORDER — FERROUS SULFATE 325(65) MG
325 TABLET, DELAYED RELEASE (ENTERIC COATED) ORAL EVERY OTHER DAY
Qty: 45 TABLET | Refills: 3 | Status: SHIPPED | OUTPATIENT
Start: 2025-08-16 | End: 2025-08-16

## 2025-08-16 RX ADMIN — FERROUS SULFATE TAB 325 MG (65 MG ELEMENTAL FE) 1 EACH: 325 (65 FE) TAB at 09:08

## 2025-08-16 RX ADMIN — ATORVASTATIN CALCIUM 40 MG: 40 TABLET, FILM COATED ORAL at 09:08

## 2025-08-16 RX ADMIN — PANTOPRAZOLE SODIUM 40 MG: 40 TABLET, DELAYED RELEASE ORAL at 09:08

## 2025-08-16 RX ADMIN — DOXYCYCLINE HYCLATE 100 MG: 100 TABLET, COATED ORAL at 12:08

## 2025-08-16 RX ADMIN — FOLIC ACID 1 MG: 1 TABLET ORAL at 09:08

## 2025-08-16 RX ADMIN — AMOXICILLIN AND CLAVULANATE POTASSIUM 1 TABLET: 875; 125 TABLET, FILM COATED ORAL at 12:08

## 2025-08-16 RX ADMIN — PIPERACILLIN AND TAZOBACTAM 4.5 G: 4; .5 INJECTION, POWDER, FOR SOLUTION INTRAVENOUS at 06:08

## 2025-08-18 LAB
ACID FAST MOD KINY STN SPEC: NORMAL
BACTERIA BLD CULT: ABNORMAL
BACTERIA BLD CULT: ABNORMAL
BACTERIA SPEC AEROBE CULT: NORMAL
GRAM STN SPEC: ABNORMAL

## 2025-08-19 LAB
BACTERIA SPEC ANAEROBE CULT: ABNORMAL
FUNGUS SPEC CULT: NORMAL

## 2025-08-20 LAB
OHS QRS DURATION: 80 MS
OHS QTC CALCULATION: 448 MS

## 2025-08-21 ENCOUNTER — HOSPITAL ENCOUNTER (OUTPATIENT)
Dept: WOUND CARE | Facility: HOSPITAL | Age: 65
Discharge: HOME | End: 2025-08-21
Attending: SURGERY
Payer: MEDICARE

## 2025-08-21 ENCOUNTER — TELEPHONE (OUTPATIENT)
Dept: WOUND CARE | Facility: HOSPITAL | Age: 65
End: 2025-08-21
Payer: MEDICARE

## 2025-08-21 VITALS
HEIGHT: 75 IN | SYSTOLIC BLOOD PRESSURE: 154 MMHG | BODY MASS INDEX: 26.86 KG/M2 | DIASTOLIC BLOOD PRESSURE: 80 MMHG | TEMPERATURE: 98 F | HEART RATE: 74 BPM | WEIGHT: 216.06 LBS

## 2025-08-21 DIAGNOSIS — F11.20 UNCOMPLICATED OPIOID DEPENDENCE: ICD-10-CM

## 2025-08-21 DIAGNOSIS — N18.31 STAGE 3A CHRONIC KIDNEY DISEASE: Chronic | ICD-10-CM

## 2025-08-21 DIAGNOSIS — L97.526 ULCER OF LEFT FOOT WITH BONE INVOLVEMENT WITHOUT EVIDENCE OF NECROSIS: ICD-10-CM

## 2025-08-21 DIAGNOSIS — L97.522 DIABETIC ULCER OF LEFT FOOT ASSOCIATED WITH DIABETES MELLITUS DUE TO UNDERLYING CONDITION, WITH FAT LAYER EXPOSED, UNSPECIFIED PART OF FOOT: Primary | ICD-10-CM

## 2025-08-21 DIAGNOSIS — E08.621 DIABETIC ULCER OF LEFT FOOT ASSOCIATED WITH DIABETES MELLITUS DUE TO UNDERLYING CONDITION, WITH FAT LAYER EXPOSED, UNSPECIFIED PART OF FOOT: Primary | ICD-10-CM

## 2025-08-21 LAB
BACTERIA BLD CULT: ABNORMAL
BACTERIA BLD CULT: ABNORMAL
GRAM STN SPEC: ABNORMAL
GRAM STN SPEC: ABNORMAL

## 2025-08-21 PROCEDURE — 87075 CULTR BACTERIA EXCEPT BLOOD: CPT | Performed by: SURGERY

## 2025-08-21 PROCEDURE — 11042 DBRDMT SUBQ TIS 1ST 20SQCM/<: CPT

## 2025-08-21 PROCEDURE — 99203 OFFICE O/P NEW LOW 30 MIN: CPT

## 2025-08-21 PROCEDURE — 87186 SC STD MICRODIL/AGAR DIL: CPT | Performed by: SURGERY

## 2025-08-21 RX ORDER — TRAMADOL HYDROCHLORIDE 50 MG/1
50 TABLET, FILM COATED ORAL EVERY 6 HOURS
Qty: 30 TABLET | Refills: 0 | Status: SHIPPED | OUTPATIENT
Start: 2025-08-21 | End: 2025-08-29

## 2025-08-21 RX ORDER — COLLAGENASE SANTYL 250 [ARB'U]/G
OINTMENT TOPICAL DAILY
Qty: 90 G | Refills: 1 | Status: SHIPPED | OUTPATIENT
Start: 2025-08-21

## 2025-08-21 RX ORDER — GENTAMICIN SULFATE 1 MG/G
OINTMENT TOPICAL 3 TIMES DAILY
Qty: 60 G | Refills: 2 | Status: SHIPPED | OUTPATIENT
Start: 2025-08-21

## 2025-08-22 ENCOUNTER — TELEPHONE (OUTPATIENT)
Dept: WOUND CARE | Facility: HOSPITAL | Age: 65
End: 2025-08-22
Payer: MEDICARE

## 2025-08-23 LAB
BACTERIA SPEC AEROBE CULT: ABNORMAL
BACTERIA SPEC ANAEROBE CULT: NORMAL

## 2025-08-25 ENCOUNTER — PATIENT OUTREACH (OUTPATIENT)
Dept: ADMINISTRATIVE | Facility: OTHER | Age: 65
End: 2025-08-25
Payer: MEDICARE

## 2025-08-25 ENCOUNTER — TELEPHONE (OUTPATIENT)
Dept: ADMINISTRATIVE | Facility: CLINIC | Age: 65
End: 2025-08-25
Payer: MEDICARE

## 2025-08-27 ENCOUNTER — HOSPITAL ENCOUNTER (OUTPATIENT)
Dept: WOUND CARE | Facility: HOSPITAL | Age: 65
Discharge: HOME | End: 2025-08-27
Attending: SURGERY
Payer: MEDICARE

## 2025-08-27 VITALS — RESPIRATION RATE: 18 BRPM | HEART RATE: 85 BPM | SYSTOLIC BLOOD PRESSURE: 175 MMHG | DIASTOLIC BLOOD PRESSURE: 85 MMHG

## 2025-08-27 DIAGNOSIS — E08.621 DIABETIC ULCER OF LEFT FOOT ASSOCIATED WITH DIABETES MELLITUS DUE TO UNDERLYING CONDITION, WITH FAT LAYER EXPOSED, UNSPECIFIED PART OF FOOT: Primary | ICD-10-CM

## 2025-08-27 DIAGNOSIS — L97.522 DIABETIC ULCER OF LEFT FOOT ASSOCIATED WITH DIABETES MELLITUS DUE TO UNDERLYING CONDITION, WITH FAT LAYER EXPOSED, UNSPECIFIED PART OF FOOT: Primary | ICD-10-CM

## 2025-08-27 DIAGNOSIS — S91.302A: ICD-10-CM

## 2025-08-27 DIAGNOSIS — F11.20 UNCOMPLICATED OPIOID DEPENDENCE: ICD-10-CM

## 2025-08-27 DIAGNOSIS — Z72.0 TOBACCO USE: ICD-10-CM

## 2025-08-27 PROCEDURE — 11042 DBRDMT SUBQ TIS 1ST 20SQCM/<: CPT

## 2025-08-28 ENCOUNTER — TELEPHONE (OUTPATIENT)
Dept: WOUND CARE | Facility: HOSPITAL | Age: 65
End: 2025-08-28
Payer: MEDICARE

## 2025-08-29 LAB — MYCOBACTERIUM SPEC QL CULT: NORMAL

## (undated) DEVICE — SEE MEDLINE ITEM 157116

## (undated) DEVICE — SUT CTD VICRYL 3-0 CR/SH

## (undated) DEVICE — COVER OVERHEAD SURG LT BLUE

## (undated) DEVICE — GOWN POLY REINF BRTH SLV XL

## (undated) DEVICE — SUT 2-0 ETHILON 18 FS

## (undated) DEVICE — PACK SURGERY START

## (undated) DEVICE — DRESSING GAUZE XEROFORM 5X9

## (undated) DEVICE — MANIFOLD 4 PORT

## (undated) DEVICE — PAD ABDOMINAL 5X9 STERILE

## (undated) DEVICE — DRAPE THREE-QTR REINF 53X77IN

## (undated) DEVICE — GAUZE SPONGE 4X4 12PLY

## (undated) DEVICE — DRESSING XEROFORM FOIL PK 1X8

## (undated) DEVICE — SPONGE LAP 18X18 PREWASHED

## (undated) DEVICE — TRAY CATH 1-LYR URIMTR 16FR

## (undated) DEVICE — SYR 10CC LUER LOCK

## (undated) DEVICE — SUT PDS II 0 CT-1 VIL MONO

## (undated) DEVICE — DRAPE FLUID WARMER ORS 44X44IN

## (undated) DEVICE — SEE MEDLINE ITEM 154981

## (undated) DEVICE — SUT SILK 2-0 STRANDS 30IN

## (undated) DEVICE — SYR B-D DISP CONTROL 10CC100/C

## (undated) DEVICE — PACK BASIC

## (undated) DEVICE — TOWEL OR DISP STRL BLUE 4/PK

## (undated) DEVICE — PAD PREP CUFFED NS 24X48IN

## (undated) DEVICE — SUT 1 48IN PDS II VIO MONO

## (undated) DEVICE — NDL 22GA X1 1/2 REG BEVEL

## (undated) DEVICE — SEE MEDLINE ITEM 156955

## (undated) DEVICE — GOWN POLY REINF BRTH SLV LG

## (undated) DEVICE — SUT VICRYL 3-0 27 SH

## (undated) DEVICE — SUT 1 36IN PDS II VIO MONO

## (undated) DEVICE — DRESSING TRANS 4X4 3/4

## (undated) DEVICE — SPONGE DERMACEA GAUZE 4X4

## (undated) DEVICE — STAPLER SKIN ROTATING HEAD

## (undated) DEVICE — EVACUATOR WOUND BULB 100CC

## (undated) DEVICE — GLOVE BIOGEL ECLIPSE SZ 7.5

## (undated) DEVICE — CONTAINER SPECIMEN STR 3 0Z

## (undated) DEVICE — CONTAINER SPECIMEN STRL 3OZ

## (undated) DEVICE — DRESSING AQUACEL SACRAL 9 X 9

## (undated) DEVICE — SEE MEDLINE ITEM 157131

## (undated) DEVICE — PACK ECLIPSE UNIVERSAL STERILE

## (undated) DEVICE — BANDAGE MATRIX HK LOOP 4IN 5YD

## (undated) DEVICE — ELECTRODE REM PLYHSV RETURN 9

## (undated) DEVICE — APPLICATOR CHLORAPREP ORN 26ML

## (undated) DEVICE — SUT SILK 0 STRANDS 30IN BLK

## (undated) DEVICE — BLADE ELECTRO EXTENDED.

## (undated) DEVICE — PAD PREP 50/CA

## (undated) DEVICE — CONTAINERS 32OZ

## (undated) DEVICE — Device

## (undated) DEVICE — SEE MEDLINE ITEM 157117

## (undated) DEVICE — DRESSING XEROFORM NONADH 1X8IN

## (undated) DEVICE — SPONGE COTTON TRAY 4X4IN

## (undated) DEVICE — GLOVE SURGICAL LATEX SZ 8

## (undated) DEVICE — SUT 2/0 30IN SILK BLK BRAI

## (undated) DEVICE — SUPPORT ULNA NERVE PROTECTOR